# Patient Record
Sex: FEMALE | Race: WHITE | NOT HISPANIC OR LATINO | Employment: PART TIME | URBAN - METROPOLITAN AREA
[De-identification: names, ages, dates, MRNs, and addresses within clinical notes are randomized per-mention and may not be internally consistent; named-entity substitution may affect disease eponyms.]

---

## 2018-04-10 ENCOUNTER — HOSPITAL ENCOUNTER (INPATIENT)
Facility: MEDICAL CENTER | Age: 53
LOS: 38 days | DRG: 917 | End: 2018-05-18
Attending: EMERGENCY MEDICINE | Admitting: INTERNAL MEDICINE
Payer: COMMERCIAL

## 2018-04-10 ENCOUNTER — HOSPITAL ENCOUNTER (OUTPATIENT)
Dept: RADIOLOGY | Facility: MEDICAL CENTER | Age: 53
End: 2018-04-10

## 2018-04-10 ENCOUNTER — RESOLUTE PROFESSIONAL BILLING HOSPITAL PROF FEE (OUTPATIENT)
Dept: HOSPITALIST | Facility: MEDICAL CENTER | Age: 53
End: 2018-04-10
Payer: COMMERCIAL

## 2018-04-10 DIAGNOSIS — Q21.12 PFO (PATENT FORAMEN OVALE): ICD-10-CM

## 2018-04-10 DIAGNOSIS — I63.9 CEREBROVASCULAR ACCIDENT (CVA), UNSPECIFIED MECHANISM (HCC): ICD-10-CM

## 2018-04-10 DIAGNOSIS — R79.89 ELEVATED BRAIN NATRIURETIC PEPTIDE (BNP) LEVEL: ICD-10-CM

## 2018-04-10 DIAGNOSIS — F11.20 NARCOTIC DEPENDENCE (HCC): ICD-10-CM

## 2018-04-10 DIAGNOSIS — R79.89 ELEVATED TROPONIN: ICD-10-CM

## 2018-04-10 DIAGNOSIS — G40.909 SEIZURE DISORDER AS SEQUELA OF CEREBROVASCULAR ACCIDENT (HCC): ICD-10-CM

## 2018-04-10 DIAGNOSIS — D72.829 LEUKOCYTOSIS, UNSPECIFIED TYPE: ICD-10-CM

## 2018-04-10 DIAGNOSIS — I69.398 SEIZURE DISORDER AS SEQUELA OF CEREBROVASCULAR ACCIDENT (HCC): ICD-10-CM

## 2018-04-10 DIAGNOSIS — R41.82 ALTERED MENTAL STATUS, UNSPECIFIED ALTERED MENTAL STATUS TYPE: ICD-10-CM

## 2018-04-10 DIAGNOSIS — T40.604A NARCOTIC OVERDOSE, UNDETERMINED INTENT, INITIAL ENCOUNTER (HCC): ICD-10-CM

## 2018-04-10 PROBLEM — E87.20 LACTIC ACIDOSIS: Status: ACTIVE | Noted: 2018-04-10

## 2018-04-10 PROBLEM — N17.9 ACUTE KIDNEY INJURY (HCC): Status: ACTIVE | Noted: 2018-04-10

## 2018-04-10 PROBLEM — I95.9 HYPOTENSION: Status: ACTIVE | Noted: 2018-04-10

## 2018-04-10 PROBLEM — T50.901A ACCIDENTAL DRUG OVERDOSE: Status: ACTIVE | Noted: 2018-04-10

## 2018-04-10 PROBLEM — F99 PSYCHIATRIC DISORDER: Status: ACTIVE | Noted: 2018-04-10

## 2018-04-10 PROBLEM — R73.9 HYPERGLYCEMIA: Status: ACTIVE | Noted: 2018-04-10

## 2018-04-10 PROBLEM — E87.1 HYPONATREMIA: Status: ACTIVE | Noted: 2018-04-10

## 2018-04-10 LAB
CK SERPL-CCNC: 126 U/L (ref 0–154)
EST. AVERAGE GLUCOSE BLD GHB EST-MCNC: 108 MG/DL
HBA1C MFR BLD: 5.4 % (ref 0–5.6)
LACTATE BLD-SCNC: 1.6 MMOL/L (ref 0.5–2)
LV EJECT FRACT  99904: 70
LV EJECT FRACT MOD 2C 99903: 67.55
LV EJECT FRACT MOD 4C 99902: 78.09
LV EJECT FRACT MOD BP 99901: 73.64
TROPONIN I SERPL-MCNC: 0.25 NG/ML (ref 0–0.04)

## 2018-04-10 PROCEDURE — 700111 HCHG RX REV CODE 636 W/ 250 OVERRIDE (IP): Performed by: INTERNAL MEDICINE

## 2018-04-10 PROCEDURE — 99292 CRITICAL CARE ADDL 30 MIN: CPT | Performed by: HOSPITALIST

## 2018-04-10 PROCEDURE — 303105 HCHG CATHETER EXTRA

## 2018-04-10 PROCEDURE — 82550 ASSAY OF CK (CPK): CPT | Mod: 91

## 2018-04-10 PROCEDURE — 93306 TTE W/DOPPLER COMPLETE: CPT

## 2018-04-10 PROCEDURE — 99291 CRITICAL CARE FIRST HOUR: CPT

## 2018-04-10 PROCEDURE — 700102 HCHG RX REV CODE 250 W/ 637 OVERRIDE(OP): Performed by: HOSPITALIST

## 2018-04-10 PROCEDURE — 99291 CRITICAL CARE FIRST HOUR: CPT | Performed by: INTERNAL MEDICINE

## 2018-04-10 PROCEDURE — 93306 TTE W/DOPPLER COMPLETE: CPT | Mod: 26 | Performed by: INTERNAL MEDICINE

## 2018-04-10 PROCEDURE — 51702 INSERT TEMP BLADDER CATH: CPT

## 2018-04-10 PROCEDURE — 700105 HCHG RX REV CODE 258: Performed by: INTERNAL MEDICINE

## 2018-04-10 PROCEDURE — 94760 N-INVAS EAR/PLS OXIMETRY 1: CPT

## 2018-04-10 PROCEDURE — 95951 EEG: CPT | Mod: 52

## 2018-04-10 PROCEDURE — A9270 NON-COVERED ITEM OR SERVICE: HCPCS | Performed by: HOSPITALIST

## 2018-04-10 PROCEDURE — 700111 HCHG RX REV CODE 636 W/ 250 OVERRIDE (IP): Performed by: HOSPITALIST

## 2018-04-10 PROCEDURE — 83036 HEMOGLOBIN GLYCOSYLATED A1C: CPT

## 2018-04-10 PROCEDURE — 770022 HCHG ROOM/CARE - ICU (200)

## 2018-04-10 PROCEDURE — 700105 HCHG RX REV CODE 258: Performed by: HOSPITALIST

## 2018-04-10 PROCEDURE — 84484 ASSAY OF TROPONIN QUANT: CPT | Mod: 91

## 2018-04-10 PROCEDURE — 83605 ASSAY OF LACTIC ACID: CPT | Mod: 91

## 2018-04-10 PROCEDURE — 700111 HCHG RX REV CODE 636 W/ 250 OVERRIDE (IP)

## 2018-04-10 RX ORDER — HYDROCORTISONE 5 MG/1
5 TABLET ORAL DAILY
Status: DISCONTINUED | OUTPATIENT
Start: 2018-04-10 | End: 2018-04-22

## 2018-04-10 RX ORDER — ACETAMINOPHEN 325 MG/1
650 TABLET ORAL EVERY 6 HOURS PRN
Status: DISCONTINUED | OUTPATIENT
Start: 2018-04-10 | End: 2018-04-26

## 2018-04-10 RX ORDER — POLYETHYLENE GLYCOL 3350 17 G/17G
1 POWDER, FOR SOLUTION ORAL
Status: DISCONTINUED | OUTPATIENT
Start: 2018-04-10 | End: 2018-04-13

## 2018-04-10 RX ORDER — FLUDROCORTISONE ACETATE 0.1 MG/1
0.1 TABLET ORAL EVERY MORNING
Status: DISCONTINUED | OUTPATIENT
Start: 2018-04-10 | End: 2018-04-10

## 2018-04-10 RX ORDER — BISACODYL 10 MG
10 SUPPOSITORY, RECTAL RECTAL
Status: DISCONTINUED | OUTPATIENT
Start: 2018-04-10 | End: 2018-04-13

## 2018-04-10 RX ORDER — PANTOPRAZOLE SODIUM 20 MG/1
20 TABLET, DELAYED RELEASE ORAL
Status: DISCONTINUED | OUTPATIENT
Start: 2018-04-10 | End: 2018-04-10

## 2018-04-10 RX ORDER — NALOXONE HYDROCHLORIDE 1 MG/ML
INJECTION INTRAMUSCULAR; INTRAVENOUS; SUBCUTANEOUS
Status: DISPENSED
Start: 2018-04-10 | End: 2018-04-10

## 2018-04-10 RX ORDER — NALOXONE HYDROCHLORIDE 0.4 MG/ML
INJECTION, SOLUTION INTRAMUSCULAR; INTRAVENOUS; SUBCUTANEOUS
Status: COMPLETED
Start: 2018-04-10 | End: 2018-04-10

## 2018-04-10 RX ORDER — OMEPRAZOLE 20 MG/1
20 CAPSULE, DELAYED RELEASE ORAL DAILY
Status: DISCONTINUED | OUTPATIENT
Start: 2018-04-10 | End: 2018-04-11

## 2018-04-10 RX ORDER — AMOXICILLIN 250 MG
2 CAPSULE ORAL 2 TIMES DAILY
Status: DISCONTINUED | OUTPATIENT
Start: 2018-04-10 | End: 2018-04-13

## 2018-04-10 RX ORDER — HYDROCORTISONE 10 MG/1
10 TABLET ORAL DAILY
Status: DISCONTINUED | OUTPATIENT
Start: 2018-04-10 | End: 2018-04-10

## 2018-04-10 RX ORDER — LEVETIRACETAM 100 MG/ML
500 SOLUTION ORAL EVERY 12 HOURS
Status: DISCONTINUED | OUTPATIENT
Start: 2018-04-10 | End: 2018-04-14

## 2018-04-10 RX ORDER — HEPARIN SODIUM 5000 [USP'U]/ML
5000 INJECTION, SOLUTION INTRAVENOUS; SUBCUTANEOUS EVERY 8 HOURS
Status: DISCONTINUED | OUTPATIENT
Start: 2018-04-10 | End: 2018-04-26

## 2018-04-10 RX ORDER — LABETALOL HYDROCHLORIDE 5 MG/ML
5 INJECTION, SOLUTION INTRAVENOUS EVERY 4 HOURS PRN
Status: DISCONTINUED | OUTPATIENT
Start: 2018-04-10 | End: 2018-05-10

## 2018-04-10 RX ORDER — SODIUM CHLORIDE 9 MG/ML
INJECTION, SOLUTION INTRAVENOUS CONTINUOUS
Status: DISCONTINUED | OUTPATIENT
Start: 2018-04-10 | End: 2018-04-17

## 2018-04-10 RX ORDER — FLUDROCORTISONE ACETATE 0.1 MG/1
0.05 TABLET ORAL EVERY MORNING
Status: DISCONTINUED | OUTPATIENT
Start: 2018-04-10 | End: 2018-04-12

## 2018-04-10 RX ADMIN — AMPICILLIN SODIUM AND SULBACTAM SODIUM 3 G: 2; 1 INJECTION, POWDER, FOR SOLUTION INTRAMUSCULAR; INTRAVENOUS at 18:07

## 2018-04-10 RX ADMIN — LEVETIRACETAM 500 MG: 100 SOLUTION ORAL at 21:13

## 2018-04-10 RX ADMIN — HEPARIN SODIUM 5000 UNITS: 5000 INJECTION, SOLUTION INTRAVENOUS; SUBCUTANEOUS at 23:04

## 2018-04-10 RX ADMIN — HEPARIN SODIUM 5000 UNITS: 5000 INJECTION, SOLUTION INTRAVENOUS; SUBCUTANEOUS at 16:18

## 2018-04-10 RX ADMIN — SODIUM CHLORIDE: 900 INJECTION INTRAVENOUS at 23:04

## 2018-04-10 RX ADMIN — NALOXONE HYDROCHLORIDE: 0.4 INJECTION, SOLUTION INTRAMUSCULAR; INTRAVENOUS; SUBCUTANEOUS at 06:50

## 2018-04-10 RX ADMIN — SODIUM CHLORIDE: 900 INJECTION INTRAVENOUS at 09:18

## 2018-04-10 RX ADMIN — HEPARIN SODIUM 5000 UNITS: 5000 INJECTION, SOLUTION INTRAVENOUS; SUBCUTANEOUS at 09:18

## 2018-04-10 RX ADMIN — AMPICILLIN SODIUM AND SULBACTAM SODIUM 3 G: 2; 1 INJECTION, POWDER, FOR SOLUTION INTRAMUSCULAR; INTRAVENOUS at 11:46

## 2018-04-10 RX ADMIN — SODIUM CHLORIDE 1000 MG: 9 INJECTION, SOLUTION INTRAVENOUS at 16:18

## 2018-04-10 ASSESSMENT — ENCOUNTER SYMPTOMS
HEARTBURN: 0
CHILLS: 0
NECK PAIN: 0
HEADACHES: 0
DIZZINESS: 1
NERVOUS/ANXIOUS: 1
EYE PAIN: 0
PALPITATIONS: 0
CONSTIPATION: 0
TREMORS: 1
DIARRHEA: 0
WHEEZING: 0
FALLS: 0
FEVER: 0
SHORTNESS OF BREATH: 0
SPUTUM PRODUCTION: 0
SPEECH CHANGE: 0
ABDOMINAL PAIN: 0
TINGLING: 1
COUGH: 0
VOMITING: 0
BLURRED VISION: 0
WEIGHT LOSS: 0
WEAKNESS: 0
BACK PAIN: 0
PND: 0
NAUSEA: 0
LOSS OF CONSCIOUSNESS: 1
SEIZURES: 0
DEPRESSION: 0

## 2018-04-10 ASSESSMENT — PAIN SCALES - GENERAL
PAINLEVEL_OUTOF10: 0
PAINLEVEL_OUTOF10: 0

## 2018-04-10 ASSESSMENT — LIFESTYLE VARIABLES: SUBSTANCE_ABUSE: 0

## 2018-04-10 NOTE — PROGRESS NOTES
Pt admitted earlier this am by my partner for altered mental status, opiate overdose.  Transferred from outlying facility after being found down with multiple opiate  (fentanyl and buprenorphine).  Pt responded to narcan.  On my exam alert and agitated, is following some commands, purposeful.  Protecting her airway.

## 2018-04-10 NOTE — EEG PROGRESS NOTE
EEG 04/10/18 2:40 PM    ROUTINE ELECTROENCEPHALOGRAM REPORT      NAME: Shasta Dunn    REFERRING Dr:    INDICATION: altered mental status      TECHNIQUE: 30 channel routine electroencephalogram (EEG) was performed in accordance with the international 10-20 system. The study was reviewed in bipolar and referential montages. The recording examined the patient during wakeful and drowsy state(s).     DESCRIPTION OF THE RECORD:      Background rhythm during awake stage shows average voltage 8 to 9 hertz alpha activity in the posterior regions.  It blocks with eye opening and it is bilaterally synchronous and symmetrical.  No definite epileptiform spike-and-wave discharges but there are persistent 1-2 Hz delta lateralizing abnormalities over the left Stage II sleep was achieved.      ACTIVATION PROCEDURES:          ICTAL AND/OR INTERICTAL FINDINGS:        EKG: sampling of the EKG recording demonstrated sinus rhythm.        INTERPRETATION:        ________________________________________________________________________    This scalp EEG is consistent with      Focal cortical dysfunction and focal irritability over the left     Probable focal seizures from the left     Advise anti-seizure medication    There are no definite electrographic seizures in this record though      EEG 04/10/18 2:49 PM    ________________________________________________________________________

## 2018-04-10 NOTE — DISCHARGE PLANNING
Medical SW    Sw attended AM IDT Rounds.    Md reports, pt admitted for altered mental status, and found down at home in [facility]. Pt noted to have opiate patches on her body. May eventually become a legal hold.     Plan: Sw to assist w/ d/c planning as needed.

## 2018-04-10 NOTE — ASSESSMENT & PLAN NOTE
BNP 8000s on arrival.   TTE showed normal diastolic function & EF 70%.  Remains euvolemic on exam

## 2018-04-10 NOTE — ASSESSMENT & PLAN NOTE
No CP or hx of CAD.  Normal echocardiogram.  Repeat trended down.   No need for further studies at this time, consider stress test as outpatient when other issues stable

## 2018-04-10 NOTE — ASSESSMENT & PLAN NOTE
Buprenorphine patch removed PTA.   Patient was on high doses of narcotics at home for chronic pain and fibromyalgia  Continue conservative use of narcotics, tapering down as tolerated

## 2018-04-10 NOTE — ED PROVIDER NOTES
ED Provider Note    Scribed for Santo Oliveira M.D. by Karl Harvey. 4/10/2018  6:49 AM    Primary care provider: None noted  Means of arrival: EMS  History obtained from: Transfer facility note  History limited by: Altered mental status    CHIEF COMPLAINT  Chief Complaint   Patient presents with   • ALOC       HPI  Shsata Dunn is a 52 y.o. female with a history of opioid use who presents to the Emergency Department as a transfer from [facility] for evaluation of altered mental status and apparent opioid overdose. Per transfer facility note, patient was found in bed with altered mental status by her significant other. She was incontinent of stool and urine. Transfer facility note states patient was given 2 mg IV narcan at the door of that ED. She had improvement and began to wake up and breathe more spontaneously. Patient was sent here for further evaluation and care.    History is limited secondary altered mental status.      REVIEW OF SYSTEMS  Pertinent positives include altered mental status, urine and stool incontinence (per transfer facility note).     ROS is limited secondary to altered mental status.  C     PAST MEDICAL HISTORY   has a past medical history of Hypotension and Psychiatric disorder.    SURGICAL HISTORY  patient denies any surgical history    SOCIAL HISTORY  Social History   Substance Use Topics   • Smokeless tobacco: Never Used   • Alcohol use No      History   Drug Use No       FAMILY HISTORY  History reviewed. No pertinent family history.    CURRENT MEDICATIONS  No current facility-administered medications on file prior to encounter.      Current Outpatient Prescriptions on File Prior to Encounter   Medication Sig Dispense Refill   • MORPHINE SULFATE PO Take  by mouth.     • Buprenorphine 20 MCG/HR PATCH WEEKLY Apply  to skin as directed.     • hydrocortisone (CORTEF) 10 MG Tab Take 10 mg by mouth every day.     • Pregabalin (LYRICA PO) Take  by mouth.     • estradiol (VIVELLE DOT)  "0.1 MG/24HR PATCH BIWEEKLY      • estradiol (VIVELLE DOT) 0.05 MG/24HR PATCH BIWEEKLY Apply 1 Patch to skin as directed.     • fludrocortisone (FLORINEF) 0.1 MG Tab Take 0.1 mg by mouth.     • HYDROmorphone (DILAUDID) 1 MG/ML Solution 1 mg.     • HYDROmorphone (DILAUDID) 4 MG Tab Take 4 mg by mouth.     • LORazepam (ATIVAN) 1 MG Tab 1 mg.     • Melatonin 5 MG Cap Take 5 mg by mouth.     • midodrine (PROAMATINE) 10 MG tablet Take 10 mg by mouth.     • ondansetron (ZOFRAN) 8 MG Tab Take 8 mg by mouth.     • ondansetron (ZOFRAN ODT) 4 MG TABLET DISPERSIBLE Take 8 mg by mouth.     • pantoprazole (PROTONIX) 20 MG tablet TAKE 1 TABLET(20 MG) BY MOUTH DAILY 30 TO 60 MINUTES BEFORE EATING( ON AN EMPTY STOMACH)     • zolpidem (AMBIEN) 5 MG Tab 5 mg.     • Buprenorphine (BUTRANS) 10 MCG/HR PATCH WEEKLY      • hydrocortisone (CORTEF) 10 MG Tab      • morphine ER (MS CONTIN) 60 MG Tab CR tablet Take 60 mg by mouth.     • morphine (MS IR) 30 MG tablet Take 60 mg by mouth.     • pregabalin (LYRICA) 300 MG capsule Take 300 mg by mouth.     • pregabalin (LYRICA) 50 MG capsule 300 mg.        ALLERGIES  Allergies   Allergen Reactions   • Sulfamethoxazole-Trimethoprim      Other reaction(s): *N/A*  Muscle cramps       PHYSICAL EXAM  VITAL SIGNS: /79   Pulse (!) 110   Temp 36.7 °C (98 °F)   Resp 14   Ht 1.676 m (5' 6\")   Wt 58.1 kg (128 lb)   BMI 20.66 kg/m²   Nursing note and vitals reviewed.  Constitutional: Well-developed and well-nourished. Agitated, writhing in bed after narcan.  HENT: Head is normocephalic and atraumatic. Oropharynx is clear without exudate or erythema. Moist mucus membranes, dry lips.  Eyes: Continues to have pinpoint pupils. Conjunctiva are normal.   Cardiovascular: Tachycardic and regular rhythm. No murmur heard. Normal radial pulses.  Pulmonary/Chest: Breath sounds normal. No wheezes or rales.   Abdominal: Soft and non-tender. No distention    Musculoskeletal: Extremities exhibit normal range of " motion without edema or tenderness.   Neurological: Moving all extremities. Somnolent. Does not answer questions.  Skin: Skin is warm and dry. No rash.   Psychiatric: Unable to assess.      DIAGNOSTIC STUDIES / PROCEDURES    LABS  Results for orders placed or performed during the hospital encounter of 04/10/18   CREATINE KINASE   Result Value Ref Range    CPK Total 126 0 - 154 U/L   LACTIC ACID   Result Value Ref Range    Lactic Acid 1.6 0.5 - 2.0 mmol/L      All labs reviewed by me.    RADIOLOGY  OUTSIDE IMAGES-DX CHEST   Final Result      OUTSIDE IMAGES-CT HEAD   Final Result      ECHOCARDIOGRAM COMP W/O CONT    (Results Pending)     The radiologist's interpretation of all radiological studies have been reviewed by me.    COURSE & MEDICAL DECISION MAKING  Nursing notes, VS, PMSFHx reviewed in chart.     Review of outside medical records. Evaluation at outside hospital remarkable for elevated white blood cell count, elevated creatinine, elevated troponin. BNP of 8030.    6:49 AM - Patient seen and examined at bedside. Patient presents today for as an apparent opoid overdose. Patient will be treated with naloxone HCL 2 mg/2ml inj, naloxone HCL 0.4 mg/ml inj. Ordered creatine kinase to evaluate her symptoms.     7:08 AM - I discussed the patient's case and the above findings with Dr. Parker (hospitalist) who will admit the patient.      I reviewed the records from the transferring physician and hospital. The patient continues to have altered mental status, I am concerned she may have suffered a hypoxic event. She will be admitted to the hospital for further evaluation and treatment.    CRITICAL CARE  I provided critical care services, which included medication orders, frequent reevaluations of the patient's condition and response to treatment, ordering and reviewing test results, and discussing the case with various consultants.  The critical care time associated with the care of the patient was 35 minutes. Review  chart for interventions. This time is exclusive of any other billable procedures.       DISPOSITION:  Patient will be admitted to Dr. Parker in guarded condition.     FINAL IMPRESSION  1. Altered mental status, unspecified altered mental status type    2. Elevated troponin    3. Elevated brain natriuretic peptide (BNP) level    4. Leukocytosis, unspecified type    5. Narcotic dependence (CMS-[facility])    6. Narcotic overdose, undetermined intent, initial encounter       Critical care time of 35 minutes, as outlined above.      Karl MORTENSEN (Scribe), am scribing for, and in the presence of, Santo Oliveira M.D..    Electronically signed by: Karl Harvey (Scribe), 4/10/2018    ISanto M.D. personally performed the services described in this documentation, as scribed by Karl Harvey in my presence, and it is both accurate and complete.    The note accurately reflects work and decisions made by me.  Santo Oliveira  4/10/2018  11:45 AM

## 2018-04-10 NOTE — PROGRESS NOTES
[facility] Hospitalist Progress Note    Date of Service: 4/10/2018    Chief Complaint  52 y.o. female admitted 4/10/2018 after being found down [facility].  Seen initially at [facility] and transfered to us.  Initially thought to be opiate OD; pt on buprenorphine patch, MSO4 IR, lyrica, dilaudid as well as ambien.  Did respond to Narcan.    Interval Problem Update  ROS unobtainable    Consultants/Specialty  Neurology    Disposition    Critical care time 45min's: status epilepticus, coordinating care and work up starting AED    Dw pt's sister by phone    Attempting to get records from [employer]; have called and FAXd multiple times.        Review of Systems   Unable to perform ROS: Mental status change      Physical Exam  Laboratory/Imaging   Hemodynamics  Temp (24hrs), Av.8 °C (100.1 °F), Min:36.7 °C (98 °F), Max:38.6 °C (101.5 °F)   Temperature: (!) 38.2 °C (100.8 °F)  Pulse  Av.4  Min: 77  Max: 110 Heart Rate (Monitored): 86  Blood Pressure: 140/79, NIBP: 129/82      Respiratory      Respiration: 18, Pulse Oximetry: 95 %        RUL Breath Sounds: Clear, RML Breath Sounds: Clear, RLL Breath Sounds: Clear, JOLYNN Breath Sounds: Clear, LLL Breath Sounds: Clear    Fluids  No intake or output data in the 24 hours ending 04/10/18 1537    Nutrition  Orders Placed This Encounter   Procedures   • Diet NPO     Standing Status:   Standing     Number of Occurrences:   1     Order Specific Question:   Restrict to:     Answer:   Sips with Medications [3]     Physical Exam   Constitutional: She appears well-developed and well-nourished. No distress.   HENT:   Head: Normocephalic and atraumatic.   Neck: No JVD present.   Cardiovascular: Normal rate and regular rhythm.    Pulmonary/Chest: Effort normal. No stridor. No respiratory distress. She has no wheezes. She has no rales.   Abdominal: Soft. There is no tenderness. There is no rebound and no guarding.   Musculoskeletal: She exhibits no edema.   Neurological: She is alert.   Agitated.   Nods and follows appropriately but is non verbal.  Motor non focal   Skin: Skin is warm and dry. No rash noted. She is not diaphoretic.   Nursing note and vitals reviewed.      Recent Labs      04/10/18   0330   WBC  16.0*   RBC  4.31   HEMOGLOBIN  13.0   HEMATOCRIT  39.4   MCV  91.4   MCH  30.2   MCHC  33.0   RDW  14.2   PLATELETCT  174   MPV  11.0*     Recent Labs      04/10/18   0330   SODIUM  134*   POTASSIUM  4.7   CHLORIDE  103   CO2  23   GLUCOSE  130*   BUN  26*   CREATININE  1.3*   CALCIUM  7.9*         Recent Labs      04/10/18   0330   BNPBTYPENAT  8030              Assessment/Plan     * Mental status change- (present on admission)   Assessment & Plan    Apparently patient was on heavy doses of narcotics at home for chronic pain  Patient was noticed to have buprenorphine patch, currently removed  Patient's altered mental status responsed to Narcan  CT unknown trauma  EEG ordered and + focal Sz  Neurology consulted  Load Keppra 1g f/b 500mg BID  MRI with anesthesia for sedation   Mentation has improved but still not normal        Hyponatremia- (present on admission)   Assessment & Plan    ivf  Na 130  monitor        Hyperglycemia- (present on admission)   Assessment & Plan    No hx of DM  Monitor and check a1c        Leukocytosis- (present on admission)   Assessment & Plan    Wbc 16  Likely stress vs infection  Monitor  No fever        Elevated brain natriuretic peptide (BNP) level- (present on admission)   Assessment & Plan    BNP 8000s  Check echo  Patient currently have no signs of peripheral edema        Elevated troponin- (present on admission)   Assessment & Plan    Trop 0.21  No hx of CAD  likley from OD  Recheck trop  Echo ordered        Lactic acidosis- (present on admission)   Assessment & Plan    fro OD  LA 2.3->1.6  Cont ivf        Acute kidney injury (CMS-HCC)- (present on admission)   Assessment & Plan    Cr 1.3  ivf  monitor        Psychiatric disorder- (present on admission)   Assessment &  Plan    Need record  monitor        Hypotension- (present on admission)   Assessment & Plan    Hx of   Currently normal  Patient was on steroid at home we will start half dose of them and needs confirmed with patient outpatient medication  ivf          Quality-Core Measures   Reviewed items::  Labs reviewed, Medications reviewed and Radiology images reviewed  DVT prophylaxis pharmacological::  Heparin  DVT prophylaxis - mechanical:  SCDs  Ulcer Prophylaxis::  Not indicated  Antibiotics:  Treating active infection/contamination beyond 24 hours perioperative coverage

## 2018-04-10 NOTE — ED NOTES
Pt transfer in from [facility].  Found own in bed, unresponsive, gurgling salvia. With bf here on vacation. Verbal order obtained from erp to give narcan iv push, tolerated well with good effect.  Pt able to states name, following commands and tracking.

## 2018-04-10 NOTE — PROGRESS NOTES
Ms. Dunn was here with a confirmed overdose of T40.2 - Other opioids; she has no other known overdoses.

## 2018-04-10 NOTE — ED NOTES
Pt moved to red 8 for better nursing view.  Constantly moving around gurney, won't follow commands to stay still.  Will continue to monitor.

## 2018-04-10 NOTE — CARE PLAN
Problem: Communication  Goal: The ability to communicate needs accurately and effectively will improve  Outcome: PROGRESSING SLOWER THAN EXPECTED  Nonverbal on admission    Problem: Safety  Goal: Will remain free from injury  Outcome: PROGRESSING SLOWER THAN EXPECTED  Nonverbal on admission, restless, sitter at bedside

## 2018-04-10 NOTE — PROCEDURES
DATE OF SERVICE:  04/10/2018    This is an inpatient EEG that was done on 04/10/2018.    ROUTINE ELECTROENCEPHALOGRAM REPORT        NAME: Shasta Dunn     REFERRING Dr:     INDICATION: altered mental status        TECHNIQUE: 30 channel routine electroencephalogram (EEG) was performed in accordance with the international 10-20 system. The study was reviewed in bipolar and referential montages. The recording examined the patient during wakeful and drowsy state(s).      DESCRIPTION OF THE RECORD:        Background rhythm during awake stage shows average voltage 8 to 9 hertz alpha activity in the posterior regions.  It blocks with eye opening and it is bilaterally synchronous and symmetrical.  No definite epileptiform spike-and-wave discharges but there are persistent 1-2 Hz delta lateralizing abnormalities over the left Stage II sleep was achieved.        ACTIVATION PROCEDURES:             ICTAL AND/OR INTERICTAL FINDINGS:          EKG: sampling of the EKG recording demonstrated sinus rhythm.          INTERPRETATION:           ________________________________________________________________________     This scalp EEG is consistent with                  Focal cortical dysfunction and focal irritability over the left                 Probable focal seizures from the left      Advise anti-seizure medication     There are no definite electrographic seizures in this record though        EEG 04/10/18 2:49 PM     ________________________________________________________________________                          ____________________________________     MD JUDD CASTRO / SKYLER    DD:  04/10/2018 14:52:56  DT:  04/10/2018 15:04:26    D#:  [id number]  Job#:  [id number]

## 2018-04-10 NOTE — ASSESSMENT & PLAN NOTE
Fluctuating  Completed Unasyn and Azithro 4/16.  Procalcitonin was normal.  Clinically stable  Likely secondary to steroids.  Continue to follow

## 2018-04-10 NOTE — PROGRESS NOTES
Admitted to CICU 602 via cart from ED. Nonverbal, moans and thrashes about in bed. Able to redirect briefly but then returns to thrashing.

## 2018-04-10 NOTE — ASSESSMENT & PLAN NOTE
Attempting to obtain records.  Will try to find out details from significant other.  Psychiatry consult placed.

## 2018-04-10 NOTE — H&P
Hospital Medicine History and Physical    Date of Service  4/10/2018    Chief Complaint  Chief Complaint   Patient presents with   • ALOC       History of Presenting Illness  52 y.o. female with a past medical history of hypotension, psych disorder, presented 4/10/2018 with complaint of altered mental status. Apparently patient was transferred from other facility. Patient was noticed to the unresponsive.  The patient was given 2 mg IV narcan at the door of the emergency department, when she began to wake up and breathe more spontaneously. EMS removed an estradiol patch and a buprenorphine patch from the patient's skin en route.   patient also was noticed in bed (sig other called EMS) incontinent of stool and urine.   patient is visiting from [place], [place].   from the record patient has been taking a lot of narcotics. Patient also was treated with steroid for hypotension. Upon arrival patient is still very somnolent. Another dose of Narcan was given. Patient was more awake. However patient is very confused and disoriented. Patient also complains of pain. Patient's pain is local, 6-8/10, intermittent and does not radiate to other location, sharp and with some tingling. Can be controlled by pain meds. In the ER patient's lab test was noticed to be overdosed with narcotics. Patient's other urine toxicology result negative. Patient was also noticed to have elevated troponin and mild pulmonary edema. Patient's BNP is significantly elevated. However patient currently denies chest pain. Patient also has no significant respiratory distress at this moment. Patient otherwise denies fever, chills, nausea, vomiting, adb pain, SOB, CP, headache, constipation, diarrhea, cough, or sputum.      Primary Care Physician  Pcp Not In Computer    Consultants  none    Code Status  Code: Full code    Review of Systems  Review of Systems   Constitutional: Negative for chills, fever and weight loss.   HENT: Negative for congestion, ear  discharge, ear pain, hearing loss and nosebleeds.    Eyes: Negative for blurred vision and pain.   Respiratory: Negative for cough, sputum production, shortness of breath and wheezing.    Cardiovascular: Negative for chest pain, palpitations, leg swelling and PND.   Gastrointestinal: Negative for abdominal pain, constipation, diarrhea, heartburn, nausea and vomiting.   Genitourinary: Negative for dysuria, frequency and hematuria.   Musculoskeletal: Negative for back pain, falls, joint pain and neck pain.   Skin: Negative for rash.   Neurological: Positive for dizziness, tingling, tremors and loss of consciousness. Negative for speech change, seizures, weakness and headaches.   Psychiatric/Behavioral: Negative for depression, substance abuse and suicidal ideas. The patient is nervous/anxious.       Past Medical History  Past Medical History:   Diagnosis Date   • Hypotension    • Psychiatric disorder        Surgical History  Patient denies any significant past surgical history    Medications  No current facility-administered medications on file prior to encounter.      Current Outpatient Prescriptions on File Prior to Encounter   Medication Sig Dispense Refill   • MORPHINE SULFATE PO Take  by mouth.     • Buprenorphine 20 MCG/HR PATCH WEEKLY Apply  to skin as directed.     • hydrocortisone (CORTEF) 10 MG Tab Take 10 mg by mouth every day.     • Pregabalin (LYRICA PO) Take  by mouth.     • estradiol (VIVELLE DOT) 0.1 MG/24HR PATCH BIWEEKLY      • estradiol (VIVELLE DOT) 0.05 MG/24HR PATCH BIWEEKLY Apply 1 Patch to skin as directed.     • fludrocortisone (FLORINEF) 0.1 MG Tab Take 0.1 mg by mouth.     • HYDROmorphone (DILAUDID) 1 MG/ML Solution 1 mg.     • HYDROmorphone (DILAUDID) 4 MG Tab Take 4 mg by mouth.     • LORazepam (ATIVAN) 1 MG Tab 1 mg.     • Melatonin 5 MG Cap Take 5 mg by mouth.     • midodrine (PROAMATINE) 10 MG tablet Take 10 mg by mouth.     • ondansetron (ZOFRAN) 8 MG Tab Take 8 mg by mouth.     •  "ondansetron (ZOFRAN ODT) 4 MG TABLET DISPERSIBLE Take 8 mg by mouth.     • pantoprazole (PROTONIX) 20 MG tablet TAKE 1 TABLET(20 MG) BY MOUTH DAILY 30 TO 60 MINUTES BEFORE EATING( ON AN EMPTY STOMACH)     • zolpidem (AMBIEN) 5 MG Tab 5 mg.     • Buprenorphine (BUTRANS) 10 MCG/HR PATCH WEEKLY      • hydrocortisone (CORTEF) 10 MG Tab      • morphine ER (MS CONTIN) 60 MG Tab CR tablet Take 60 mg by mouth.     • morphine (MS IR) 30 MG tablet Take 60 mg by mouth.     • pregabalin (LYRICA) 300 MG capsule Take 300 mg by mouth.     • pregabalin (LYRICA) 50 MG capsule 300 mg.         Family History  History reviewed. No pertinent family history.  reviewed and felt non pertinent to this encounter     Social History  Social History   Substance Use Topics   • Smoking status: Denies    • Smokeless tobacco: Never Used   • Alcohol use No       Allergies  Allergies   Allergen Reactions   • Sulfamethoxazole-Trimethoprim      Other reaction(s): *N/A*  Muscle cramps        Physical Exam  Laboratory   Vitals/ General Appearance:   Weight/BMI: Body mass index is 20.66 kg/m².  Blood pressure 140/79, pulse (!) 104, temperature 36.7 °C (98 °F), resp. rate (!) 24, height 1.676 m (5' 6\"), weight 58.1 kg (128 lb), SpO2 98 %.   Vitals:    04/10/18 0646 04/10/18 0650 04/10/18 0725 04/10/18 0730   BP:  140/79     Pulse:  (!) 110 99 (!) 104   Resp:  14 (!) 22 (!) 24   Temp:  36.7 °C (98 °F)     SpO2:   91% 98%   Weight: 58.1 kg (128 lb)      Height: 1.676 m (5' 6\")       Oxygen Therapy:  Pulse Oximetry: 98 %    Constitutional:  well developed, well nourished  HENMT: Normocephalic, atraumatic, b/l ears normal, nose normal  Eyes:  EOMI, conjunctiva normal, no discharge  Neck: no tracheal deviation, supple  Cardiovascular: tachycardic, normal rhythm, no murmurs, no rubs or gallops; no cyanosis, clubbing or edema  Lungs: Respiratory effort is normal, normal breath sounds, breath sounds clear to auscultation b/l, no rales, rhonchi or " wheezing  Abdomen: soft, non-tender, no guarding or rebound, active BS, no mass  Skin: warm, dry, no erythema, no rash  Neurologic: Alert and oriented, strength 5/5, no focal deficits, CN II-XII normal  Psychiatric: Some anxiety or depression  Lymph node: No lymphadenopathy appreciated in the neck groin and axillary area.   Extremities: Bilateral lower extremities no pitting edema, bilateral pulses symmetric          Assessment/Plan  * Accidental drug overdose- (present on admission)   Assessment & Plan    Apparently patient was on heavy doses of narcotics at home for chronic pain  Patient was noticed to have buprenorphine patch, currently removed  Patient's altered mental status responsed to Narcan  CT unknown trauma  Urine toxicology confirmed opiate overdose  Patient will be admitted to ICU for close monitoring  Neuro check every 4 hours  Hold off on any narcotics  IV fluid rehydration        Hyponatremia- (present on admission)   Assessment & Plan    ivf  Na 130  monitor        Hyperglycemia- (present on admission)   Assessment & Plan    No hx of DM  Monitor and check a1c        Leukocytosis- (present on admission)   Assessment & Plan    Wbc 16  Likely stress vs infection  Monitor  No fever        Elevated brain natriuretic peptide (BNP) level- (present on admission)   Assessment & Plan    BNP 8000s  Check echo  Patient currently have no signs of peripheral edema        Elevated troponin- (present on admission)   Assessment & Plan    Trop 0.21  No hx of CAD  likley from OD  Recheck trop  Echo ordered        Lactic acidosis- (present on admission)   Assessment & Plan    fro OD  LA 2.3->1.6  Cont ivf        Acute kidney injury (CMS-HCC)- (present on admission)   Assessment & Plan    Cr 1.3  ivf  monitor        Psychiatric disorder- (present on admission)   Assessment & Plan    Need record  monitor        Hypotension- (present on admission)   Assessment & Plan    Hx of   Currently normal  Patient was on steroid at  home we will start half dose of them and needs confirmed with patient outpatient medication  ivf            I anticipate this patient will require at least two midnights for appropriate medical management, necessitating inpatient admission.    Prophylaxis: sc heparin    Recent Labs      04/10/18   0330   WBC  16.0*   RBC  4.31   HEMOGLOBIN  13.0   HEMATOCRIT  39.4   MCV  91.4   MCH  30.2   MCHC  33.0   RDW  14.2   PLATELETCT  174   MPV  11.0*     Recent Labs      04/10/18   0330   SODIUM  134*   POTASSIUM  4.7   CHLORIDE  103   CO2  23   GLUCOSE  130*   BUN  26*   CREATININE  1.3*   CALCIUM  7.9*     Recent Labs      04/10/18   0330   ALTSGPT  71   ASTSGOT  45*   ALKPHOSPHAT  62   TBILIRUBIN  0.6   LIPASE  85   GLUCOSE  130*         Recent Labs      04/10/18   0330   BNPBTYPENAT  8030         Lab Results   Component Value Date    TROPONINI 0.21 () 04/10/2018       Imaging  DX-CHEST-PORTABLE (1 VIEW): pulmonary edema  CT-HEAD W/O: No acute intracranial findings. There is no evidence for acute intracranial hemorrhage, mass lesion, mass effect or midline shift, abnormal extra-axial fluid collection, acute large vessel territory ischemia, hydrocephalus or other acute intracranial findings. A small portion of the left temporal bone is not entirely included in the field of view. [facility].  EKG at 3:34 AM: Rate 96, normal sinus rhythm, normal axis and intervals, T-wave inversion in leads 3, V3, V4, V5. No acute ST segments.        I have discussed patient admission status with  in the ER.    I evaluated the patient, reviewing the chart, vitals, labs and imaging, discussing the case with ED physician, medication reconciliation, placing orders and enacting the plan above.    CRITICAL CARE    Patient currently is critically ill.    The very real possibilty of a deterioration of this patient's condition required the highest level of my preparedness for sudden, emergent intervention.  I provided  critical care services, which included medication orders, frequent reevaluations of the patient's condition and response to treatment, ordering and reviewing test results, and discussing the case with various consultants.  The critical care time associated with the care of the patient was 57 minutes. Review chart for interventions. This time is exclusive of any other billable procedures.     I have discussed with RN and other consultants about patient's plan.

## 2018-04-11 PROBLEM — J69.0 ASPIRATION PNEUMONIA (HCC): Status: ACTIVE | Noted: 2018-04-11

## 2018-04-11 PROBLEM — A09 DIARRHEA OF INFECTIOUS ORIGIN: Status: ACTIVE | Noted: 2018-04-11

## 2018-04-11 LAB
ALBUMIN SERPL BCP-MCNC: 3.5 G/DL (ref 3.2–4.9)
ALBUMIN/GLOB SERPL: 1.5 G/DL
ALP SERPL-CCNC: 39 U/L (ref 30–99)
ALT SERPL-CCNC: 37 U/L (ref 2–50)
ANION GAP SERPL CALC-SCNC: 10 MMOL/L (ref 0–11.9)
AST SERPL-CCNC: 37 U/L (ref 12–45)
BASOPHILS # BLD AUTO: 0.4 % (ref 0–1.8)
BASOPHILS # BLD: 0.05 K/UL (ref 0–0.12)
BILIRUB SERPL-MCNC: 0.8 MG/DL (ref 0.1–1.5)
BUN SERPL-MCNC: 19 MG/DL (ref 8–22)
CALCIUM SERPL-MCNC: 8 MG/DL (ref 8.5–10.5)
CHLORIDE SERPL-SCNC: 111 MMOL/L (ref 96–112)
CO2 SERPL-SCNC: 22 MMOL/L (ref 20–33)
CREAT SERPL-MCNC: 0.79 MG/DL (ref 0.5–1.4)
EOSINOPHIL # BLD AUTO: 0 K/UL (ref 0–0.51)
EOSINOPHIL NFR BLD: 0 % (ref 0–6.9)
ERYTHROCYTE [DISTWIDTH] IN BLOOD BY AUTOMATED COUNT: 45.1 FL (ref 35.9–50)
GLOBULIN SER CALC-MCNC: 2.4 G/DL (ref 1.9–3.5)
GLUCOSE SERPL-MCNC: 99 MG/DL (ref 65–99)
HCT VFR BLD AUTO: 33.6 % (ref 37–47)
HGB BLD-MCNC: 11.2 G/DL (ref 12–16)
IMM GRANULOCYTES # BLD AUTO: 0.05 K/UL (ref 0–0.11)
IMM GRANULOCYTES NFR BLD AUTO: 0.4 % (ref 0–0.9)
LYMPHOCYTES # BLD AUTO: 1.41 K/UL (ref 1–4.8)
LYMPHOCYTES NFR BLD: 10.4 % (ref 22–41)
MCH RBC QN AUTO: 30.1 PG (ref 27–33)
MCHC RBC AUTO-ENTMCNC: 33.3 G/DL (ref 33.6–35)
MCV RBC AUTO: 90.3 FL (ref 81.4–97.8)
MONOCYTES # BLD AUTO: 1.13 K/UL (ref 0–0.85)
MONOCYTES NFR BLD AUTO: 8.4 % (ref 0–13.4)
NEUTROPHILS # BLD AUTO: 10.89 K/UL (ref 2–7.15)
NEUTROPHILS NFR BLD: 80.4 % (ref 44–72)
NRBC # BLD AUTO: 0 K/UL
NRBC BLD-RTO: 0 /100 WBC
PLATELET # BLD AUTO: 132 K/UL (ref 164–446)
PMV BLD AUTO: 11 FL (ref 9–12.9)
POTASSIUM SERPL-SCNC: 3.8 MMOL/L (ref 3.6–5.5)
PROCALCITONIN SERPL-MCNC: <0.05 NG/ML
PROT SERPL-MCNC: 5.9 G/DL (ref 6–8.2)
RBC # BLD AUTO: 3.72 M/UL (ref 4.2–5.4)
SODIUM SERPL-SCNC: 143 MMOL/L (ref 135–145)
WBC # BLD AUTO: 13.5 K/UL (ref 4.8–10.8)

## 2018-04-11 PROCEDURE — 700111 HCHG RX REV CODE 636 W/ 250 OVERRIDE (IP): Performed by: HOSPITALIST

## 2018-04-11 PROCEDURE — A9270 NON-COVERED ITEM OR SERVICE: HCPCS | Performed by: HOSPITALIST

## 2018-04-11 PROCEDURE — 700111 HCHG RX REV CODE 636 W/ 250 OVERRIDE (IP): Performed by: INTERNAL MEDICINE

## 2018-04-11 PROCEDURE — 700105 HCHG RX REV CODE 258: Performed by: INTERNAL MEDICINE

## 2018-04-11 PROCEDURE — 99233 SBSQ HOSP IP/OBS HIGH 50: CPT | Performed by: HOSPITALIST

## 2018-04-11 PROCEDURE — 85025 COMPLETE CBC W/AUTO DIFF WBC: CPT

## 2018-04-11 PROCEDURE — 700105 HCHG RX REV CODE 258: Performed by: HOSPITALIST

## 2018-04-11 PROCEDURE — 700102 HCHG RX REV CODE 250 W/ 637 OVERRIDE(OP): Performed by: HOSPITALIST

## 2018-04-11 PROCEDURE — G8996 SWALLOW CURRENT STATUS: HCPCS | Mod: CK

## 2018-04-11 PROCEDURE — 770020 HCHG ROOM/CARE - TELE (206)

## 2018-04-11 PROCEDURE — G8997 SWALLOW GOAL STATUS: HCPCS | Mod: CJ

## 2018-04-11 PROCEDURE — 92610 EVALUATE SWALLOWING FUNCTION: CPT

## 2018-04-11 PROCEDURE — 700102 HCHG RX REV CODE 250 W/ 637 OVERRIDE(OP): Performed by: INTERNAL MEDICINE

## 2018-04-11 PROCEDURE — 770006 HCHG ROOM/CARE - MED/SURG/GYN SEMI*

## 2018-04-11 PROCEDURE — 80053 COMPREHEN METABOLIC PANEL: CPT

## 2018-04-11 PROCEDURE — 84145 PROCALCITONIN (PCT): CPT

## 2018-04-11 PROCEDURE — A9270 NON-COVERED ITEM OR SERVICE: HCPCS | Performed by: INTERNAL MEDICINE

## 2018-04-11 RX ORDER — MICONAZOLE NITRATE 20 MG/G
CREAM TOPICAL EVERY 6 HOURS
Status: DISCONTINUED | OUTPATIENT
Start: 2018-04-11 | End: 2018-05-10

## 2018-04-11 RX ORDER — HYDROMORPHONE HYDROCHLORIDE 2 MG/1
2 TABLET ORAL EVERY 4 HOURS PRN
Status: DISCONTINUED | OUTPATIENT
Start: 2018-04-11 | End: 2018-04-12

## 2018-04-11 RX ORDER — LORAZEPAM 2 MG/ML
0.5 INJECTION INTRAMUSCULAR EVERY 6 HOURS PRN
Status: DISCONTINUED | OUTPATIENT
Start: 2018-04-11 | End: 2018-04-26

## 2018-04-11 RX ORDER — LORAZEPAM 2 MG/ML
1 INJECTION INTRAMUSCULAR ONCE
Status: COMPLETED | OUTPATIENT
Start: 2018-04-11 | End: 2018-04-11

## 2018-04-11 RX ORDER — AZITHROMYCIN 250 MG/1
500 TABLET, FILM COATED ORAL DAILY
Status: DISPENSED | OUTPATIENT
Start: 2018-04-11 | End: 2018-04-16

## 2018-04-11 RX ADMIN — AMPICILLIN SODIUM AND SULBACTAM SODIUM 3 G: 2; 1 INJECTION, POWDER, FOR SOLUTION INTRAMUSCULAR; INTRAVENOUS at 06:17

## 2018-04-11 RX ADMIN — LORAZEPAM 0.5 MG: 2 INJECTION INTRAMUSCULAR; INTRAVENOUS at 06:17

## 2018-04-11 RX ADMIN — HEPARIN SODIUM 5000 UNITS: 5000 INJECTION, SOLUTION INTRAVENOUS; SUBCUTANEOUS at 21:01

## 2018-04-11 RX ADMIN — HYDROMORPHONE HYDROCHLORIDE 2 MG: 2 TABLET ORAL at 08:43

## 2018-04-11 RX ADMIN — SODIUM CHLORIDE: 900 INJECTION INTRAVENOUS at 11:49

## 2018-04-11 RX ADMIN — AMPICILLIN SODIUM AND SULBACTAM SODIUM 3 G: 2; 1 INJECTION, POWDER, FOR SOLUTION INTRAMUSCULAR; INTRAVENOUS at 00:48

## 2018-04-11 RX ADMIN — LEVETIRACETAM 500 MG: 100 SOLUTION ORAL at 21:02

## 2018-04-11 RX ADMIN — HEPARIN SODIUM 5000 UNITS: 5000 INJECTION, SOLUTION INTRAVENOUS; SUBCUTANEOUS at 14:00

## 2018-04-11 RX ADMIN — HEPARIN SODIUM 5000 UNITS: 5000 INJECTION, SOLUTION INTRAVENOUS; SUBCUTANEOUS at 06:17

## 2018-04-11 RX ADMIN — AMPICILLIN SODIUM AND SULBACTAM SODIUM 3 G: 2; 1 INJECTION, POWDER, FOR SOLUTION INTRAMUSCULAR; INTRAVENOUS at 11:50

## 2018-04-11 RX ADMIN — AMPICILLIN SODIUM AND SULBACTAM SODIUM 3 G: 2; 1 INJECTION, POWDER, FOR SOLUTION INTRAMUSCULAR; INTRAVENOUS at 17:25

## 2018-04-11 RX ADMIN — LORAZEPAM 1 MG: 2 INJECTION INTRAMUSCULAR; INTRAVENOUS at 01:25

## 2018-04-11 RX ADMIN — MICONAZOLE NITRATE: 20 CREAM TOPICAL at 16:52

## 2018-04-11 RX ADMIN — LORAZEPAM 0.5 MG: 2 INJECTION INTRAMUSCULAR; INTRAVENOUS at 21:08

## 2018-04-11 RX ADMIN — ACETAMINOPHEN 650 MG: 325 TABLET, FILM COATED ORAL at 21:02

## 2018-04-11 RX ADMIN — LEVETIRACETAM 500 MG: 100 SOLUTION ORAL at 08:47

## 2018-04-11 RX ADMIN — MICONAZOLE NITRATE: 20 CREAM TOPICAL at 20:00

## 2018-04-11 ASSESSMENT — LIFESTYLE VARIABLES: EVER_SMOKED: UNABLE TO EVALUATE AT THIS TIME - NEEDS ASSESSMENT PRIOR TO DISCHARGE

## 2018-04-11 ASSESSMENT — COPD QUESTIONNAIRES
COPD SCREENING SCORE: 1
HAVE YOU SMOKED AT LEAST 100 CIGARETTES IN YOUR ENTIRE LIFE: NO/DON'T KNOW
DO YOU EVER COUGH UP ANY MUCUS OR PHLEGM?: NO/ONLY WITH OCCASIONAL COLDS OR INFECTIONS
DURING THE PAST 4 WEEKS HOW MUCH DID YOU FEEL SHORT OF BREATH: NONE/LITTLE OF THE TIME

## 2018-04-11 ASSESSMENT — PAIN SCALES - GENERAL: PAINLEVEL_OUTOF10: 0

## 2018-04-11 NOTE — PROGRESS NOTES
[facility] Hospitalist Progress Note    Date of Service: 2018    Chief Complaint  52 y.o. female admitted 4/10/2018 after being found down chao SO.  Seen initially at [place] and transfered to us.  Initially thought to be opiate OD; pt on buprenorphine patch, MSO4 IR, lyrica, dilaudid as well as ambien.  Did respond to Narcan.    Interval Problem Update  ROS limited as pt is minimally verbal  Nods head yes to pain  Agitated overnight and given ativan 0.5mg x 1  Sinus linda to 40 overnight  Consultants/Specialty  Neurology    Disposition    Critical care time 45min's: status epilepticus, coordinating care and work up starting AED    Dw pt's sister by phone    Attempting to get records from [facility]; have called and FAXd multiple times.        Review of Systems   Unable to perform ROS: Other      Physical Exam  Laboratory/Imaging   Hemodynamics  Temp (24hrs), Av.7 °C (99.9 °F), Min:36.7 °C (98 °F), Max:38.6 °C (101.5 °F)   Temperature: 37.4 °C (99.3 °F)  Pulse  Av.1  Min: 52  Max: 110 Heart Rate (Monitored): (!) 52  Blood Pressure: 140/79, NIBP: 157/95      Respiratory      Respiration: 18, Pulse Oximetry: 99 %        RUL Breath Sounds: Clear, RML Breath Sounds: Clear, RLL Breath Sounds: Clear, JOLYNN Breath Sounds: Clear, LLL Breath Sounds: Clear    Fluids    Intake/Output Summary (Last 24 hours) at 18 0545  Last data filed at 04/10/18 2000   Gross per 24 hour   Intake             1047 ml   Output              550 ml   Net              497 ml       Nutrition  Orders Placed This Encounter   Procedures   • Diet NPO     Standing Status:   Standing     Number of Occurrences:   1     Order Specific Question:   Restrict to:     Answer:   Sips with Medications [3]     Physical Exam   Constitutional: She appears well-developed and well-nourished. No distress.   HENT:   Head: Normocephalic and atraumatic.   Neck: No JVD present.   Cardiovascular: Normal rate and regular rhythm.    Pulmonary/Chest: Effort normal. No  stridor. No respiratory distress. She has no wheezes. She has no rales.   Abdominal: Soft. There is no tenderness. There is no rebound and no guarding.   Musculoskeletal: She exhibits no edema.   Neurological: She is alert.   Agitated.    Answers yes and no appropriately, no other verbalizations on my exam   Skin: Skin is warm and dry. No rash noted. She is not diaphoretic.   Nursing note and vitals reviewed.      Recent Labs      04/10/18   0330   WBC  16.0*   RBC  4.31   HEMOGLOBIN  13.0   HEMATOCRIT  39.4   MCV  91.4   MCH  30.2   MCHC  33.0   RDW  14.2   PLATELETCT  174   MPV  11.0*     Recent Labs      04/10/18   0330   SODIUM  134*   POTASSIUM  4.7   CHLORIDE  103   CO2  23   GLUCOSE  130*   BUN  26*   CREATININE  1.3*   CALCIUM  7.9*         Recent Labs      04/10/18   0330   BNPBTYPENAT  8030              Assessment/Plan     * Mental status change- (present on admission)   Assessment & Plan    Apparently patient was on heavy doses of narcotics at home for chronic pain  Patient was noticed to have buprenorphine patch, currently removed  Patient's altered mental status responsed to Narcan  CT unknown trauma  EEG ordered and + focal Sz  Neurology consulted  Load Keppra 1g f/b 500mg BID  Holding on MRI pending Neuro recomendations  Pt is inconsistent with her speech        Aspiration pneumonia (CMS-HCC)   Assessment & Plan    Follow cultures  Unasyn x 5 days        Diarrhea of infectious origin   Assessment & Plan    Campylobacter on cultures from transferring facility  Start azithro x 5 days        Hyponatremia- (present on admission)   Assessment & Plan    ivf  Na 130  monitor        Hyperglycemia- (present on admission)   Assessment & Plan    No hx of DM  Monitor and check a1c        Leukocytosis- (present on admission)   Assessment & Plan    Wbc 16  Likely stress vs infection  Monitor  No fever        Elevated brain natriuretic peptide (BNP) level- (present on admission)   Assessment & Plan    BNP  8000s  Check echo  Patient currently have no signs of peripheral edema        Elevated troponin- (present on admission)   Assessment & Plan    Trop 0.21  No hx of CAD  cinthialey from OD  Recheck trop  Echo ordered        Lactic acidosis- (present on admission)   Assessment & Plan    fro OD  LA 2.3->1.6  Cont ivf        Acute kidney injury (CMS-HCC)- (present on admission)   Assessment & Plan    Cr 1.3 now normalized after IVF's  Cont to follow        Psychiatric disorder- (present on admission)   Assessment & Plan    Need record  monitor        Hypotension- (present on admission)   Assessment & Plan    Hx of   Currently normal  Patient was on steroid at home we will start half dose of them and needs confirmed with patient outpatient medication  ivf          Quality-Core Measures   Reviewed items::  Labs reviewed, Medications reviewed and Radiology images reviewed  DVT prophylaxis pharmacological::  Heparin  DVT prophylaxis - mechanical:  SCDs  Ulcer Prophylaxis::  Not indicated  Antibiotics:  Treating active infection/contamination beyond 24 hours perioperative coverage

## 2018-04-11 NOTE — CARE PLAN
Problem: Communication  Goal: The ability to communicate needs accurately and effectively will improve  Outcome: PROGRESSING SLOWER THAN EXPECTED  Can only say yes or no. Unable to speak in sentences.    Problem: Safety  Goal: Will remain free from injury  Outcome: PROGRESSING SLOWER THAN EXPECTED  Sitter at bedside due to agitation and restlessness.

## 2018-04-11 NOTE — PROGRESS NOTES
2 RN assessment completed with SHAHNAZ Lujan.  Patient has non-blanchable redness located to perineum/laverne-anal area.  Barrier paste applied.  Picture taken and in chart.  Wound consult ordered.

## 2018-04-11 NOTE — PROGRESS NOTES
Partial med rec completed per phone call with [facility] with recent fill history  Pt is unable to be interviewed at this time  No abx in past 30 days filled at [facility]  Pt has been filling hydrocortisone 10 mg tablets taken as 5 tablets every 8 hours  Midodrine is written for 10 mg QID

## 2018-04-11 NOTE — THERAPY
"Speech Language Therapy Clinical Swallow Evaluation completed.  Functional Status: The patient was seen for clinical swallow evaluation this date. The patient was awake, alert but minimally responsive to questions. Patient able to follow commands when attention was achieved to task. The patient was noted on right labial asymmetry. Unknown history but report of previous PEG tube and dysphagia. The patient was noted to have wet vocal quality with thin liquids. Nectars and purees were consumed without difficulty. At this time, recommend patient begin NTFL meal items with swallow strategies and 1;1 feeding assistance. Crush meds in puree.       Recommendations - Diet:  Nectar thick full liquid.                           Strategies: Strict 1:1 feeding  and No Straws                          Medication Administration:  Crush in puree.     Plan of Care: Will benefit from Speech Therapy 3 times per week  Post-Acute Therapy: Discharge to a transitional care facility for continued skilled therapy services.    See \"Rehab Therapy-Acute\" Patient Summary Report for complete documentation.   "

## 2018-04-11 NOTE — PROGRESS NOTES
Lawanda from [facility] called to report + Campylobacter in stool. Reported to Dr. Hernandes.

## 2018-04-11 NOTE — DISCHARGE PLANNING
Medical SW    Sw attended AM IDT Rounds.    RN reports, pt restless and more agitated today, humphreys in place,      Plan: Sw to assist w/ d/c planning as needed.

## 2018-04-11 NOTE — CARE PLAN
Problem: Bowel/Gastric:  Goal: Normal bowel function is maintained or improved  Multiple episodes of diarrhea throughout this shift.  Stool cultured, IV fluids administered for hydration, barrier wipes used and barrier paste applied.      Problem: Knowledge Deficit  Goal: Knowledge of disease process/condition, treatment plan, diagnostic tests, and medications will improve  Unable to educate appropriately at this time, patient unable to verbalize understanding.

## 2018-04-11 NOTE — WOUND TEAM
Patient seen for buttocks.  Patient turned self to right side with CNA, area assessed, red with satelite lesions, patient incontinent of urine.  Will order TOM with miconazole for apparent yeast infection.  No other needs at this time.  Please re-consult if any other needs come up.

## 2018-04-11 NOTE — CONSULTS
"DATE OF SERVICE:  04/10/2018    REQUESTING PHYSICIAN:  Jesús Palma DO    REASON FOR CONSULTATION:  Seizure.    HISTORY OF PRESENT ILLNESS:  The patient is a 52-year-old right-handed female   with past medical history significant for hypotension, history of chronic   pain, on chronic large doses of narcotic medication, and history of   unspecified psychiatric disorder, was transferred from an outside facility for   evaluation of alteration of mental status.  Apparently, paramedics were   called as the patient was found in her bed unresponsive and incontinent of   stool and urine.  The patient is visiting from [place], [place] and   apparently, received most of her care in [facility].  On the way to emergency   room, paramedics removed estradiol patch and buprenorphine patch from the   patient's skin.  The patient received a dose of Narcan at the emergency room   and apparently, she began to wake up and breathe more spontaneously.  She was   subsequently transferred to [facility] and received a second dose of Narcan and   became more awake.  She is now awake, but nonverbal, although she is able to   follow command and she was also able to tell me, \"I cannot or I do not.\"  I   asked her if she can tell me her name and she says she cannot.  The patient   underwent a brain CT, which did not reveal acute abnormalities.  Subsequently,   she underwent an EEG, which is reported as a focal cortical dysfunction and   focal irritability over the left with probable focal seizure from the left,   although as mentioned, there are no definite electrographic seizures in this   recording.    PAST MEDICAL HISTORY:  Her past medical history is significant for   hypotension, history of chronic pain, on large doses of narcotic medication,   history of unspecified psychiatric disorder.    MEDICATIONS:  Reviewed as per MAR.    ALLERGIES:  SULFAMETHOXAZOLE-TRIMETHOPRIM.    SOCIAL HISTORY:  She has no history of smoking, " drinking, or drug abuse   according to her medical record.    FAMILY HISTORY:  Unable to obtain.    REVIEW OF SYSTEMS:  Unable to obtain.  Please see Dr. Parker's review of   systems in his H and P dated 04/10/2018.    PHYSICAL EXAMINATION:  VITAL SIGNS:  On examination today, heart rate 79, blood pressure 146/82,   respiration 16, O2 sat 98% on 6 liter OxyMask.  Her temperature is 38.1.  NECK:  Supple.  No carotid bruit.  CARDIOVASCULAR:  Regular rate and rhythm.  LUNGS:  Clear.  ABDOMEN:  Soft.  EXTREMITIES:  No cyanosis or clubbing.  NEUROLOGIC:  She is awake, alert, able to track and say few words, but mostly   she is nonverbal.  She is able to follow command.  Her pupils are equal and   reactive.  Extraocular movements are full.  Visual fields are full to   confrontation.  Hearing is intact to finger rub bilaterally.  Tongue is   midline and protrudes symmetrically.  Palate elevates symmetrically.  Shoulder   shrugs are normal.  Motor examination revealed normal strength to direct   testing of both upper and lower extremity, proximal and distal.  Sensation   intact to light touch, temperature, and pinprick.  Coordination is intact to   finger-to-nose testing.  She was not able to perform heel-to-shin testing.    Deep tendon reflexes are 1+ and equal.  Plantar reflexes are downgoing.    ASSESSMENT AND PLAN:  A 52-year-old female who was found unresponsive,   incontinent of urine and bowel with evidence of narcotic overdose, which has   improved after receiving Narcan.  She underwent EEG reported by Dr. Churchill as   focal seizure from left and advised for anti-seizure medication.  I will start   her on Keppra 500 mg twice a day and continue to monitor clinically.  There   is questionable history of seizure in the past, although I am not sure if this   event was purely narcotic overdose complicated with seizure.  We will   continue with seizure medication for now and she needs to have follow up with   neurology to  repeat her EEG and decide about continuation of seizure   medication.  The patient is currently nonverbal, although it does not appear   she has expressive aphasia and she is able to say few sentences.  I expect   this will improve with time and observation.  If she continues to be   nonverbal, we will obtain brain MRI tomorrow.       ____________________________________     MD DONTA Estrada / SKYLER    DD:  04/10/2018 17:13:50  DT:  04/10/2018 17:38:07    D#:  [id number]  Job#:  [id number]

## 2018-04-11 NOTE — PROGRESS NOTES
"NEUROLOGY PROGRESS NOTE      Background:  52 y.o. female was admitted on 4/10/2018  6:43 AM for Overdose  Overdose  Overdose.  She is being followed by Neurology for possible seizure.    SUBJECTIVE: She remains nonverbal for most part, although, she is still say a few words and simple sentences mostly \"I don't know \", \" I can't\". I asked her to tell me her name and she didn't, however, after I told her her name, she repeated back to me. No seizure activity reported.    NEUROLOGICAL EXAM:  She is awake, alert, able to track and say few words, but mostly   she is nonverbal.  She is able to follow command.  Her pupils are equal and   reactive.  Extraocular movements are full.  Visual fields are full to   confrontation.  Hearing is intact to finger rub bilaterally.  Tongue is   midline and protrudes symmetrically.  Palate elevates symmetrically.  Shoulder   shrugs are normal.  Motor examination revealed normal strength to direct   testing of both upper and lower extremity, proximal and distal.  Sensation   intact to light touch, temperature, and pinprick.  Coordination is intact to   finger-to-nose testing.  She was not able to perform heel-to-shin testing.    Deep tendon reflexes are 1+ and equal.  Plantar reflexes are downgoing    OBJECTIVE:     NEUROIMAGING:      EEG:   EEG 04/10/18 2:49 PM  This scalp EEG is consistent with                  Focal cortical dysfunction and focal irritability over the left                 Probable focal seizures from the left      Advise anti-seizure medication     There are no definite electrographic seizures in this record though       MEDICATIONS:  Current Facility-Administered Medications   Medication Dose   • LORazepam (ATIVAN) injection 0.5 mg  0.5 mg   • HYDROmorphone (DILAUDID) tablet 2 mg  2 mg   • azithromycin (ZITHROMAX) tablet 500 mg  500 mg   • ampicillin/sulbactam (UNASYN) 3 g in  mL IVPB  3 g   • acetaminophen (TYLENOL) tablet 650 mg  650 mg   • labetalol " "(NORMODYNE,TRANDATE) injection 5 mg  5 mg   • aspirin EC (ECOTRIN) tablet 81 mg  81 mg   • senna-docusate (PERICOLACE or SENOKOT S) 8.6-50 MG per tablet 2 Tab  2 Tab    And   • polyethylene glycol/lytes (MIRALAX) PACKET 1 Packet  1 Packet    And   • magnesium hydroxide (MILK OF MAGNESIA) suspension 30 mL  30 mL    And   • bisacodyl (DULCOLAX) suppository 10 mg  10 mg   • NS infusion     • heparin injection 5,000 Units  5,000 Units   • hydrocortisone (CORTEF) tablet 5 mg  5 mg   • fludrocortisone (FLORINEF) tablet 0.05 mg  0.05 mg   • Respiratory Care per Protocol     • levETIRAcetam (KEPPRA) 100 MG/ML solution 500 mg  500 mg       Blood pressure 140/79, pulse 92, temperature 37.6 °C (99.6 °F), resp. rate 16, height 1.676 m (5' 6\"), weight 58 kg (127 lb 13.9 oz), SpO2 97 %.    Recent Labs      04/10/18   0330  04/10/18   0736  04/10/18   1550   CPKTOTAL  83  126   --    TROPONINI  0.21*   --   0.25*   BNPBTYPENAT  8030   --    --      Recent Labs      04/10/18   0330 04/11/18   0720   WBC  16.0*  13.5*   RBC  4.31  3.72*   HEMOGLOBIN  13.0  11.2*   HEMATOCRIT  39.4  33.6*   MCV  91.4  90.3   MCH  30.2  30.1   MCHC  33.0  33.3*   RDW  14.2  45.1   PLATELETCT  174  132*   MPV  11.0*  11.0     Recent Labs      04/10/18   0330  04/10/18   0736  04/11/18   0720   SODIUM  134*   --   143   POTASSIUM  4.7   --   3.8   CHLORIDE  103   --   111   CO2  23   --   22   GLUCOSE  130*   --   99   BUN  26*   --   19   CPKTOTAL  83  126   --        Results for orders placed or performed during the hospital encounter of 04/10/18   ECHOCARDIOGRAM COMP W/O CONT   Result Value Ref Range    Eject.Frac. MOD BP 73.64     Eject.Frac. MOD 4C 78.09     Eject.Frac. MOD 2C 67.55     Left Ventrical Ejection Fraction 70         ASSESSMENT AND PLAN:   A 52-year-old female who was found unresponsive,   incontinent of urine and bowel with evidence of narcotic overdose, which has   improved after receiving Narcan.  She underwent EEG reported by " Zhao as   focal seizure from left and advised for anti-seizure medication.   She was started on Keppra 500 mg twice a day.  We will   continue with seizure medication for now and she needs to have follow up with   neurology to repeat her EEG and decide about continuation of seizure   medication.  The patient remains nonverbal, we'll order a brain MRI.

## 2018-04-12 ENCOUNTER — APPOINTMENT (OUTPATIENT)
Dept: RADIOLOGY | Facility: MEDICAL CENTER | Age: 53
DRG: 917 | End: 2018-04-12
Attending: PSYCHIATRY & NEUROLOGY
Payer: COMMERCIAL

## 2018-04-12 PROBLEM — E87.1 HYPONATREMIA: Status: RESOLVED | Noted: 2018-04-10 | Resolved: 2018-04-12

## 2018-04-12 PROBLEM — G89.4 CHRONIC PAIN SYNDROME: Status: ACTIVE | Noted: 2018-04-12

## 2018-04-12 PROBLEM — E87.6 HYPOKALEMIA: Status: ACTIVE | Noted: 2018-04-12

## 2018-04-12 PROBLEM — A04.5 CAMPYLOBACTER DIARRHEA: Status: ACTIVE | Noted: 2018-04-11

## 2018-04-12 PROBLEM — E87.20 LACTIC ACIDOSIS: Status: RESOLVED | Noted: 2018-04-10 | Resolved: 2018-04-12

## 2018-04-12 PROBLEM — I63.9 CEREBROVASCULAR ACCIDENT (CVA) DUE TO THROMBOSIS (HCC): Status: ACTIVE | Noted: 2018-04-12

## 2018-04-12 PROBLEM — J69.0 ASPIRATION PNEUMONIA (HCC): Chronic | Status: ACTIVE | Noted: 2018-04-11

## 2018-04-12 LAB
ALBUMIN SERPL BCP-MCNC: 3.7 G/DL (ref 3.2–4.9)
ALBUMIN/GLOB SERPL: 1.4 G/DL
ALP SERPL-CCNC: 46 U/L (ref 30–99)
ALT SERPL-CCNC: 47 U/L (ref 2–50)
ANION GAP SERPL CALC-SCNC: 12 MMOL/L (ref 0–11.9)
AST SERPL-CCNC: 40 U/L (ref 12–45)
BASOPHILS # BLD AUTO: 0.2 % (ref 0–1.8)
BASOPHILS # BLD: 0.03 K/UL (ref 0–0.12)
BILIRUB SERPL-MCNC: 1.2 MG/DL (ref 0.1–1.5)
BUN SERPL-MCNC: 16 MG/DL (ref 8–22)
CALCIUM SERPL-MCNC: 8.5 MG/DL (ref 8.5–10.5)
CHLORIDE SERPL-SCNC: 109 MMOL/L (ref 96–112)
CHOLEST SERPL-MCNC: 132 MG/DL (ref 100–199)
CO2 SERPL-SCNC: 22 MMOL/L (ref 20–33)
CREAT SERPL-MCNC: 0.77 MG/DL (ref 0.5–1.4)
EOSINOPHIL # BLD AUTO: 0 K/UL (ref 0–0.51)
EOSINOPHIL NFR BLD: 0 % (ref 0–6.9)
ERYTHROCYTE [DISTWIDTH] IN BLOOD BY AUTOMATED COUNT: 43.5 FL (ref 35.9–50)
GLOBULIN SER CALC-MCNC: 2.6 G/DL (ref 1.9–3.5)
GLUCOSE SERPL-MCNC: 99 MG/DL (ref 65–99)
HCT VFR BLD AUTO: 37.8 % (ref 37–47)
HDLC SERPL-MCNC: 57 MG/DL
HGB BLD-MCNC: 12.8 G/DL (ref 12–16)
IMM GRANULOCYTES # BLD AUTO: 0.08 K/UL (ref 0–0.11)
IMM GRANULOCYTES NFR BLD AUTO: 0.4 % (ref 0–0.9)
LDLC SERPL CALC-MCNC: 54 MG/DL
LYMPHOCYTES # BLD AUTO: 1.71 K/UL (ref 1–4.8)
LYMPHOCYTES NFR BLD: 9 % (ref 22–41)
MAGNESIUM SERPL-MCNC: 1.8 MG/DL (ref 1.5–2.5)
MCH RBC QN AUTO: 30 PG (ref 27–33)
MCHC RBC AUTO-ENTMCNC: 33.9 G/DL (ref 33.6–35)
MCV RBC AUTO: 88.5 FL (ref 81.4–97.8)
MONOCYTES # BLD AUTO: 1.38 K/UL (ref 0–0.85)
MONOCYTES NFR BLD AUTO: 7.3 % (ref 0–13.4)
NEUTROPHILS # BLD AUTO: 15.77 K/UL (ref 2–7.15)
NEUTROPHILS NFR BLD: 83.1 % (ref 44–72)
NRBC # BLD AUTO: 0 K/UL
NRBC BLD-RTO: 0 /100 WBC
PLATELET # BLD AUTO: 147 K/UL (ref 164–446)
PMV BLD AUTO: 10.6 FL (ref 9–12.9)
POTASSIUM SERPL-SCNC: 3 MMOL/L (ref 3.6–5.5)
PROT SERPL-MCNC: 6.3 G/DL (ref 6–8.2)
RBC # BLD AUTO: 4.27 M/UL (ref 4.2–5.4)
SODIUM SERPL-SCNC: 143 MMOL/L (ref 135–145)
TRIGL SERPL-MCNC: 103 MG/DL (ref 0–149)
WBC # BLD AUTO: 19 K/UL (ref 4.8–10.8)

## 2018-04-12 PROCEDURE — 700111 HCHG RX REV CODE 636 W/ 250 OVERRIDE (IP): Performed by: HOSPITALIST

## 2018-04-12 PROCEDURE — 700111 HCHG RX REV CODE 636 W/ 250 OVERRIDE (IP): Performed by: INTERNAL MEDICINE

## 2018-04-12 PROCEDURE — 85025 COMPLETE CBC W/AUTO DIFF WBC: CPT

## 2018-04-12 PROCEDURE — 83735 ASSAY OF MAGNESIUM: CPT

## 2018-04-12 PROCEDURE — A9270 NON-COVERED ITEM OR SERVICE: HCPCS | Performed by: HOSPITALIST

## 2018-04-12 PROCEDURE — 700105 HCHG RX REV CODE 258: Performed by: HOSPITALIST

## 2018-04-12 PROCEDURE — 700102 HCHG RX REV CODE 250 W/ 637 OVERRIDE(OP): Performed by: INTERNAL MEDICINE

## 2018-04-12 PROCEDURE — 160002 HCHG RECOVERY MINUTES (STAT)

## 2018-04-12 PROCEDURE — 700117 HCHG RX CONTRAST REV CODE 255: Performed by: PSYCHIATRY & NEUROLOGY

## 2018-04-12 PROCEDURE — 700111 HCHG RX REV CODE 636 W/ 250 OVERRIDE (IP)

## 2018-04-12 PROCEDURE — A9270 NON-COVERED ITEM OR SERVICE: HCPCS | Performed by: INTERNAL MEDICINE

## 2018-04-12 PROCEDURE — 80061 LIPID PANEL: CPT

## 2018-04-12 PROCEDURE — 700102 HCHG RX REV CODE 250 W/ 637 OVERRIDE(OP): Performed by: HOSPITALIST

## 2018-04-12 PROCEDURE — 302146: Performed by: HOSPITALIST

## 2018-04-12 PROCEDURE — 70553 MRI BRAIN STEM W/O & W/DYE: CPT

## 2018-04-12 PROCEDURE — 770020 HCHG ROOM/CARE - TELE (206)

## 2018-04-12 PROCEDURE — A9579 GAD-BASE MR CONTRAST NOS,1ML: HCPCS | Performed by: PSYCHIATRY & NEUROLOGY

## 2018-04-12 PROCEDURE — 36415 COLL VENOUS BLD VENIPUNCTURE: CPT

## 2018-04-12 PROCEDURE — 700111 HCHG RX REV CODE 636 W/ 250 OVERRIDE (IP): Performed by: NURSE PRACTITIONER

## 2018-04-12 PROCEDURE — 80053 COMPREHEN METABOLIC PANEL: CPT

## 2018-04-12 PROCEDURE — 99233 SBSQ HOSP IP/OBS HIGH 50: CPT | Performed by: HOSPITALIST

## 2018-04-12 RX ORDER — POTASSIUM CHLORIDE 20 MEQ/1
60 TABLET, EXTENDED RELEASE ORAL DAILY
Status: DISCONTINUED | OUTPATIENT
Start: 2018-04-12 | End: 2018-04-13

## 2018-04-12 RX ORDER — HYDROCODONE BITARTRATE AND ACETAMINOPHEN 5; 325 MG/1; MG/1
1 TABLET ORAL EVERY 6 HOURS PRN
Status: DISCONTINUED | OUTPATIENT
Start: 2018-04-12 | End: 2018-04-14

## 2018-04-12 RX ORDER — MAGNESIUM SULFATE 1 G/100ML
1 INJECTION INTRAVENOUS ONCE
Status: COMPLETED | OUTPATIENT
Start: 2018-04-12 | End: 2018-04-12

## 2018-04-12 RX ADMIN — AMPICILLIN SODIUM AND SULBACTAM SODIUM 3 G: 2; 1 INJECTION, POWDER, FOR SOLUTION INTRAMUSCULAR; INTRAVENOUS at 00:14

## 2018-04-12 RX ADMIN — MICONAZOLE NITRATE: 20 CREAM TOPICAL at 16:00

## 2018-04-12 RX ADMIN — MICONAZOLE NITRATE: 20 CREAM TOPICAL at 11:18

## 2018-04-12 RX ADMIN — AMPICILLIN SODIUM AND SULBACTAM SODIUM 3 G: 2; 1 INJECTION, POWDER, FOR SOLUTION INTRAMUSCULAR; INTRAVENOUS at 18:13

## 2018-04-12 RX ADMIN — AMPICILLIN SODIUM AND SULBACTAM SODIUM 3 G: 2; 1 INJECTION, POWDER, FOR SOLUTION INTRAMUSCULAR; INTRAVENOUS at 12:00

## 2018-04-12 RX ADMIN — MAGNESIUM SULFATE IN DEXTROSE 1 G: 10 INJECTION, SOLUTION INTRAVENOUS at 19:19

## 2018-04-12 RX ADMIN — MICONAZOLE NITRATE: 20 CREAM TOPICAL at 21:44

## 2018-04-12 RX ADMIN — GADODIAMIDE 15 ML: 287 INJECTION INTRAVENOUS at 14:29

## 2018-04-12 RX ADMIN — LEVETIRACETAM 500 MG: 100 SOLUTION ORAL at 11:19

## 2018-04-12 RX ADMIN — AMPICILLIN SODIUM AND SULBACTAM SODIUM 3 G: 2; 1 INJECTION, POWDER, FOR SOLUTION INTRAMUSCULAR; INTRAVENOUS at 06:44

## 2018-04-12 RX ADMIN — AZITHROMYCIN 500 MG: 250 TABLET, FILM COATED ORAL at 11:19

## 2018-04-12 RX ADMIN — LEVETIRACETAM 500 MG: 100 SOLUTION ORAL at 21:42

## 2018-04-12 RX ADMIN — HEPARIN SODIUM 5000 UNITS: 5000 INJECTION, SOLUTION INTRAVENOUS; SUBCUTANEOUS at 21:43

## 2018-04-12 RX ADMIN — LORAZEPAM 0.5 MG: 2 INJECTION INTRAMUSCULAR; INTRAVENOUS at 21:43

## 2018-04-12 RX ADMIN — ATROPINE SULFATE 0.4 MG: 0.1 INJECTION, SOLUTION INTRAVENOUS at 14:39

## 2018-04-12 RX ADMIN — HEPARIN SODIUM 5000 UNITS: 5000 INJECTION, SOLUTION INTRAVENOUS; SUBCUTANEOUS at 06:44

## 2018-04-12 RX ADMIN — HYDROCORTISONE 5 MG: 5 TABLET ORAL at 11:19

## 2018-04-12 RX ADMIN — MICONAZOLE NITRATE: 20 CREAM TOPICAL at 02:00

## 2018-04-12 ASSESSMENT — ENCOUNTER SYMPTOMS
DIARRHEA: 1
SHORTNESS OF BREATH: 0
BLOOD IN STOOL: 0
ABDOMINAL PAIN: 0
NAUSEA: 0
VOMITING: 0

## 2018-04-12 ASSESSMENT — PAIN SCALES - GENERAL: PAINLEVEL_OUTOF10: 0

## 2018-04-12 NOTE — PROGRESS NOTES
Monitor Summary: SR , GA .18, QRS .08, QT .34 with r pvc linda to 40 per strip from monitor room

## 2018-04-12 NOTE — PROGRESS NOTES
Pt is responsive to her name, unable to tell me her name or birth date. Sitter at bedside for safety. IV fluids infusing as ordered. Medications given as ordered, tylenol given for temp 100.7. Pt frequently incontinent, pericare provided. Pts bottom is very red, blanchable, cream applied. Will continue to monitor.

## 2018-04-12 NOTE — PROGRESS NOTES
2 RN skin check completed with SHAHNAZ Galindo  Blanching redness noted to laverne-area. No other wound, rash or breakdown noted.

## 2018-04-12 NOTE — PROGRESS NOTES
IR Procedure Note:    MRI of brain with and without contrast with general anesthesia by Dr. Cardona. All vital sign monitoring and medication administration by anesthesia services. - See anesthesia record.  The pt tolerated the procedure well. Report given to SHAHNAZ Arias.  Dr. Cardona, SHAHNAZ  and sitter transported pt to [address] with [place] monitor = 97 % on 4 lpm oxygen. .

## 2018-04-12 NOTE — OR NURSING
Dr Webb at bedside to reassess patient.  VSS. Confused but as pre-op. Sitter at bedside.  Report called to floor rn. Ok to transfer.

## 2018-04-12 NOTE — PSYCHIATRY
Full note to follow. Consulted for AMS, strange behaviors, not talking. Admitted for opiate OD, diarrhea, seizures, and lactic acidosis. Mental status was very poor before starting keppra, so unlikely to be caused from this drug.     Currently barely communicative. She is not freezing or holding postures. Her leg shaking appears to be in response to frustration; it is not a functional (or organic) neuro symptom. She isn't oriented. She does not appear to have any true catatonic symtpoms aside from the lack of speech, which leads me to think this is still delirium. If it doesn't resolve as her physical status improves recommend starting ativan at a higher dose and more regularly. Of note, no psych history noted in brief review of notes from  and CHELSEA Kelly, aside from opiate dependence. Certain medication wtihdrawals can cause this kind of syndrome- withdrawal from baclofen, strongly anticholinergic medication, and MAOIs as examples; would try to ensure she isn't withdrawing from one of these.     MRI ordered, await results

## 2018-04-12 NOTE — OR NURSING
"Patient to pacu after mri with Dr Cardona at bedside.  Patient hr 40\"s. To given atropine iv as ordered.  Patient hx of cva and not cooperative.  Sitter at bedside for patient safety  "

## 2018-04-12 NOTE — ASSESSMENT & PLAN NOTE
Pain controlled.  Tapering down as tolerated  Possible unintentional narcotics overdose suspected on admit

## 2018-04-12 NOTE — PROGRESS NOTES
[facility] Hospitalist Progress Note    Date of Service: 2018    Chief Complaint  53 y.o. female admitted 4/10/2018 for AMS secondary to opiate OD. Found to have campylobacter in her stool.    Interval Problem Update  Still having frequent non-bloody watery diarrhea.  Tmax 100.7 overnight.  Very somnolent. Difficult to communicate with.  WBC up.  Potassium down today.  No new complaints.    Consultants/Specialty  Neuro  Psychiatry    Disposition  SLP recommending post-acute placement. Order placed for PT/OT.  Will determine dispo after their evaluation.      Review of Systems   Reason unable to perform ROS: Limited due to mental status.   Constitutional: Positive for malaise/fatigue.   Respiratory: Negative for shortness of breath.    Cardiovascular: Negative for chest pain.   Gastrointestinal: Positive for diarrhea. Negative for abdominal pain, blood in stool, nausea and vomiting.        Physical Exam  Laboratory/Imaging   Hemodynamics  Temp (24hrs), Av.1 °C (98.7 °F), Min:35.9 °C (96.6 °F), Max:38.2 °C (100.7 °F)   Temperature: 37.2 °C (98.9 °F)  Pulse  Av.4  Min: 52  Max: 110 Heart Rate (Monitored): 68  Blood Pressure: 159/92, NIBP: 141/75      Respiratory  Respiration: 16, Pulse Oximetry: 96 %  RUL Breath Sounds: Clear, RML Breath Sounds: Clear, RLL Breath Sounds: Clear, JOLYNN Breath Sounds: Clear, LLL Breath Sounds: Clear    Fluids    Intake/Output Summary (Last 24 hours) at 18 0934  Last data filed at 18 2300   Gross per 24 hour   Intake             2116 ml   Output              300 ml   Net             1816 ml     Nutrition  Orders Placed This Encounter   Procedures   • DIET ORDER     Standing Status:   Standing     Number of Occurrences:   1     Order Specific Question:   Diet:     Answer:   Regular [1]     Order Specific Question:   Consistency/Fluid modifications:     Answer:   Nectar Thick [2]     Physical Exam   Constitutional: She appears lethargic. No distress.   Thin     HENT:    Head: Normocephalic and atraumatic.   Eyes: Pupils are equal, round, and reactive to light.   Neck: Normal range of motion. Neck supple. No JVD present.   Cardiovascular: Normal rate, regular rhythm, normal heart sounds and intact distal pulses.  Exam reveals no gallop and no friction rub.    No murmur heard.  Pulmonary/Chest: Effort normal and breath sounds normal. No respiratory distress. She has no wheezes. She has no rales.   Not on O2   Abdominal: Soft. Normal appearance. She exhibits no distension. Bowel sounds are decreased. There is no tenderness. There is no rebound and no guarding.   Genitourinary:   Genitourinary Comments: Incontinent of urine & stool.   Musculoskeletal: Normal range of motion. She exhibits no edema.   Neurological: She appears lethargic. She is disoriented. No sensory deficit. GCS eye subscore is 3. GCS verbal subscore is 4. GCS motor subscore is 6.   RUE weakness  BLE jerking movements with physical stimulation   Skin: Skin is warm and dry. She is not diaphoretic.   Psychiatric: Her affect is angry (Irritable). Her speech is delayed. Cognition and memory are impaired. She expresses impulsivity. She exhibits abnormal recent memory and abnormal remote memory. She is inattentive.   Nursing note and vitals reviewed.    Recent Labs      04/10/18   0330  04/11/18   0720  04/12/18   0235   WBC  16.0*  13.5*  19.0*   RBC  4.31  3.72*  4.27   HEMOGLOBIN  13.0  11.2*  12.8   HEMATOCRIT  39.4  33.6*  37.8   MCV  91.4  90.3  88.5   MCH  30.2  30.1  30.0   MCHC  33.0  33.3*  33.9   RDW  14.2  45.1  43.5   PLATELETCT  174  132*  147*   MPV  11.0*  11.0  10.6     Recent Labs      04/10/18   0330  04/11/18   0720  04/12/18   0235   SODIUM  134*  143  143   POTASSIUM  4.7  3.8  3.0*   CHLORIDE  103  111  109   CO2  23  22  22   GLUCOSE  130*  99  99   BUN  26*  19  16   CREATININE  1.3*  0.79  0.77   CALCIUM  7.9*  8.0*  8.5         Recent Labs      04/10/18   0330   BNPBTYPENAT  8030               Assessment/Plan     * Mental status change- (present on admission)   Assessment & Plan    Buprenorphine patch removed PTA. Patient was on heavy doses of narcotics at home for chronic pain.  Initial CT scan was WNL.  Still very somnulent. Jerking noted in BLE, along with RUE weakness.  Will discuss with neuro.  MRI with anesthesia is pending.  EEG showed left sided abnormality.  On 500 mg Keppra IV BID.  Neuro to decide when to change to PO.  Neurology on.            Chronic pain syndrome   Assessment & Plan    Prn IV dilaudid discontinued. Will use norco instead, sparingly.  Not currently complaining of any pain.          Hypokalemia   Assessment & Plan    Secondary to frequent diarrhea.  Repleted with 60 mEq PO KCl.  Will recheck level in a.m.  Check mag level today.        Aspiration pneumonia (CMS-HCC)- (present on admission)   Assessment & Plan    On day 2 of unasyn (total 5 days needed).  Repeat cxr in appox 4-6 weeks to assess for resolution. Will continue to monitor respiratory status closely while inpatient.  No respiratory distress noted, lungs are CTABL.  Not currently requiring supplemental O2.          Campylobacter diarrhea- (present on admission)   Assessment & Plan    Campylobacter on cultures from transferring facility. C-diff negative here on 4/10/18.  Still having frequent bouts of watery, yellow diarrhea.  No physical exam findings of enteritis.  Tmax overnight 100.7.  WBC up to 19.  Day 2 of azithro (total 5 days needed)        Hyperglycemia- (present on admission)   Assessment & Plan    Likely due to acute illness.  Back to normal on a.m. labs.  A1C normal at 5.4. No hx of DM.          Leukocytosis- (present on admission)   Assessment & Plan    WBC up to 19 (was 13.5 yesterday). Temp 100.7 yesterday evening, has otherwise been afebrile overnight.  Likely related to infection at this point, asp pna vs campylobacter.  More likely campylobacter, as her asp pna is clinically stable.  On unasyn &  evelyn.  Procalcitonin normal.  Will recheck tomorrow.          Elevated brain natriuretic peptide (BNP) level- (present on admission)   Assessment & Plan    BNP 8000s on arrival.  TTE showed normal diastolic function & EF 70%.  No evidence of FVO on exam.        Elevated troponin- (present on admission)   Assessment & Plan    Trop mildly increased to 0.25, likely secondary to OD. Pt denies CP, SOB.  No known hx of CAD.  Normal echocardiogram.        Acute kidney injury (CMS-HCC)- (present on admission)   Assessment & Plan    Creatinine normalized after IV fluids        Psychiatric disorder- (present on admission)   Assessment & Plan    Attempting to obtain records.  Will try to find out details from significant other.  Psychiatry consult placed.        Hypotension- (present on admission)   Assessment & Plan    History of. On hydrocortisone & midodrine at home.  Was placed on half her normal dose hydrocortisone in addition to florinef on admit. BPs overnight were 150s systolic. Will try off of florinef & continue to monitor her BP closely.            Quality-Core Measures   Reviewed items::  Labs reviewed, Medications reviewed and Radiology images reviewed  Bishop catheter::  No Bishop  DVT prophylaxis pharmacological::  Heparin  Ulcer Prophylaxis::  Not indicated  Assessed for rehabilitation services:  Patient was assess for and/or received rehabilitation services during this hospitalization

## 2018-04-12 NOTE — NON-PROVIDER
"    PSYCHIATRIC CONSULTATION NOTE    Reason for Admission: Opiate overdose, diarrhea, seizures, and lactic acidosis  Reason for Consult: Altered mental status, strange behavior, lack of speech  Requesting Provider: BLANCA Esquivel   Supervising Psychiatric Attending: Tina Gutierrez M.D.  Source of Information: Patient and chart    Legal Status: Voluntary    Chief Complaint: Patient unable to verbalize.  Consulted for AMS, strange behavior, and lack of speech    HPI :   Ms. Shasta Dunn is a 53-year-old  female admitted for opioid overdose as well as unspecified seizure disorder.  History is unclear on patient's past seizure activity, however, EEG since admission demonstrated focal seizure activity in temporal region.  Upon introduction and interviewing patient, it is quickly apparent that she is agitated and cannot, or will not, verbalize to communicate.  Patient was asked a series of yes/no questions which she responded that she did not know where she was, did not know the city she was in, and did not know the state she was in.     She physically responds that she did not purposely overdose on pills prior to coming to the hospital as well as denies that she did not accidentally overdose on pills, however the interpretation is difficult as patient will not speak and does little more than shake her head up and down or back and forth.  She shakes her head \"no\" when asked if she has ever been treated for a psychiatric illness in the past or ever seen a psychiatrist.  She also shakes her head \"no\" when asked if she has ever seen things or heard things other people could not.  Frequently throughout the interview when asked a question she shakes her legs in a restless fashion as if to show agitation.  She lifted her gown to signify that she had soiled herself, however, continued to refuse verbalization.        Objective:  Blood pressure 159/92, pulse 82, temperature 37.2 °C (98.9 °F), resp. " "rate 16, height 1.676 m (5' 6\"), weight 58 kg (127 lb 13.9 oz), SpO2 96 %.      MSE :  Appearance: Patient is a very thin  woman who lays contorted in bed in obvious discomfort.  Her shoulder length blonde hair remains unkempt.  Her hygiene is fair.  She is not malodorous.   Behavior: Patient writhes in the bed frequently showing signs of frustration and discomfort.  She makes eye contact when specifically directed to do so, however, looks off in the other direction soon thereafter.   Speech : Complete lack of speech.  Patient communicates with head gestures, however, answers all questions with a \"no\" gesture.  Language: unable to assess.   Mood: Patient does not give any indication to what her mood is given her lack of speech and physical clues of agitation/frustation.   Affect: Blunted  Thought Process: Unable to assess, however, patient is nonverbal and not oriented therefore likely abnormalities here.  Thought Content: Unable to assess.  Attention/Concentration: Poor.  Patient focuses on interviewers for moments and is distracted easily.  Memory: Unable to assess.  Orientation: Not oriented.   Neurological: Deferred  Fund of Knowledge: Unable to assess but seemingly abnormal.  Insight/Judgment: Poor.         Past Psych Hx:    Unknown.  Patient has mention in her transferring documents of a past psychiatric illness, however, there is no mention of the diagnosis and patient denies ever having been treated for psychiatric illness or seeing a psychiatrist before.                                  Family Psych Hx:   Unable to assess in patient's current condition.  Will defer until patient able to communicate more effectively.      Social Hx:       Social History     Social History   • Marital status:      Spouse name: N/A   • Number of children: N/A   • Years of education: N/A     Occupational History   • Not on file.     Social History Main Topics   • Smoking status: Unknown If Ever Smoked   • " Smokeless tobacco: Never Used   • Alcohol use No   • Drug use: No   • Sexual activity: Not on file     Other Topics Concern   • Not on file     Social History Narrative   • No narrative on file         Drugs/Alcohol Hx:   Unknown, however, given patient's recent opioid overdose it is reasonably to be concerned for opioid use disorder.  Unknown if patient uses alcohol.                         MEDICAL Hx: labs, MARS, medications, etc were reviewed. Findings of potential interest to psychiatry are noted below:    Past Medical History:   Diagnosis Date   • Hypotension    • Psychiatric disorder          Recent Results (from the past 48 hour(s))   ECHOCARDIOGRAM COMP W/O CONT    Collection Time: 04/10/18  3:01 PM   Result Value Ref Range    Eject.Frac. MOD BP 73.64     Eject.Frac. MOD 4C 78.09     Eject.Frac. MOD 2C 67.55     Left Ventrical Ejection Fraction 70    TROPONIN    Collection Time: 04/10/18  3:50 PM   Result Value Ref Range    Troponin I 0.25 (H) 0.00 - 0.04 ng/mL   CBC WITH DIFFERENTIAL    Collection Time: 04/11/18  7:20 AM   Result Value Ref Range    WBC 13.5 (H) 4.8 - 10.8 K/uL    RBC 3.72 (L) 4.20 - 5.40 M/uL    Hemoglobin 11.2 (L) 12.0 - 16.0 g/dL    Hematocrit 33.6 (L) 37.0 - 47.0 %    MCV 90.3 81.4 - 97.8 fL    MCH 30.1 27.0 - 33.0 pg    MCHC 33.3 (L) 33.6 - 35.0 g/dL    RDW 45.1 35.9 - 50.0 fL    Platelet Count 132 (L) 164 - 446 K/uL    MPV 11.0 9.0 - 12.9 fL    Neutrophils-Polys 80.40 (H) 44.00 - 72.00 %    Lymphocytes 10.40 (L) 22.00 - 41.00 %    Monocytes 8.40 0.00 - 13.40 %    Eosinophils 0.00 0.00 - 6.90 %    Basophils 0.40 0.00 - 1.80 %    Immature Granulocytes 0.40 0.00 - 0.90 %    Nucleated RBC 0.00 /100 WBC    Neutrophils (Absolute) 10.89 (H) 2.00 - 7.15 K/uL    Lymphs (Absolute) 1.41 1.00 - 4.80 K/uL    Monos (Absolute) 1.13 (H) 0.00 - 0.85 K/uL    Eos (Absolute) 0.00 0.00 - 0.51 K/uL    Baso (Absolute) 0.05 0.00 - 0.12 K/uL    Immature Granulocytes (abs) 0.05 0.00 - 0.11 K/uL    NRBC  (Absolute) 0.00 K/uL   COMP METABOLIC PANEL    Collection Time: 04/11/18  7:20 AM   Result Value Ref Range    Sodium 143 135 - 145 mmol/L    Potassium 3.8 3.6 - 5.5 mmol/L    Chloride 111 96 - 112 mmol/L    Co2 22 20 - 33 mmol/L    Anion Gap 10.0 0.0 - 11.9    Glucose 99 65 - 99 mg/dL    Bun 19 8 - 22 mg/dL    Creatinine 0.79 0.50 - 1.40 mg/dL    Calcium 8.0 (L) 8.5 - 10.5 mg/dL    AST(SGOT) 37 12 - 45 U/L    ALT(SGPT) 37 2 - 50 U/L    Alkaline Phosphatase 39 30 - 99 U/L    Total Bilirubin 0.8 0.1 - 1.5 mg/dL    Albumin 3.5 3.2 - 4.9 g/dL    Total Protein 5.9 (L) 6.0 - 8.2 g/dL    Globulin 2.4 1.9 - 3.5 g/dL    A-G Ratio 1.5 g/dL   PROCALCITONIN    Collection Time: 04/11/18  7:20 AM   Result Value Ref Range    Procalcitonin <0.05 <0.25 ng/mL   ESTIMATED GFR    Collection Time: 04/11/18  7:20 AM   Result Value Ref Range    GFR If African American >60 >60 mL/min/1.73 m 2    GFR If Non African American >60 >60 mL/min/1.73 m 2   CBC WITH DIFFERENTIAL    Collection Time: 04/12/18  2:35 AM   Result Value Ref Range    WBC 19.0 (H) 4.8 - 10.8 K/uL    RBC 4.27 4.20 - 5.40 M/uL    Hemoglobin 12.8 12.0 - 16.0 g/dL    Hematocrit 37.8 37.0 - 47.0 %    MCV 88.5 81.4 - 97.8 fL    MCH 30.0 27.0 - 33.0 pg    MCHC 33.9 33.6 - 35.0 g/dL    RDW 43.5 35.9 - 50.0 fL    Platelet Count 147 (L) 164 - 446 K/uL    MPV 10.6 9.0 - 12.9 fL    Neutrophils-Polys 83.10 (H) 44.00 - 72.00 %    Lymphocytes 9.00 (L) 22.00 - 41.00 %    Monocytes 7.30 0.00 - 13.40 %    Eosinophils 0.00 0.00 - 6.90 %    Basophils 0.20 0.00 - 1.80 %    Immature Granulocytes 0.40 0.00 - 0.90 %    Nucleated RBC 0.00 /100 WBC    Neutrophils (Absolute) 15.77 (H) 2.00 - 7.15 K/uL    Lymphs (Absolute) 1.71 1.00 - 4.80 K/uL    Monos (Absolute) 1.38 (H) 0.00 - 0.85 K/uL    Eos (Absolute) 0.00 0.00 - 0.51 K/uL    Baso (Absolute) 0.03 0.00 - 0.12 K/uL    Immature Granulocytes (abs) 0.08 0.00 - 0.11 K/uL    NRBC (Absolute) 0.00 K/uL   COMP METABOLIC PANEL    Collection Time: 04/12/18   2:35 AM   Result Value Ref Range    Sodium 143 135 - 145 mmol/L    Potassium 3.0 (L) 3.6 - 5.5 mmol/L    Chloride 109 96 - 112 mmol/L    Co2 22 20 - 33 mmol/L    Anion Gap 12.0 (H) 0.0 - 11.9    Glucose 99 65 - 99 mg/dL    Bun 16 8 - 22 mg/dL    Creatinine 0.77 0.50 - 1.40 mg/dL    Calcium 8.5 8.5 - 10.5 mg/dL    AST(SGOT) 40 12 - 45 U/L    ALT(SGPT) 47 2 - 50 U/L    Alkaline Phosphatase 46 30 - 99 U/L    Total Bilirubin 1.2 0.1 - 1.5 mg/dL    Albumin 3.7 3.2 - 4.9 g/dL    Total Protein 6.3 6.0 - 8.2 g/dL    Globulin 2.6 1.9 - 3.5 g/dL    A-G Ratio 1.4 g/dL   ESTIMATED GFR    Collection Time: 04/12/18  2:35 AM   Result Value Ref Range    GFR If African American >60 >60 mL/min/1.73 m 2    GFR If Non African American >60 >60 mL/min/1.73 m 2   MAGNESIUM    Collection Time: 04/12/18  2:35 AM   Result Value Ref Range    Magnesium 1.8 1.5 - 2.5 mg/dL         Medications:   Current Facility-Administered Medications   Medication Dose Route Frequency Provider Last Rate Last Dose   • potassium chloride SA (Kdur) tablet 60 mEq  60 mEq Oral DAILY Shirin Davis, A.P.R.N.       • HYDROcodone-acetaminophen (NORCO) 5-325 MG per tablet 1 Tab  1 Tab Oral Q6HRS PRN Shirin Davis, A.P.R.N.       • LORazepam (ATIVAN) injection 0.5 mg  0.5 mg Intravenous Q6HRS PRN Justice Lucio M.D.   0.5 mg at 04/11/18 2108   • azithromycin (ZITHROMAX) tablet 500 mg  500 mg Oral DAILY Jesús Palma D.O.   500 mg at 04/12/18 1119   • ampicillin/sulbactam (UNASYN) 3 g in  mL IVPB  3 g Intravenous Q6HRS Jesús Palma D.O.   Stopped at 04/12/18 0714   • miconazole 2%-zinc oxide (TOM) topical cream   Topical Q6HR Jesús Palma D.O.       • acetaminophen (TYLENOL) tablet 650 mg  650 mg Oral Q6HRS PRN Cierra Parker M.D.   650 mg at 04/11/18 2102   • labetalol (NORMODYNE,TRANDATE) injection 5 mg  5 mg Intravenous Q4HRS PRN Cierra Parker M.D.       • aspirin EC (ECOTRIN) tablet 81 mg  81 mg Oral DAILY Cierra Parker M.D.       •  senna-docusate (PERICOLACE or SENOKOT S) 8.6-50 MG per tablet 2 Tab  2 Tab Oral BID Cierra Parker M.D.   Stopped at 04/10/18 0900    And   • polyethylene glycol/lytes (MIRALAX) PACKET 1 Packet  1 Packet Oral QDAY PRARIN Parker M.D.        And   • magnesium hydroxide (MILK OF MAGNESIA) suspension 30 mL  30 mL Oral QDAY PRN Cierra Parker M.D.        And   • bisacodyl (DULCOLAX) suppository 10 mg  10 mg Rectal QDAY PRN Cierra Parker M.D.       • NS infusion   Intravenous Continuous Cierra Parker M.D. 83 mL/hr at 04/11/18 1149     • heparin injection 5,000 Units  5,000 Units Subcutaneous Q8HRS Cierra Parker M.D.   5,000 Units at 04/12/18 0644   • hydrocortisone (CORTEF) tablet 5 mg  5 mg Oral DAILY Cierra Parker M.D.   5 mg at 04/12/18 1119   • Respiratory Care per Protocol   Nebulization Continuous RT Cierra Parker M.D.       • levETIRAcetam (KEPPRA) 100 MG/ML solution 500 mg  500 mg Oral Q12HRS Jesús Palma D.OCharlie   500 mg at 04/12/18 1119       Allergies: Sulfamethoxazole-trimethoprim    Imaging:   CT Scan Performed 4/10/2018:  There is no evidence of mass, mass effect, hemorrhage, or extraaxial fluid collection. There is no midline shift.    The ventricular and sulcal patterns are age appropriate.    There is no acute cortical infarction.    There is no fracture or other acute bony abnormality seen.    Visualized paranasal sinuses: Clear.    MRI of brain with and without contrast performed, awaiting results.     EKG: None on file.     Medical Review of Systems: as reported by pt. All systems reviewed. Only those found to be + are noted below. All others are negative.   Neurological:    TBIs: Unknown.   Szs: Patient currently being treated for seizures with Keppra.    Strokes: Unknown.  Other medical symptoms:   Thyroid: Unknown, however, unlikely    Diabetes: Unknown, however, patient's glucose range from  since  admission   Cardiovascular disease: Unknown, however, echocardiogram performed  4/10/2018:     CONCLUSIONS   Mildly  dilated right ventricle.   Left ventricular ejection fraction is visually estimated to be 70%.   Right ventricular systolic pressure is estimated to be 33 mmHg.         Assessment:  Ms. Shasta Dunn is a 53-year-old  female admitted for opioid overdose as well as unspecified seizure disorder.  She is currently nonverbal and demonstrating many signs of agitation/frustration including restless legs and lack of concentration/attention during interview.      As mentioned in a previous note, Ms. Dunn is currently barely communicative. She is not freezing or holding postures. Her leg shaking appears to be in response to frustration; it is not a functional (or organic) neuro symptom. She isn't oriented. She does not appear to have any true catatonic symtpoms aside from the lack of speech, which leads me to think this is still delirium. If it doesn't resolve as her physical status improves recommend starting ativan at a higher dose and more regularly. Of note, no psych history noted in brief review of notes from  and [facility], aside from opiate dependence. Certain medication wtihdrawals can cause this kind of syndrome- withdrawal from baclofen, strongly anticholinergic medication, and MAOIs as examples; would try to ensure she isn't withdrawing from one of these.     DDX:  -Delirium due to recent drug overdose, seizure activity of unknown onset, and recent multiorgan dysfunction  -Catatonia secondary to medication withdrawal  -Opioid use disorder    Plan:  - As of now, patient will continue to be monitored for signs of improvement/decline.  If patient becomes increasingly communicative psychiatry team will interview the patient again in an attempt to obtain more thorough history.   -Once results of MRI are obtained a further assessment of etiology of patient's current mental status can be made.  If there is no organic cause identified on MRI then consideration for consistent higher dose of ativan should be made  with intention of decreasing catatonic-like symptoms.   -Certain medication wtihdrawals can cause this kind of syndrome- withdrawal from baclofen, strongly anticholinergic medication, and MAOIs as examples; would try to ensure she isn't withdrawing from one of these.       Medical: as noted under Medical Hx and by Medical Team     Disposition:      Patient will remain hospitalized for treatment of her medical illnesses.      Thank you for the consult.

## 2018-04-12 NOTE — THERAPY
Attempted SLP tx however pt currently NPO for procedure at this time. Will reattempt when pt able to participate.

## 2018-04-13 PROBLEM — I69.398 SEIZURE DISORDER AS SEQUELA OF CEREBROVASCULAR ACCIDENT (HCC): Status: ACTIVE | Noted: 2018-04-12

## 2018-04-13 PROBLEM — N17.9 ACUTE KIDNEY INJURY (HCC): Status: RESOLVED | Noted: 2018-04-10 | Resolved: 2018-04-13

## 2018-04-13 PROBLEM — G40.909 SEIZURE DISORDER AS SEQUELA OF CEREBROVASCULAR ACCIDENT (HCC): Status: ACTIVE | Noted: 2018-04-12

## 2018-04-13 LAB
ALBUMIN SERPL BCP-MCNC: 3.3 G/DL (ref 3.2–4.9)
ALBUMIN SERPL BCP-MCNC: 3.6 G/DL (ref 3.2–4.9)
ALBUMIN/GLOB SERPL: 1.4 G/DL
ALBUMIN/GLOB SERPL: 1.6 G/DL
ALP SERPL-CCNC: 36 U/L (ref 30–99)
ALP SERPL-CCNC: 39 U/L (ref 30–99)
ALT SERPL-CCNC: 41 U/L (ref 2–50)
ALT SERPL-CCNC: 43 U/L (ref 2–50)
ANION GAP SERPL CALC-SCNC: 11 MMOL/L (ref 0–11.9)
ANION GAP SERPL CALC-SCNC: 9 MMOL/L (ref 0–11.9)
AST SERPL-CCNC: 20 U/L (ref 12–45)
AST SERPL-CCNC: 26 U/L (ref 12–45)
BASOPHILS # BLD AUTO: 0.2 % (ref 0–1.8)
BASOPHILS # BLD: 0.04 K/UL (ref 0–0.12)
BILIRUB SERPL-MCNC: 1 MG/DL (ref 0.1–1.5)
BILIRUB SERPL-MCNC: 1.1 MG/DL (ref 0.1–1.5)
BNP SERPL-MCNC: 252 PG/ML (ref 0–100)
BUN SERPL-MCNC: 15 MG/DL (ref 8–22)
BUN SERPL-MCNC: 15 MG/DL (ref 8–22)
CALCIUM SERPL-MCNC: 8.1 MG/DL (ref 8.5–10.5)
CALCIUM SERPL-MCNC: 9 MG/DL (ref 8.5–10.5)
CHLORIDE SERPL-SCNC: 112 MMOL/L (ref 96–112)
CHLORIDE SERPL-SCNC: 114 MMOL/L (ref 96–112)
CO2 SERPL-SCNC: 22 MMOL/L (ref 20–33)
CO2 SERPL-SCNC: 22 MMOL/L (ref 20–33)
CREAT SERPL-MCNC: 0.69 MG/DL (ref 0.5–1.4)
CREAT SERPL-MCNC: 0.72 MG/DL (ref 0.5–1.4)
EOSINOPHIL # BLD AUTO: 0 K/UL (ref 0–0.51)
EOSINOPHIL NFR BLD: 0 % (ref 0–6.9)
ERYTHROCYTE [DISTWIDTH] IN BLOOD BY AUTOMATED COUNT: 44.3 FL (ref 35.9–50)
GLOBULIN SER CALC-MCNC: 2.3 G/DL (ref 1.9–3.5)
GLOBULIN SER CALC-MCNC: 2.3 G/DL (ref 1.9–3.5)
GLUCOSE SERPL-MCNC: 135 MG/DL (ref 65–99)
GLUCOSE SERPL-MCNC: 144 MG/DL (ref 65–99)
HCT VFR BLD AUTO: 37.1 % (ref 37–47)
HGB BLD-MCNC: 12.5 G/DL (ref 12–16)
IMM GRANULOCYTES # BLD AUTO: 0.09 K/UL (ref 0–0.11)
IMM GRANULOCYTES NFR BLD AUTO: 0.6 % (ref 0–0.9)
LYMPHOCYTES # BLD AUTO: 1.74 K/UL (ref 1–4.8)
LYMPHOCYTES NFR BLD: 10.7 % (ref 22–41)
MAGNESIUM SERPL-MCNC: 1.9 MG/DL (ref 1.5–2.5)
MCH RBC QN AUTO: 30.2 PG (ref 27–33)
MCHC RBC AUTO-ENTMCNC: 33.7 G/DL (ref 33.6–35)
MCV RBC AUTO: 89.6 FL (ref 81.4–97.8)
MONOCYTES # BLD AUTO: 1.16 K/UL (ref 0–0.85)
MONOCYTES NFR BLD AUTO: 7.1 % (ref 0–13.4)
NEUTROPHILS # BLD AUTO: 13.2 K/UL (ref 2–7.15)
NEUTROPHILS NFR BLD: 81.4 % (ref 44–72)
NRBC # BLD AUTO: 0.02 K/UL
NRBC BLD-RTO: 0.1 /100 WBC
PHOSPHATE SERPL-MCNC: 2.7 MG/DL (ref 2.5–4.5)
PLATELET # BLD AUTO: 123 K/UL (ref 164–446)
PMV BLD AUTO: 10.5 FL (ref 9–12.9)
POTASSIUM SERPL-SCNC: 2.8 MMOL/L (ref 3.6–5.5)
POTASSIUM SERPL-SCNC: 3.4 MMOL/L (ref 3.6–5.5)
PROT SERPL-MCNC: 5.6 G/DL (ref 6–8.2)
PROT SERPL-MCNC: 5.9 G/DL (ref 6–8.2)
RBC # BLD AUTO: 4.14 M/UL (ref 4.2–5.4)
SODIUM SERPL-SCNC: 145 MMOL/L (ref 135–145)
SODIUM SERPL-SCNC: 145 MMOL/L (ref 135–145)
TROPONIN I SERPL-MCNC: 0.04 NG/ML (ref 0–0.04)
WBC # BLD AUTO: 16.2 K/UL (ref 4.8–10.8)

## 2018-04-13 PROCEDURE — A9270 NON-COVERED ITEM OR SERVICE: HCPCS | Performed by: INTERNAL MEDICINE

## 2018-04-13 PROCEDURE — A9270 NON-COVERED ITEM OR SERVICE: HCPCS | Performed by: NURSE PRACTITIONER

## 2018-04-13 PROCEDURE — 83735 ASSAY OF MAGNESIUM: CPT

## 2018-04-13 PROCEDURE — 83880 ASSAY OF NATRIURETIC PEPTIDE: CPT

## 2018-04-13 PROCEDURE — 770020 HCHG ROOM/CARE - TELE (206)

## 2018-04-13 PROCEDURE — 700111 HCHG RX REV CODE 636 W/ 250 OVERRIDE (IP): Performed by: HOSPITALIST

## 2018-04-13 PROCEDURE — 36415 COLL VENOUS BLD VENIPUNCTURE: CPT

## 2018-04-13 PROCEDURE — 302112 WASHCLOTH,PERINEAL CARE: Performed by: INTERNAL MEDICINE

## 2018-04-13 PROCEDURE — 302146: Performed by: INTERNAL MEDICINE

## 2018-04-13 PROCEDURE — 700105 HCHG RX REV CODE 258: Performed by: INTERNAL MEDICINE

## 2018-04-13 PROCEDURE — A9270 NON-COVERED ITEM OR SERVICE: HCPCS | Performed by: HOSPITALIST

## 2018-04-13 PROCEDURE — 93880 EXTRACRANIAL BILAT STUDY: CPT

## 2018-04-13 PROCEDURE — 84484 ASSAY OF TROPONIN QUANT: CPT

## 2018-04-13 PROCEDURE — 302118 SHAMPOO,NO RINSE: Performed by: INTERNAL MEDICINE

## 2018-04-13 PROCEDURE — 84100 ASSAY OF PHOSPHORUS: CPT

## 2018-04-13 PROCEDURE — 92526 ORAL FUNCTION THERAPY: CPT

## 2018-04-13 PROCEDURE — 80053 COMPREHEN METABOLIC PANEL: CPT | Mod: 91

## 2018-04-13 PROCEDURE — 700102 HCHG RX REV CODE 250 W/ 637 OVERRIDE(OP): Performed by: HOSPITALIST

## 2018-04-13 PROCEDURE — 85025 COMPLETE CBC W/AUTO DIFF WBC: CPT

## 2018-04-13 PROCEDURE — 700111 HCHG RX REV CODE 636 W/ 250 OVERRIDE (IP): Performed by: INTERNAL MEDICINE

## 2018-04-13 PROCEDURE — 302255 BARRIER CREAM MOISTURE BAZA PROTECT (ZINC) 5OZ: Performed by: INTERNAL MEDICINE

## 2018-04-13 PROCEDURE — 700102 HCHG RX REV CODE 250 W/ 637 OVERRIDE(OP): Performed by: INTERNAL MEDICINE

## 2018-04-13 PROCEDURE — 700102 HCHG RX REV CODE 250 W/ 637 OVERRIDE(OP): Performed by: NURSE PRACTITIONER

## 2018-04-13 PROCEDURE — 302146: Performed by: PSYCHIATRY & NEUROLOGY

## 2018-04-13 PROCEDURE — 99233 SBSQ HOSP IP/OBS HIGH 50: CPT | Performed by: HOSPITALIST

## 2018-04-13 PROCEDURE — 700105 HCHG RX REV CODE 258: Performed by: HOSPITALIST

## 2018-04-13 RX ORDER — MAGNESIUM SULFATE HEPTAHYDRATE 40 MG/ML
2 INJECTION, SOLUTION INTRAVENOUS ONCE
Status: COMPLETED | OUTPATIENT
Start: 2018-04-13 | End: 2018-04-13

## 2018-04-13 RX ORDER — POTASSIUM CHLORIDE 20 MEQ/1
60 TABLET, EXTENDED RELEASE ORAL EVERY 4 HOURS
Status: COMPLETED | OUTPATIENT
Start: 2018-04-13 | End: 2018-04-13

## 2018-04-13 RX ADMIN — MAGNESIUM SULFATE IN WATER 2 G: 40 INJECTION, SOLUTION INTRAVENOUS at 17:34

## 2018-04-13 RX ADMIN — ASPIRIN 81 MG: 81 TABLET, COATED ORAL at 09:37

## 2018-04-13 RX ADMIN — AMPICILLIN SODIUM AND SULBACTAM SODIUM 3 G: 2; 1 INJECTION, POWDER, FOR SOLUTION INTRAMUSCULAR; INTRAVENOUS at 13:32

## 2018-04-13 RX ADMIN — MICONAZOLE NITRATE: 20 CREAM TOPICAL at 13:30

## 2018-04-13 RX ADMIN — POTASSIUM CHLORIDE 60 MEQ: 1500 TABLET, EXTENDED RELEASE ORAL at 09:38

## 2018-04-13 RX ADMIN — HYDROCORTISONE 5 MG: 5 TABLET ORAL at 09:37

## 2018-04-13 RX ADMIN — AMPICILLIN SODIUM AND SULBACTAM SODIUM 3 G: 2; 1 INJECTION, POWDER, FOR SOLUTION INTRAMUSCULAR; INTRAVENOUS at 18:00

## 2018-04-13 RX ADMIN — HEPARIN SODIUM 5000 UNITS: 5000 INJECTION, SOLUTION INTRAVENOUS; SUBCUTANEOUS at 05:55

## 2018-04-13 RX ADMIN — MICONAZOLE NITRATE: 20 CREAM TOPICAL at 09:40

## 2018-04-13 RX ADMIN — AMPICILLIN SODIUM AND SULBACTAM SODIUM 3 G: 2; 1 INJECTION, POWDER, FOR SOLUTION INTRAMUSCULAR; INTRAVENOUS at 05:55

## 2018-04-13 RX ADMIN — AZITHROMYCIN 500 MG: 250 TABLET, FILM COATED ORAL at 09:36

## 2018-04-13 RX ADMIN — POTASSIUM CHLORIDE 60 MEQ: 1500 TABLET, EXTENDED RELEASE ORAL at 13:19

## 2018-04-13 RX ADMIN — LEVETIRACETAM 500 MG: 100 SOLUTION ORAL at 09:38

## 2018-04-13 RX ADMIN — SODIUM CHLORIDE: 900 INJECTION INTRAVENOUS at 13:19

## 2018-04-13 RX ADMIN — HEPARIN SODIUM 5000 UNITS: 5000 INJECTION, SOLUTION INTRAVENOUS; SUBCUTANEOUS at 22:16

## 2018-04-13 RX ADMIN — SODIUM CHLORIDE: 900 INJECTION INTRAVENOUS at 09:57

## 2018-04-13 RX ADMIN — AMPICILLIN SODIUM AND SULBACTAM SODIUM 3 G: 2; 1 INJECTION, POWDER, FOR SOLUTION INTRAMUSCULAR; INTRAVENOUS at 00:32

## 2018-04-13 RX ADMIN — MICONAZOLE NITRATE: 20 CREAM TOPICAL at 22:18

## 2018-04-13 RX ADMIN — MICONAZOLE NITRATE: 20 CREAM TOPICAL at 02:00

## 2018-04-13 RX ADMIN — LEVETIRACETAM 500 MG: 100 SOLUTION ORAL at 22:16

## 2018-04-13 RX ADMIN — HYDROCODONE BITARTRATE AND ACETAMINOPHEN 1 TABLET: 5; 325 TABLET ORAL at 00:51

## 2018-04-13 RX ADMIN — HEPARIN SODIUM 5000 UNITS: 5000 INJECTION, SOLUTION INTRAVENOUS; SUBCUTANEOUS at 13:20

## 2018-04-13 ASSESSMENT — ENCOUNTER SYMPTOMS
DIZZINESS: 0
NECK PAIN: 0
ABDOMINAL PAIN: 0
BLOOD IN STOOL: 0
FOCAL WEAKNESS: 0
SPEECH CHANGE: 1
DIARRHEA: 1
SINUS PAIN: 0
HEADACHES: 0
SEIZURES: 0
NAUSEA: 0
SHORTNESS OF BREATH: 0
SENSORY CHANGE: 0
PALPITATIONS: 0
COUGH: 0

## 2018-04-13 ASSESSMENT — PAIN SCALES - GENERAL: PAINLEVEL_OUTOF10: 0

## 2018-04-13 NOTE — THERAPY
Occupational Therapy Contact Note:    OT stone attempted, pt un appropriate to work with at this time, will attempt again as able.    Maritza Reynolds, OTR/L  [phone]

## 2018-04-13 NOTE — DISCHARGE PLANNING
PMR order received from BRANDI Davis.  Medi-Nasir is shown for her medical provider.  Unfortunately, RIRF is not a benefit of said provider.  Would welcome PT/OT evals once appropriate.  This referral will not be forwarded to Physiatry @ this time.  TCC remains monitoring. I do appreciate the referral.

## 2018-04-13 NOTE — ASSESSMENT & PLAN NOTE
Multifocal ischemic infarcts  Neurology has signed off  Carotid US normal.  Continue aspirin  LACI w bubble study showed PFO.  Patient wishing to treat medically at this time.  Seen by cardiology, they do not feel she is a candidate for loop at this time as she is returning to [place] at discharge, they recommend this be done in [place] so it can be followed up there

## 2018-04-13 NOTE — PROGRESS NOTES
[facility] Hospitalist Progress Note    Date of Service: 2018    Chief Complaint  53 y.o. female admitted 4/10/2018 for AMS secondary to opiate OD. Found to have CVA in addition to campylobacter infection in her stool.    Interval Problem Update  More awake today.  Expressive aphasia noted during our conversation.  Confused.  Still having non-bloody watery diarrhea but episodes are starting to taper off.  Afebrile overnight.  BP 130s-150s.  Not requiring O2.  WBC trending down.  KCl not given yesterday as ordered. K down to 2.8 this morning.  No EKG changes noted on tele.  Denies pain.    Consultants/Specialty  Neurology    Disposition  Rehab order placed. Family wanting to find a place closer to [place], [place] where the patient & her family live.  Still waiting for PT/OT eval but will need post-acute linguistic & swallowing therapy.        Review of Systems   Constitutional: Positive for malaise/fatigue.   HENT: Negative for congestion, nosebleeds and sinus pain.    Respiratory: Negative for cough and shortness of breath.    Cardiovascular: Negative for chest pain, palpitations and leg swelling.   Gastrointestinal: Positive for diarrhea. Negative for abdominal pain, blood in stool and nausea.   Musculoskeletal: Negative for joint pain and neck pain.        Denies pain   Neurological: Positive for speech change. Negative for dizziness, sensory change, focal weakness, seizures and headaches.        Physical Exam  Laboratory/Imaging   Hemodynamics  Temp (24hrs), Av.9 °C (98.5 °F), Min:36.1 °C (97 °F), Max:37.3 °C (99.1 °F)   Temperature: 37.1 °C (98.8 °F)  Pulse  Av.9  Min: 41  Max: 111 Heart Rate (Monitored): 84  Blood Pressure: 145/93, NIBP: 155/90      Respiratory  Respiration: 17, Pulse Oximetry: 95 %  RUL Breath Sounds: Clear, RML Breath Sounds: Clear, RLL Breath Sounds: Clear, JOLYNN Breath Sounds: Clear, LLL Breath Sounds: Clear    Fluids    Intake/Output Summary (Last 24 hours) at 18 1454  Last data  filed at 04/13/18 1315   Gross per 24 hour   Intake              540 ml   Output                0 ml   Net              540 ml     Nutrition  Orders Placed This Encounter   Procedures   • DIET ORDER     Standing Status:   Standing     Number of Occurrences:   1     Order Specific Question:   Diet:     Answer:   Full Liquid [11]     Order Specific Question:   Consistency/Fluid modifications:     Answer:   Nectar Thick [2]     Order Specific Question:   Miscellaneous modifications:     Answer:   SLP - 1:1 Supervision by Nursing [21]     Physical Exam   Constitutional: She is cooperative. No distress.   Thin     HENT:   Head: Normocephalic and atraumatic.   Eyes: Conjunctivae and EOM are normal. Pupils are equal, round, and reactive to light. Right eye exhibits no nystagmus. Left eye exhibits no nystagmus. Right pupil is round and reactive. Left pupil is round and reactive. Pupils are equal.   Neck: Normal range of motion. Neck supple. No JVD present. No edema present.   Cardiovascular: Normal rate, regular rhythm, normal heart sounds and intact distal pulses.  Exam reveals no gallop and no friction rub.    No murmur heard.  SR on tele monitoring     Pulmonary/Chest: Effort normal and breath sounds normal. No stridor. No tachypnea. No respiratory distress. She has no decreased breath sounds. She has no wheezes. She has no rhonchi. She has no rales.   Not on O2   Abdominal: Soft. Normal appearance and bowel sounds are normal. She exhibits no distension and no abdominal bruit. There is no tenderness. There is no rebound and no guarding.   Genitourinary:   Genitourinary Comments: Incontinent of urine & stool.   Musculoskeletal: Normal range of motion. She exhibits no edema.   Neurological: She is alert. She has normal strength. She is disoriented. No sensory deficit. She displays no seizure activity. GCS eye subscore is 4. GCS verbal subscore is 4. GCS motor subscore is 6.   4-5/5 strength in all extremities, no drift  No  facial droop  No slurred speech  Oriented to self only  Tongue midline  Shoulder shrugs equal  Raised eyebrows are symmetric     Skin: Skin is warm and dry. She is not diaphoretic. No pallor.   Reddened & painful buttocks from frequent diarrhea.     Psychiatric: She has a normal mood and affect. Judgment normal. Her speech is delayed. She is slowed. Cognition and memory are impaired. She does not express impulsivity. She exhibits abnormal recent memory and abnormal remote memory. She is attentive.   Nursing note and vitals reviewed.    Recent Labs      04/11/18 0720 04/12/18 0235 04/13/18 0357   WBC  13.5*  19.0*  16.2*   RBC  3.72*  4.27  4.14*   HEMOGLOBIN  11.2*  12.8  12.5   HEMATOCRIT  33.6*  37.8  37.1   MCV  90.3  88.5  89.6   MCH  30.1  30.0  30.2   MCHC  33.3*  33.9  33.7   RDW  45.1  43.5  44.3   PLATELETCT  132*  147*  123*   MPV  11.0  10.6  10.5     Recent Labs      04/11/18 0720 04/12/18 0235  04/13/18 0357   SODIUM  143  143  145   POTASSIUM  3.8  3.0*  2.8*   CHLORIDE  111  109  112   CO2  22  22  22   GLUCOSE  99  99  144*   BUN  19  16  15   CREATININE  0.79  0.77  0.69   CALCIUM  8.0*  8.5  8.1*         Recent Labs      04/13/18 0357   BNPBTYPENAT  252*     Recent Labs      04/12/18 0235   TRIGLYCERIDE  103   HDL  57   LDL  54          Assessment/Plan     * Mental status change- (present on admission)   Assessment & Plan    Buprenorphine patch removed PTA. Patient was on heavy doses of narcotics at home for chronic pain.  MRI positive for multiple strokes, bilateral in origin.   Neurology on.            Seizure disorder as sequela of cerebrovascular accident (CMS-HCC)   Assessment & Plan    On BID Keppra.  Neurology on.        Stroke syndrome (CMS-HCC)   Assessment & Plan    Several acute infarcts noted bilaterally on MRI.  Neuro already on.  Echo unremarkable. EF 70%.  Carotid US being done now.  Lipid panel showed LDL of 54.  All other values were also WNL.  On ASA.  No plavix  per Dr. Nolasco.  SR on tele.          Chronic pain syndrome   Assessment & Plan    Currently denies pain. Only required norco once overnight.          Hypokalemia   Assessment & Plan    Secondary to wasting from diarrhea.  Down from 3 to 2.8 overnight.  PO repletion ordered yesterday but was not given due to N/V.  Will give 60 mEq PO q4h x 2 doses today. Nursing to notify if unable to take PO so it can be ordered IV.   Recheck in a.m.        Aspiration pneumonia (CMS-HCC)- (present on admission)   Assessment & Plan    Treating with Unasyn, on day 3 of 5.  [facility]. Will continue to monitor respiratory status closely while inpatient.  No respiratory distress noted, lungs are CTABL.  Not currently requiring supplemental O2.          Campylobacter diarrhea- (present on admission)   Assessment & Plan    Campylobacter on cultures from transferring facility. C-diff negative here on 4/10/18.  Still having frequent bouts of watery diarrhea.  No physical exam findings of enteritis.  Tmax overnight 98.7.  WBC down to 16.2.  Treating with azithro, on day 3 of 5.        Hyperglycemia- (present on admission)   Assessment & Plan    A1C normal at 5.4. No hx of DM.  Will continue to monitor on a.m. labs.            Leukocytosis- (present on admission)   Assessment & Plan    WBC trending down.  Still afebrile.   On unasyn & azithro.  Procalcitonin was normal.  Will recheck tomorrow.          Elevated brain natriuretic peptide (BNP) level- (present on admission)   Assessment & Plan    BNP 8000s on arrival.   TTE showed normal diastolic function & EF 70%.  No evidence of FVO on exam.  Will recheck level today.        Elevated troponin- (present on admission)   Assessment & Plan    No CP or hx of CAD.  Normal echocardiogram.  Will check level today to ensure it is trending downward.        Hypotension- (present on admission)   Assessment & Plan    No hypotension since transfer to neuro.   Florinef discontinued yesterday. BPs overnight were 130s-150s systolic. Will allow for permissive hypertension in light of multiple strokes.          Quality-Core Measures   Reviewed items::  Labs reviewed, Medications reviewed, Radiology images reviewed and EKG reviewed  Bishop catheter::  No Bishop  DVT prophylaxis pharmacological::  Heparin  : JESSICA hose bilaterally.  Ulcer Prophylaxis::  Not indicated  Assessed for rehabilitation services:  Patient was assess for and/or received rehabilitation services during this hospitalization

## 2018-04-13 NOTE — CARE PLAN
Problem: Safety  Goal: Will remain free from falls    Intervention: Assess risk factors for falls  Bed alarm, non skid socks, mobility sign, in place and bed rails x2 up. Sitter at bedside.      Problem: Skin Integrity  Goal: Risk for impaired skin integrity will decrease    Intervention: Assess risk factors for impaired skin integrity and/or pressure ulcers  Frequent laverne care and barrier paste. Turns self often.

## 2018-04-13 NOTE — THERAPY
"Speech Language Therapy dysphagia treatment completed.   Functional Status:  Patient seen this date for f/u dysphagia tx. She was awake and alert. Intermittently oriented to person, place, year (aka, she answered incorrectly once, but correctly when asked a second time. She was was restless throughout the evaluation. PO trials of nectar thick liquids and pureed consistencies. Patient able to self-feed sips of nectars. No overt s/sx of aspiration or difficulty. With trials of purees, she required feeding by SLP. With many of the trials she had an exaggerated response to flavors, grimacing. She was unable to clarify why the taste bothered her, but when asked, \"does it taste funny?\", she nodded yes. Coughing with trial of thick puree x1. No coughing with thin puree (applesauce).    Recommendations: Continue with Nectar Thick Full Liquid diet. Strict 1:1 feeding. Wait to feed patient if she is lethargic. Hold PO with any difficulty.    Plan of Care: Will benefit from Speech Therapy 5 times per week    Post-Acute Therapy: Discharge to a transitional care facility for continued skilled therapy services.    See \"Rehab Therapy-Acute\" Patient Summary Report for complete documentation.     "

## 2018-04-13 NOTE — PROGRESS NOTES
Pt. Alert but does not answer orientation questions. Pt. follows simple commands intermittently. Pt. had two episode of systemic shaking over night but pt. able to hold conversation and follow commends during these episodes. Episodes last around 30 seconds each time. Pt's. Sacral region raw and excoriated. Nystatin cream and barrier wipes in use. Pt. also had 3 episodes of diarrhea overnight. Sitter in place. Call light in reach.

## 2018-04-13 NOTE — PROGRESS NOTES
NEUROLOGY PROGRESS NOTE      Background:  52 y.o. female was admitted on 4/10/2018  6:43 AM for Overdose  Overdose.  She is being followed by Neurology for possible seizure.    SUBJECTIVE: She appears more alert and more communicative, although still has significant expressive aphasia.    NEUROLOGICAL EXAM:  She is awake, alert, able to track and say few words, but mostly   she is nonverbal.   naming and repetition is intact.   She is able to follow command.  Her pupils are equal and   reactive.  Extraocular movements are full.  Visual fields are full to   confrontation.  Hearing is intact to finger rub bilaterally.  Tongue is   midline and protrudes symmetrically.  Palate elevates symmetrically.  Shoulder   shrugs are normal.  Motor examination revealed normal strength to direct   testing of both upper and lower extremity, proximal and distal.  Sensation   intact to light touch, temperature, and pinprick.  Coordination is intact to   finger-to-nose testing.  She was not able to perform heel-to-shin testing.    Deep tendon reflexes are 1+ and equal.  Plantar reflexes are downgoing    OBJECTIVE:     NEUROIMAGING:    Brain MRI:  1.  Multifocal areas of acute infarction with the largest area of infarction located superiorly in the left frontal lobe extending to the cortex. Much smaller areas of acute infarction are noted in the left posterior occipital lobe, right   parietal-occipital cortex, and right hemicerebellum.    2.  Mild periventricular and juxtacortical white matter changes consistent with chronic microvascular ischemic gliosis.    EEG:   EEG 04/10/18 2:49 PM  This scalp EEG is consistent with                  Focal cortical dysfunction and focal irritability over the left                 Probable focal seizures from the left      Advise anti-seizure medication     There are no definite electrographic seizures in this record though       MEDICATIONS:  Current Facility-Administered Medications   Medication  "Dose   • potassium chloride SA (Kdur) tablet 60 mEq  60 mEq   • HYDROcodone-acetaminophen (NORCO) 5-325 MG per tablet 1 Tab  1 Tab   • Pharmacy Consult Request     • LORazepam (ATIVAN) injection 0.5 mg  0.5 mg   • azithromycin (ZITHROMAX) tablet 500 mg  500 mg   • ampicillin/sulbactam (UNASYN) 3 g in  mL IVPB  3 g   • miconazole 2%-zinc oxide (TOM) topical cream     • acetaminophen (TYLENOL) tablet 650 mg  650 mg   • labetalol (NORMODYNE,TRANDATE) injection 5 mg  5 mg   • aspirin EC (ECOTRIN) tablet 81 mg  81 mg   • NS infusion     • heparin injection 5,000 Units  5,000 Units   • hydrocortisone (CORTEF) tablet 5 mg  5 mg   • Respiratory Care per Protocol     • levETIRAcetam (KEPPRA) 100 MG/ML solution 500 mg  500 mg       Blood pressure 145/93, pulse 70, temperature 37.1 °C (98.8 °F), resp. rate 17, height 1.676 m (5' 6\"), weight 58 kg (127 lb 13.9 oz), SpO2 95 %.    Recent Labs      04/10/18   1550  04/13/18   0357   TROPONINI  0.25*  0.04   BNPBTYPENAT   --   252*     Recent Labs      04/11/18   0720  04/12/18   0235  04/13/18   0357   WBC  13.5*  19.0*  16.2*   RBC  3.72*  4.27  4.14*   HEMOGLOBIN  11.2*  12.8  12.5   HEMATOCRIT  33.6*  37.8  37.1   MCV  90.3  88.5  89.6   MCH  30.1  30.0  30.2   MCHC  33.3*  33.9  33.7   RDW  45.1  43.5  44.3   PLATELETCT  132*  147*  123*   MPV  11.0  10.6  10.5     Recent Labs      04/11/18   0720  04/12/18   0235  04/13/18   0357   SODIUM  143  143  145   POTASSIUM  3.8  3.0*  2.8*   CHLORIDE  111  109  112   CO2  22  22  22   GLUCOSE  99  99  144*   BUN  19  16  15       Results for orders placed or performed during the hospital encounter of 04/10/18   ECHOCARDIOGRAM COMP W/O CONT   Result Value Ref Range    Eject.Frac. MOD BP 73.64     Eject.Frac. MOD 4C 78.09     Eject.Frac. MOD 2C 67.55     Left Ventrical Ejection Fraction 70         ASSESSMENT AND PLAN:   A 52-year-old female who was found unresponsive,   incontinent of urine and bowel with evidence of narcotic " overdose, which has   improved after receiving Narcan.   Her EEG revealed focal seizure from left.   She was started on Keppra 500 mg twice a day.   She remains nonverbal and her brain MRI revealed multifocal areas of acute infarction with the largest area of infarction located superiorly in the left frontal lobe extending to the cortex. Much smaller areas of acute infarction are noted in the left posterior occipital lobe, right parietal-occipital cortex, and right hemicerebellum.  Her echo noted as above, with ejection fraction of 70%. We'll obtain carotid ultrasound. Will check profile. She will continue with aspirin. She will continue with physical therapy and speech therapy.  She is gradually improving. Needs extensive rehabilitation for her speech.  Neurology will follow as needed, please call us if there is any question.

## 2018-04-13 NOTE — THERAPY
Physical Therapy Contact Note    PT consult received; pt is currently unable to actively participate in evaluation; having ongoing diarrhea as well; will continue to monitor;     Lima Choi, PT, DPT Pager: [phone]

## 2018-04-13 NOTE — CARE PLAN
Problem: Communication  Goal: The ability to communicate needs accurately and effectively will improve  Outcome: PROGRESSING AS EXPECTED      Problem: Safety  Goal: Will remain free from falls  Outcome: PROGRESSING AS EXPECTED  1:1 sitter at bedside    Problem: Pain Management  Goal: Pain level will decrease to patient's comfort goal  Outcome: PROGRESSING AS EXPECTED

## 2018-04-13 NOTE — THERAPY
"SLP Contact Note:  SLP attempted to complete cog-linguistic evaluation. Patient lethargic with limited participation. Oriented to person. SLP asked yes/no questions, with patient answering 8/12 questions correctly by nodding yes or no. When asking questions, she responded with the following words, \"no\", \"yeah\", \"[place], and \"[place]\". She was unable to identify 3/3 pictures from an array. With modeling, she was able to point to 2/2 pictures correctly in imitation. She then rolled over and closed her eyes. Will attempt again when patient is more alert and able to participate.    Patient noted to be on a regular diet with nectar thick liquids. SLP recommendation from bedside swallow on 04/11/18 study was [place] Thick Full Liquid diet. RN changed diet to NTFL. SLP to follow up at lunch to ensure diet tolerance.    "

## 2018-04-13 NOTE — CARE PLAN
Problem: Safety  Goal: Will remain free from falls  Outcome: PROGRESSING AS EXPECTED  1:1 safety sitter at the patients bedside and assisting pt. with needs.     Problem: Bowel/Gastric:  Goal: Normal bowel function is maintained or improved  Outcome: PROGRESSING SLOWER THAN EXPECTED  3 episodes of loose stools overnight. Stool softners held.

## 2018-04-14 ENCOUNTER — APPOINTMENT (OUTPATIENT)
Dept: RADIOLOGY | Facility: MEDICAL CENTER | Age: 53
DRG: 917 | End: 2018-04-14
Attending: NURSE PRACTITIONER
Payer: COMMERCIAL

## 2018-04-14 PROCEDURE — 700102 HCHG RX REV CODE 250 W/ 637 OVERRIDE(OP): Performed by: NURSE PRACTITIONER

## 2018-04-14 PROCEDURE — 700117 HCHG RX CONTRAST REV CODE 255: Performed by: HOSPITALIST

## 2018-04-14 PROCEDURE — 770020 HCHG ROOM/CARE - TELE (206)

## 2018-04-14 PROCEDURE — 700105 HCHG RX REV CODE 258: Performed by: INTERNAL MEDICINE

## 2018-04-14 PROCEDURE — 99232 SBSQ HOSP IP/OBS MODERATE 35: CPT | Performed by: HOSPITALIST

## 2018-04-14 PROCEDURE — 700102 HCHG RX REV CODE 250 W/ 637 OVERRIDE(OP): Performed by: INTERNAL MEDICINE

## 2018-04-14 PROCEDURE — 71260 CT THORAX DX C+: CPT

## 2018-04-14 PROCEDURE — A9270 NON-COVERED ITEM OR SERVICE: HCPCS | Performed by: NURSE PRACTITIONER

## 2018-04-14 PROCEDURE — 700102 HCHG RX REV CODE 250 W/ 637 OVERRIDE(OP): Performed by: HOSPITALIST

## 2018-04-14 PROCEDURE — 700111 HCHG RX REV CODE 636 W/ 250 OVERRIDE (IP): Performed by: SPECIALIST

## 2018-04-14 PROCEDURE — 700105 HCHG RX REV CODE 258: Performed by: HOSPITALIST

## 2018-04-14 PROCEDURE — 700111 HCHG RX REV CODE 636 W/ 250 OVERRIDE (IP): Performed by: INTERNAL MEDICINE

## 2018-04-14 PROCEDURE — A9270 NON-COVERED ITEM OR SERVICE: HCPCS | Performed by: HOSPITALIST

## 2018-04-14 PROCEDURE — 700111 HCHG RX REV CODE 636 W/ 250 OVERRIDE (IP): Performed by: HOSPITALIST

## 2018-04-14 PROCEDURE — 700105 HCHG RX REV CODE 258: Performed by: SPECIALIST

## 2018-04-14 PROCEDURE — A9270 NON-COVERED ITEM OR SERVICE: HCPCS | Performed by: INTERNAL MEDICINE

## 2018-04-14 RX ORDER — LOPERAMIDE HYDROCHLORIDE 2 MG/1
2 CAPSULE ORAL 4 TIMES DAILY PRN
Status: DISCONTINUED | OUTPATIENT
Start: 2018-04-14 | End: 2018-04-26

## 2018-04-14 RX ORDER — POTASSIUM CHLORIDE 20 MEQ/1
20 TABLET, EXTENDED RELEASE ORAL DAILY
Status: COMPLETED | OUTPATIENT
Start: 2018-04-14 | End: 2018-04-15

## 2018-04-14 RX ORDER — HYDROCODONE BITARTRATE AND ACETAMINOPHEN 5; 325 MG/1; MG/1
1 TABLET ORAL EVERY 6 HOURS
Status: DISCONTINUED | OUTPATIENT
Start: 2018-04-14 | End: 2018-04-15

## 2018-04-14 RX ADMIN — IOHEXOL 75 ML: 350 INJECTION, SOLUTION INTRAVENOUS at 19:38

## 2018-04-14 RX ADMIN — HYDROCODONE BITARTRATE AND ACETAMINOPHEN 1 TABLET: 5; 325 TABLET ORAL at 18:25

## 2018-04-14 RX ADMIN — MICONAZOLE NITRATE: 20 CREAM TOPICAL at 21:54

## 2018-04-14 RX ADMIN — MICONAZOLE NITRATE: 20 CREAM TOPICAL at 08:39

## 2018-04-14 RX ADMIN — HYDROCORTISONE 5 MG: 5 TABLET ORAL at 08:32

## 2018-04-14 RX ADMIN — LORAZEPAM 0.5 MG: 2 INJECTION INTRAMUSCULAR; INTRAVENOUS at 14:19

## 2018-04-14 RX ADMIN — LEVETIRACETAM 500 MG: 100 SOLUTION ORAL at 08:32

## 2018-04-14 RX ADMIN — AZITHROMYCIN 500 MG: 250 TABLET, FILM COATED ORAL at 08:32

## 2018-04-14 RX ADMIN — SODIUM CHLORIDE: 900 INJECTION INTRAVENOUS at 12:02

## 2018-04-14 RX ADMIN — AMPICILLIN SODIUM AND SULBACTAM SODIUM 3 G: 2; 1 INJECTION, POWDER, FOR SOLUTION INTRAMUSCULAR; INTRAVENOUS at 01:20

## 2018-04-14 RX ADMIN — ASPIRIN 81 MG: 81 TABLET, COATED ORAL at 08:32

## 2018-04-14 RX ADMIN — HEPARIN SODIUM 5000 UNITS: 5000 INJECTION, SOLUTION INTRAVENOUS; SUBCUTANEOUS at 06:09

## 2018-04-14 RX ADMIN — HYDROCODONE BITARTRATE AND ACETAMINOPHEN 1 TABLET: 5; 325 TABLET ORAL at 23:28

## 2018-04-14 RX ADMIN — SODIUM CHLORIDE: 900 INJECTION INTRAVENOUS at 01:19

## 2018-04-14 RX ADMIN — MICONAZOLE NITRATE: 20 CREAM TOPICAL at 13:57

## 2018-04-14 RX ADMIN — AMPICILLIN SODIUM AND SULBACTAM SODIUM 3 G: 2; 1 INJECTION, POWDER, FOR SOLUTION INTRAMUSCULAR; INTRAVENOUS at 06:31

## 2018-04-14 RX ADMIN — POTASSIUM CHLORIDE 20 MEQ: 1500 TABLET, EXTENDED RELEASE ORAL at 11:23

## 2018-04-14 RX ADMIN — HEPARIN SODIUM 5000 UNITS: 5000 INJECTION, SOLUTION INTRAVENOUS; SUBCUTANEOUS at 23:28

## 2018-04-14 RX ADMIN — MICONAZOLE NITRATE: 20 CREAM TOPICAL at 01:20

## 2018-04-14 RX ADMIN — AMPICILLIN SODIUM AND SULBACTAM SODIUM 3 G: 2; 1 INJECTION, POWDER, FOR SOLUTION INTRAMUSCULAR; INTRAVENOUS at 12:02

## 2018-04-14 RX ADMIN — SODIUM CHLORIDE 1000 MG: 9 INJECTION, SOLUTION INTRAVENOUS at 18:25

## 2018-04-14 RX ADMIN — AMPICILLIN SODIUM AND SULBACTAM SODIUM 3 G: 2; 1 INJECTION, POWDER, FOR SOLUTION INTRAMUSCULAR; INTRAVENOUS at 23:28

## 2018-04-14 RX ADMIN — AMPICILLIN SODIUM AND SULBACTAM SODIUM 3 G: 2; 1 INJECTION, POWDER, FOR SOLUTION INTRAMUSCULAR; INTRAVENOUS at 18:28

## 2018-04-14 RX ADMIN — HEPARIN SODIUM 5000 UNITS: 5000 INJECTION, SOLUTION INTRAVENOUS; SUBCUTANEOUS at 13:56

## 2018-04-14 RX ADMIN — HYDROCODONE BITARTRATE AND ACETAMINOPHEN 1 TABLET: 5; 325 TABLET ORAL at 11:22

## 2018-04-14 ASSESSMENT — ENCOUNTER SYMPTOMS
SPEECH CHANGE: 1
HEADACHES: 0
SINUS PAIN: 0
PALPITATIONS: 0
NAUSEA: 0
DIZZINESS: 0
SENSORY CHANGE: 0
DIARRHEA: 1
NECK PAIN: 0
FOCAL WEAKNESS: 0
BLOOD IN STOOL: 0
ABDOMINAL PAIN: 0
SHORTNESS OF BREATH: 0
COUGH: 0
SEIZURES: 0

## 2018-04-14 ASSESSMENT — COPD QUESTIONNAIRES
COPD SCREENING SCORE: 1
HAVE YOU SMOKED AT LEAST 100 CIGARETTES IN YOUR ENTIRE LIFE: NO/DON'T KNOW
DURING THE PAST 4 WEEKS HOW MUCH DID YOU FEEL SHORT OF BREATH: NONE/LITTLE OF THE TIME
DO YOU EVER COUGH UP ANY MUCUS OR PHLEGM?: NO/ONLY WITH OCCASIONAL COLDS OR INFECTIONS

## 2018-04-14 ASSESSMENT — PAIN SCALES - GENERAL
PAINLEVEL_OUTOF10: 0

## 2018-04-14 ASSESSMENT — PATIENT HEALTH QUESTIONNAIRE - PHQ9
SUM OF ALL RESPONSES TO PHQ9 QUESTIONS 1 AND 2: 0
2. FEELING DOWN, DEPRESSED, IRRITABLE, OR HOPELESS: NOT AT ALL
1. LITTLE INTEREST OR PLEASURE IN DOING THINGS: NOT AT ALL

## 2018-04-14 NOTE — PROGRESS NOTES
Pt alert, unable to answer orientation questions appropriately, at times able to respond to yes/no questions. No c/o pain, N/V, N/T. Pt had two episodes of full body shaking over night, pt able to follow simple commands and answer yes/no to questions during episodes, pt screaming/grunting but when asked about pain, pt denies pain. Episodes last around 30 seconds to 1 minute. Pt incontinent of bowel and bladder with frequent diarrhea, sacral region raw and excoriated. Nystatin cream and barrier wipes in use. Sitting in place, bed alarm on, call light and personal belongings within reach, hourly rounding in place.

## 2018-04-14 NOTE — PROGRESS NOTES
Monitor summary: SR 58-89, down to 45, TX 0.12, QRS 0.08, QT 0.32, with PVCs per strip from monitor room.

## 2018-04-14 NOTE — CARE PLAN
Problem: Bowel/Gastric:  Goal: Normal bowel function is maintained or improved  Outcome: PROGRESSING SLOWER THAN EXPECTED      Problem: Knowledge Deficit  Goal: Knowledge of disease process/condition, treatment plan, diagnostic tests, and medications will improve  Outcome: PROGRESSING SLOWER THAN EXPECTED

## 2018-04-14 NOTE — PROGRESS NOTES
[facility] Hospitalist Progress Note    Date of Service: 2018    Chief Complaint  53 y.o. female admitted 4/10/2018 for AMS presumably due to opiate OD, as she responded to narcan. Found to have CVA in addition to campylobacter infection in her stool. She has an abnormal EEG.    Interval Problem Update  Continued expressive dysphasia. Less diarrhea. No HA. Labs improving. Tremulous at times despite AED. No postictal component and following during attacks. Possibly opioid withdrawal. CVA etiology still cryptogenic. Reports transdermal estradiol use. Rare intermittent arrhythmias as OP, never evaluated. She has had some incontinence of urine and stool.    Consultants/Specialty  Neurology    Disposition  Rehab order placed. Family wanting to find a place closer to [place], [place] where the patient & her family live.  Still waiting for PT/OT eval but will need post-acute linguistic & swallowing therapy.        Review of Systems   Constitutional: Positive for malaise/fatigue.   HENT: Negative for congestion, nosebleeds and sinus pain.    Respiratory: Negative for cough and shortness of breath.    Cardiovascular: Negative for chest pain, palpitations and leg swelling.   Gastrointestinal: Positive for diarrhea. Negative for abdominal pain, blood in stool and nausea.   Musculoskeletal: Negative for joint pain and neck pain.        Denies pain   Neurological: Positive for speech change. Negative for dizziness, sensory change, focal weakness, seizures and headaches.        Physical Exam  Laboratory/Imaging   Hemodynamics  Temp (24hrs), Av.1 °C (98.8 °F), Min:37 °C (98.6 °F), Max:37.3 °C (99.2 °F)   Temperature: 37.1 °C (98.7 °F)  Pulse  Av.7  Min: 41  Max: 111    Blood Pressure: 122/92      Respiratory  Respiration: 16, Pulse Oximetry: 93 %  RUL Breath Sounds: Clear, RML Breath Sounds: Clear, RLL Breath Sounds: Clear, JOLYNN Breath Sounds: Clear, LLL Breath Sounds: Clear    Fluids    Intake/Output Summary (Last 24 hours)  at 04/14/18 1323  Last data filed at 04/13/18 1710   Gross per 24 hour   Intake              240 ml   Output                0 ml   Net              240 ml     Nutrition  Orders Placed This Encounter   Procedures   • DIET ORDER     Standing Status:   Standing     Number of Occurrences:   1     Order Specific Question:   Diet:     Answer:   Full Liquid [11]     Order Specific Question:   Consistency/Fluid modifications:     Answer:   Nectar Thick [2]     Order Specific Question:   Miscellaneous modifications:     Answer:   SLP - 1:1 Supervision by Nursing [21]     Physical Exam   Constitutional: She appears well-developed and well-nourished. She is cooperative. No distress.   Thin     HENT:   Head: Normocephalic and atraumatic.   Eyes: Conjunctivae and EOM are normal. Pupils are equal, round, and reactive to light. Right eye exhibits no nystagmus. Left eye exhibits no nystagmus. Right pupil is round and reactive. Left pupil is round and reactive. Pupils are equal.   Neck: Neck supple. No JVD present. No edema present.   Cardiovascular: Normal rate, regular rhythm, normal heart sounds and intact distal pulses.  Exam reveals no gallop and no friction rub.    No murmur heard.  SR on tele monitoring     Pulmonary/Chest: Effort normal and breath sounds normal. No stridor. No tachypnea. No respiratory distress. She has no decreased breath sounds. She has no wheezes. She has no rhonchi. She has no rales.   Not on O2   Abdominal: Soft. Normal appearance and bowel sounds are normal. She exhibits no abdominal bruit.   Musculoskeletal: Normal range of motion. She exhibits no edema.   Neurological: She is alert. She has normal strength. She is disoriented. No cranial nerve deficit or sensory deficit. She displays no seizure activity. GCS eye subscore is 4. GCS verbal subscore is 4. GCS motor subscore is 6.   Oriented to self, expressive dysphasia     Skin: Skin is warm and dry. She is not diaphoretic. No pallor.   Psychiatric:  She has a normal mood and affect. Judgment normal. Her speech is delayed. She is slowed. Cognition and memory are impaired. She does not express impulsivity. She exhibits abnormal recent memory and abnormal remote memory. She is attentive.   Nursing note and vitals reviewed.    Recent Labs      04/12/18 0235  04/13/18   0357   WBC  19.0*  16.2*   RBC  4.27  4.14*   HEMOGLOBIN  12.8  12.5   HEMATOCRIT  37.8  37.1   MCV  88.5  89.6   MCH  30.0  30.2   MCHC  33.9  33.7   RDW  43.5  44.3   PLATELETCT  147*  123*   MPV  10.6  10.5     Recent Labs      04/12/18   0235  04/13/18 0357  04/13/18   2233   SODIUM  143  145  145   POTASSIUM  3.0*  2.8*  3.4*   CHLORIDE  109  112  114*   CO2  22  22  22   GLUCOSE  99  144*  135*   BUN  16  15  15   CREATININE  0.77  0.69  0.72   CALCIUM  8.5  8.1*  9.0         Recent Labs      04/13/18 0357   BNPBTYPENAT  252*     Recent Labs      04/12/18 0235   TRIGLYCERIDE  103   HDL  57   LDL  54          Assessment/Plan     * Stroke syndrome (CMS-HCC)   Assessment & Plan    Multifocal infarctions, largest in the left frontal lobe, left posterior occipital lobe, right parietal-occipital cortex, and right hemicerebellum, respectively  Neuro following.  Echo unremarkable. EF 70%. No bubble study. Discussed with Neurology. Given unclear etiology, LACI w bubble study to eval MAUREEN/possible PFO.  Carotid US normal.  Lipid panel showed LDL of 54 so will hold off on statin.  All other values were also WNL.  On ASA.  No plavix per neuro.  SR on tele.  Will plan for eventual ILR placement.          Drug overdose - unintentional- (present on admission)   Assessment & Plan    Buprenorphine patch removed PTA.   Patient was on heavy doses of narcotics at home for chronic pain vs fibromyalgia  Denies suicidal ideation or history            Seizure disorder as sequela of cerebrovascular accident (CMS-HCC)- (present on admission)   Assessment & Plan    On BID Keppra.  Neurology on.        Chronic pain  syndrome- (present on admission)   Assessment & Plan    Currently denies pain. Only required norco once overnight.          Hypokalemia   Assessment & Plan    Secondary to wasting from diarrhea.   Tolerating oral replacement        Aspiration pneumonia (CMS-HCC)- (present on admission)   Assessment & Plan    Treating with Unasyn, on day 3 of 5.  Repeat cxr in appox 4-6 weeks to assess for resolution. Will continue to monitor respiratory status closely while inpatient.  No respiratory distress noted, lungs are CTABL.  Procalcitonin neg. Not currently requiring supplemental O2.  Will r/o possible mass w CT given cryptogenic CVA          Campylobacter diarrhea- (present on admission)   Assessment & Plan    Campylobacter on cultures from transferring facility. C-diff negative here on 4/10/18.  Still having frequent bouts of watery diarrhea.  No physical exam findings of enteritis.  Tmax overnight 98.7.  WBC down to 16.2.  Treating with azithro, on day 3 of 5.  PRN loperamide        Hyperglycemia- (present on admission)   Assessment & Plan    A1C normal at 5.4. No hx of DM.  Will continue to monitor on a.m. labs.            Leukocytosis- (present on admission)   Assessment & Plan    WBC trending down.  Still afebrile.   On unasyn & azithro.  Procalcitonin was normal.  Will recheck tomorrow.          Elevated brain natriuretic peptide (BNP) level- (present on admission)   Assessment & Plan    BNP 8000s on arrival.   TTE showed normal diastolic function & EF 70%.  No evidence of FVO on exam.  Will recheck level today.        Elevated troponin- (present on admission)   Assessment & Plan    No CP or hx of CAD.  Normal echocardiogram.  Repeat trending down.        Hypotension- (present on admission)   Assessment & Plan    No hypotension since transfer to neuro.  Florinef discontinued yesterday. BPs overnight were 130s-150s systolic. Will allow for permissive hypertension in light of multiple strokes.          Quality-Core  Measures   Reviewed items::  Labs reviewed, Medications reviewed, Radiology images reviewed and EKG reviewed  Bishop catheter::  No Bishop  DVT prophylaxis pharmacological::  Heparin  : JESSICA hose bilaterally.  Ulcer Prophylaxis::  Not indicated  Assessed for rehabilitation services:  Patient was assess for and/or received rehabilitation services during this hospitalization

## 2018-04-14 NOTE — PROGRESS NOTES
Patient had an episode of shaking and grunting that lasted from 1039 until 1040:30 on 4/14/28. During the episode she was able to follow commands after repeatedly asking. There was no post ictal state. Towards the end of episode she was able to follow my finger with her gaze and answer simple questions. Called Ananth Perez and informed him of this instance. Asked about the possibility of it being withdrawal related instead of seizure. Order received to give Norco and he will order scheduled doses Q6.

## 2018-04-15 PROBLEM — G90.9 AUTONOMIC NEUROPATHY: Status: ACTIVE | Noted: 2018-04-10

## 2018-04-15 LAB
ANION GAP SERPL CALC-SCNC: 8 MMOL/L (ref 0–11.9)
BUN SERPL-MCNC: 21 MG/DL (ref 8–22)
CALCIUM SERPL-MCNC: 8.6 MG/DL (ref 8.5–10.5)
CHLORIDE SERPL-SCNC: 114 MMOL/L (ref 96–112)
CO2 SERPL-SCNC: 25 MMOL/L (ref 20–33)
CREAT SERPL-MCNC: 0.67 MG/DL (ref 0.5–1.4)
ERYTHROCYTE [DISTWIDTH] IN BLOOD BY AUTOMATED COUNT: 46.5 FL (ref 35.9–50)
ERYTHROCYTE [SEDIMENTATION RATE] IN BLOOD BY WESTERGREN METHOD: 8 MM/HOUR (ref 0–30)
GLUCOSE SERPL-MCNC: 117 MG/DL (ref 65–99)
HCT VFR BLD AUTO: 36.1 % (ref 37–47)
HGB BLD-MCNC: 12.2 G/DL (ref 12–16)
MCH RBC QN AUTO: 30.1 PG (ref 27–33)
MCHC RBC AUTO-ENTMCNC: 33.8 G/DL (ref 33.6–35)
MCV RBC AUTO: 89.1 FL (ref 81.4–97.8)
PLATELET # BLD AUTO: 126 K/UL (ref 164–446)
PMV BLD AUTO: 10.9 FL (ref 9–12.9)
POTASSIUM SERPL-SCNC: 3.1 MMOL/L (ref 3.6–5.5)
RBC # BLD AUTO: 4.05 M/UL (ref 4.2–5.4)
SODIUM SERPL-SCNC: 147 MMOL/L (ref 135–145)
WBC # BLD AUTO: 20.5 K/UL (ref 4.8–10.8)

## 2018-04-15 PROCEDURE — 700111 HCHG RX REV CODE 636 W/ 250 OVERRIDE (IP): Performed by: SPECIALIST

## 2018-04-15 PROCEDURE — A9270 NON-COVERED ITEM OR SERVICE: HCPCS | Performed by: HOSPITALIST

## 2018-04-15 PROCEDURE — 85027 COMPLETE CBC AUTOMATED: CPT

## 2018-04-15 PROCEDURE — 700102 HCHG RX REV CODE 250 W/ 637 OVERRIDE(OP): Performed by: NURSE PRACTITIONER

## 2018-04-15 PROCEDURE — 81241 F5 GENE: CPT

## 2018-04-15 PROCEDURE — 700111 HCHG RX REV CODE 636 W/ 250 OVERRIDE (IP): Performed by: INTERNAL MEDICINE

## 2018-04-15 PROCEDURE — 99233 SBSQ HOSP IP/OBS HIGH 50: CPT | Performed by: HOSPITALIST

## 2018-04-15 PROCEDURE — 85303 CLOT INHIBIT PROT C ACTIVITY: CPT

## 2018-04-15 PROCEDURE — 700102 HCHG RX REV CODE 250 W/ 637 OVERRIDE(OP): Performed by: HOSPITALIST

## 2018-04-15 PROCEDURE — 700105 HCHG RX REV CODE 258: Performed by: SPECIALIST

## 2018-04-15 PROCEDURE — 36415 COLL VENOUS BLD VENIPUNCTURE: CPT

## 2018-04-15 PROCEDURE — 700105 HCHG RX REV CODE 258: Performed by: HOSPITALIST

## 2018-04-15 PROCEDURE — 86235 NUCLEAR ANTIGEN ANTIBODY: CPT | Mod: 91

## 2018-04-15 PROCEDURE — 770006 HCHG ROOM/CARE - MED/SURG/GYN SEMI*

## 2018-04-15 PROCEDURE — 700105 HCHG RX REV CODE 258: Performed by: NURSE PRACTITIONER

## 2018-04-15 PROCEDURE — A9270 NON-COVERED ITEM OR SERVICE: HCPCS | Performed by: NURSE PRACTITIONER

## 2018-04-15 PROCEDURE — 80048 BASIC METABOLIC PNL TOTAL CA: CPT

## 2018-04-15 PROCEDURE — G8988 SELF CARE GOAL STATUS: HCPCS | Mod: CI

## 2018-04-15 PROCEDURE — A9270 NON-COVERED ITEM OR SERVICE: HCPCS | Performed by: INTERNAL MEDICINE

## 2018-04-15 PROCEDURE — 86147 CARDIOLIPIN ANTIBODY EA IG: CPT | Mod: 91

## 2018-04-15 PROCEDURE — 700102 HCHG RX REV CODE 250 W/ 637 OVERRIDE(OP): Performed by: INTERNAL MEDICINE

## 2018-04-15 PROCEDURE — 85652 RBC SED RATE AUTOMATED: CPT

## 2018-04-15 PROCEDURE — G8987 SELF CARE CURRENT STATUS: HCPCS | Mod: CL

## 2018-04-15 PROCEDURE — 86038 ANTINUCLEAR ANTIBODIES: CPT

## 2018-04-15 PROCEDURE — 85306 CLOT INHIBIT PROT S FREE: CPT

## 2018-04-15 PROCEDURE — 97165 OT EVAL LOW COMPLEX 30 MIN: CPT

## 2018-04-15 PROCEDURE — 86225 DNA ANTIBODY NATIVE: CPT

## 2018-04-15 PROCEDURE — 700111 HCHG RX REV CODE 636 W/ 250 OVERRIDE (IP): Performed by: HOSPITALIST

## 2018-04-15 RX ORDER — POTASSIUM CHLORIDE 20 MEQ/1
40 TABLET, EXTENDED RELEASE ORAL 2 TIMES DAILY
Status: DISCONTINUED | OUTPATIENT
Start: 2018-04-15 | End: 2018-04-15

## 2018-04-15 RX ORDER — POTASSIUM CHLORIDE 20 MEQ/1
60 TABLET, EXTENDED RELEASE ORAL EVERY 6 HOURS
Status: DISCONTINUED | OUTPATIENT
Start: 2018-04-15 | End: 2018-04-15

## 2018-04-15 RX ORDER — POTASSIUM CHLORIDE 20 MEQ/1
20 TABLET, EXTENDED RELEASE ORAL 2 TIMES DAILY
Status: DISCONTINUED | OUTPATIENT
Start: 2018-04-15 | End: 2018-04-26

## 2018-04-15 RX ORDER — HYDROCODONE BITARTRATE AND ACETAMINOPHEN 5; 325 MG/1; MG/1
1 TABLET ORAL 3 TIMES DAILY
Status: DISCONTINUED | OUTPATIENT
Start: 2018-04-15 | End: 2018-04-26

## 2018-04-15 RX ORDER — PREGABALIN 75 MG/1
150 CAPSULE ORAL 2 TIMES DAILY
Status: DISCONTINUED | OUTPATIENT
Start: 2018-04-15 | End: 2018-04-26

## 2018-04-15 RX ORDER — KETOROLAC TROMETHAMINE 10 MG/1
10 TABLET, FILM COATED ORAL EVERY 6 HOURS PRN
Status: DISPENSED | OUTPATIENT
Start: 2018-04-15 | End: 2018-04-20

## 2018-04-15 RX ADMIN — HEPARIN SODIUM 5000 UNITS: 5000 INJECTION, SOLUTION INTRAVENOUS; SUBCUTANEOUS at 13:10

## 2018-04-15 RX ADMIN — MICONAZOLE NITRATE: 20 CREAM TOPICAL at 21:25

## 2018-04-15 RX ADMIN — HYDROCODONE BITARTRATE AND ACETAMINOPHEN 1 TABLET: 5; 325 TABLET ORAL at 05:51

## 2018-04-15 RX ADMIN — HYDROCORTISONE 5 MG: 5 TABLET ORAL at 10:49

## 2018-04-15 RX ADMIN — POTASSIUM CHLORIDE 20 MEQ: 1500 TABLET, EXTENDED RELEASE ORAL at 21:11

## 2018-04-15 RX ADMIN — PREGABALIN 150 MG: 75 CAPSULE ORAL at 21:11

## 2018-04-15 RX ADMIN — HYDROCODONE BITARTRATE AND ACETAMINOPHEN 1 TABLET: 5; 325 TABLET ORAL at 14:07

## 2018-04-15 RX ADMIN — HYDROCODONE BITARTRATE AND ACETAMINOPHEN 1 TABLET: 5; 325 TABLET ORAL at 21:11

## 2018-04-15 RX ADMIN — AMPICILLIN SODIUM AND SULBACTAM SODIUM 3 G: 2; 1 INJECTION, POWDER, FOR SOLUTION INTRAMUSCULAR; INTRAVENOUS at 05:51

## 2018-04-15 RX ADMIN — HEPARIN SODIUM 5000 UNITS: 5000 INJECTION, SOLUTION INTRAVENOUS; SUBCUTANEOUS at 21:10

## 2018-04-15 RX ADMIN — ACETAMINOPHEN 650 MG: 325 TABLET, FILM COATED ORAL at 07:44

## 2018-04-15 RX ADMIN — MICONAZOLE NITRATE: 20 CREAM TOPICAL at 02:00

## 2018-04-15 RX ADMIN — ACETAMINOPHEN 650 MG: 325 TABLET, FILM COATED ORAL at 16:34

## 2018-04-15 RX ADMIN — POTASSIUM CHLORIDE 60 MEQ: 1500 TABLET, EXTENDED RELEASE ORAL at 10:40

## 2018-04-15 RX ADMIN — ASPIRIN 81 MG: 81 TABLET, COATED ORAL at 07:44

## 2018-04-15 RX ADMIN — MICONAZOLE NITRATE: 20 CREAM TOPICAL at 14:10

## 2018-04-15 RX ADMIN — KETOROLAC TROMETHAMINE 10 MG: 10 TABLET, FILM COATED ORAL at 14:12

## 2018-04-15 RX ADMIN — PREGABALIN 150 MG: 75 CAPSULE ORAL at 16:34

## 2018-04-15 RX ADMIN — AZITHROMYCIN 500 MG: 250 TABLET, FILM COATED ORAL at 07:44

## 2018-04-15 RX ADMIN — HYDROCODONE BITARTRATE AND ACETAMINOPHEN 1 TABLET: 5; 325 TABLET ORAL at 10:41

## 2018-04-15 RX ADMIN — HEPARIN SODIUM 5000 UNITS: 5000 INJECTION, SOLUTION INTRAVENOUS; SUBCUTANEOUS at 05:51

## 2018-04-15 RX ADMIN — SODIUM CHLORIDE 1000 MG: 9 INJECTION, SOLUTION INTRAVENOUS at 21:11

## 2018-04-15 RX ADMIN — POTASSIUM CHLORIDE 20 MEQ: 1500 TABLET, EXTENDED RELEASE ORAL at 07:44

## 2018-04-15 RX ADMIN — MICONAZOLE NITRATE: 20 CREAM TOPICAL at 08:08

## 2018-04-15 RX ADMIN — SODIUM CHLORIDE 1000 MG: 9 INJECTION, SOLUTION INTRAVENOUS at 11:30

## 2018-04-15 RX ADMIN — SODIUM CHLORIDE: 900 INJECTION INTRAVENOUS at 16:39

## 2018-04-15 ASSESSMENT — ENCOUNTER SYMPTOMS
COUGH: 0
SEIZURES: 0
FOCAL WEAKNESS: 0
BLOOD IN STOOL: 0
HEADACHES: 1
ABDOMINAL PAIN: 0
SENSORY CHANGE: 0
NAUSEA: 0
SHORTNESS OF BREATH: 0
DIZZINESS: 0
PALPITATIONS: 0
DIARRHEA: 1
SINUS PAIN: 0
SPEECH CHANGE: 1
FEVER: 0
VOMITING: 0

## 2018-04-15 ASSESSMENT — COGNITIVE AND FUNCTIONAL STATUS - GENERAL
DRESSING REGULAR LOWER BODY CLOTHING: A LOT
SUGGESTED CMS G CODE MODIFIER DAILY ACTIVITY: CK
EATING MEALS: A LITTLE
DAILY ACTIVITIY SCORE: 15
TOILETING: A LOT
DRESSING REGULAR UPPER BODY CLOTHING: A LITTLE
PERSONAL GROOMING: A LITTLE
HELP NEEDED FOR BATHING: A LOT

## 2018-04-15 ASSESSMENT — PAIN SCALES - GENERAL
PAINLEVEL_OUTOF10: 8
PAINLEVEL_OUTOF10: 7
PAINLEVEL_OUTOF10: 0
PAINLEVEL_OUTOF10: 8
PAINLEVEL_OUTOF10: 0
PAINLEVEL_OUTOF10: 0

## 2018-04-15 ASSESSMENT — ACTIVITIES OF DAILY LIVING (ADL): TOILETING: INDEPENDENT

## 2018-04-15 NOTE — DISCHARGE PLANNING
Medical Social Work    Referral:  Rounds    Intervention:  Pt discussed during rounds. Pt is not medically clear. Family would prefer that pt dc to a SNF closer to [place], [place] where they currently reside.     Plan:  Unit SW will obtain SNF choice and remain available for dc planning

## 2018-04-15 NOTE — DIETARY
"Nutrition services: Shasta Dunn is a 53 y.o. female with admitting DX of overdose.   Consult received for BMI <19.    Bed scale wts quite variable.  Current bed scale wt makes BMI <19 (18.57).  Met with pt and daughter.  Pt states usual wt is ~52 kg.  Per daughter, in the past pt was in the hospital for a year and lost half her body weight.  However, lately has been fairly stable.    Pt has limited choices on current diet per SLP (nectar thick full liquids). Pt dislikes most foods on the diet so she has not been eating very much.  Discussed food preferences and made changes to menu.  Will send two fruit smoothies per tray (RN to thicken) + thick apple juice + thick chocolate milk (no soup or cream of wheat per pt).         Assessment:  Height: 167.6 cm (5' 6\")  Weight: 52.2 kg (115 lb 1.3 oz)  Body mass index is 18.57 kg/m².  Pertinent medical history: psychiatric disorder    Evaluation:   1. Pt on day #6 of inadequate nutrition; po intake PTA unknown.    2. Labs: WBC 20.5, Na+ 147, glu 117  3. BM: loose stools d/t campylobactor diarrhea - on imodium.  Skin on sacrum/buttocks excoriated.   4. Feel pt would eat better if diet could be advanced by SLP - d/w RN.     Malnutrition Risk: high d/t low BMI and poor po x 6 days.      Recommendations/Plan:  1. SLP to re-eval for diet upgrade (tomorrow?)   2. Document intake of all meals/snacks as % taken in ADL's to provide interdisciplinary communication across all shifts.   3. Monitor weight.  4. Nutrition rep will continue to see patient for ongoing meal and snack preferences.   5. RD following.              "

## 2018-04-15 NOTE — PROGRESS NOTES
Neurology Progress Note               Author: AARON Duke Date & Time created: 4/15/2018  7:19 AM     Interval History:  No events overnight, no further suspected seizures.    Review of Systems:  ROS    Physical Exam:  Physical Exam   Neurological:   Alert, oriented to person and place.  Speech is improving, follows simple commands.  VF are full, PERRL.  Has mild R facial weakness,  No extremity weakness.       Labs:        Invalid input(s): [id number]  Recent Labs      18   TROPONINI  0.04   BNPBTYPENAT  252*     Recent Labs      04/13/18   0357  04/13/18   2233  04/15/18   0633   SODIUM  145  145  147*   POTASSIUM  2.8*  3.4*  3.1*   CHLORIDE  112  114*  114*   CO2  22  22  25   BUN  15  15  21   CREATININE  0.69  0.72  0.67   MAGNESIUM  1.9   --    --    PHOSPHORUS  2.7   --    --    CALCIUM  8.1*  9.0  8.6     Recent Labs      04/13/18   0357  04/13/18   2233  04/15/18   0633   ALTSGPT  43  41   --    ASTSGOT  26  20   --    ALKPHOSPHAT  39  36   --    TBILIRUBIN  1.1  1.0   --    GLUCOSE  144*  135*  117*     Recent Labs      04/13/18   0357  04/15/18   0633   RBC  4.14*  4.05*   HEMOGLOBIN  12.5  12.2   HEMATOCRIT  37.1  36.1*   PLATELETCT  123*  126*     Recent Labs      04/13/18   0357  04/13/18   2233  04/15/18   0633   WBC  16.2*   --   20.5*   NEUTSPOLYS  81.40*   --    --    LYMPHOCYTES  10.70*   --    --    MONOCYTES  7.10   --    --    EOSINOPHILS  0.00   --    --    BASOPHILS  0.20   --    --    ASTSGOT  26  20   --    ALTSGPT  43  41   --    ALKPHOSPHAT  39  36   --    TBILIRUBIN  1.1  1.0   --      Recent Labs      04/13/18   0357  04/13/18   2233  04/15/18   0633   SODIUM  145  145  147*   POTASSIUM  2.8*  3.4*  3.1*   CHLORIDE  112  114*  114*   CO2  22  22  25   GLUCOSE  144*  135*  117*   BUN  15  15  21   CREATININE  0.69  0.72  0.67   CALCIUM  8.1*  9.0  8.6     Hemodynamics:  Temp (24hrs), Av.5 °C (99.5 °F), Min:37.1 °C (98.7 °F), Max:38.1 °C (100.5 °F)  Temperature:  37.4 °C (99.4 °F)  Pulse  Av.6  Min: 41  Max: 111   Blood Pressure: 143/83     Respiratory:    Respiration: 16, Pulse Oximetry: 91 %        RUL Breath Sounds: Clear, RML Breath Sounds: Clear, RLL Breath Sounds: Clear, JOLYNN Breath Sounds: Clear, LLL Breath Sounds: Clear  Fluids:  No intake or output data in the 24 hours ending 04/15/18 0719  Weight: 52.2 kg (115 lb 1.3 oz)  GI/Nutrition:  Orders Placed This Encounter   Procedures   • DIET ORDER     Standing Status:   Standing     Number of Occurrences:   1     Order Specific Question:   Diet:     Answer:   Full Liquid [11]     Order Specific Question:   Consistency/Fluid modifications:     Answer:   Nectar Thick [2]     Order Specific Question:   Miscellaneous modifications:     Answer:   SLP - 1:1 Supervision by Nursing [21]     Medical Decision Making, by Problem:  Active Hospital Problems    Diagnosis   • *Stroke syndrome (CMS-HCC) [I63.9]   • Drug overdose - unintentional [T50.901A]   • Hypokalemia [E87.6]   • Chronic pain syndrome [G89.4]   • Seizure disorder as sequela of cerebrovascular accident (CMS-[facility]) [I69.398, G40.909]   • Campylobacter diarrhea [A04.5]   • Aspiration pneumonia (CMS-HCC) [J69.0]   • Hypotension [I95.9]   • Elevated troponin [R74.8]   • Elevated brain natriuretic peptide (BNP) level [R79.89]   • Leukocytosis [D72.829]   • Hyperglycemia [R73.9]       Plan:  53 yr patient admitted 4/10 with ALOC, possible seizure activity, MRI showed multifocal infarct, most prominent L frontal, EEG showed probable L seizure activity.  I was asked to see her again yesterday due to possible ongoing seizures.  Dose of keppra increased to 1gm bid iv, EEG ordered.  Hypercoaguable state w/u pending, she has an aunt who had a stroke in her 50's.  LACI ordered by hospitalist. On asa.    Quality-Core Measures

## 2018-04-15 NOTE — CARE PLAN
Problem: Bowel/Gastric:  Goal: Normal bowel function is maintained or improved  Outcome: PROGRESSING SLOWER THAN EXPECTED  Frequent diarrhea episodes with campylobacter infection.     Problem: Skin Integrity  Goal: Risk for impaired skin integrity will decrease  Outcome: PROGRESSING SLOWER THAN EXPECTED  Sacral region and buttocks raw and excoriated. CNA sitter at the bedside and assisting with incontinence episodes

## 2018-04-15 NOTE — PROGRESS NOTES
Neurology Progress Note               Author: AARON Duke Date & Time created: 4/14/2018  6:27 PM     Interval History:  Reported several episodes of shaking and unresponsiveness by RN.  Now alert, aphasic but speech is improving.  Daughter says patient grabs her hands during the episodes.    Review of Systems:  ROS    Physical Exam:  Physical Exam   Neurological:   Alert.  Follows simple commands, simple speech pattern. PERRL, looks to both sides.  Mild R facial weakness.  No extremity weakness, DTR incr on the R.         Labs:        Invalid input(s): [id number]  Recent Labs      04/13/18 0357   TROPONINI  0.04   BNPBTYPENAT  252*     Recent Labs      04/12/18 0235 04/13/18 0357 04/13/18 2233   SODIUM  143  145  145   POTASSIUM  3.0*  2.8*  3.4*   CHLORIDE  109  112  114*   CO2  22  22  22   BUN  16  15  15   CREATININE  0.77  0.69  0.72   MAGNESIUM  1.8  1.9   --    PHOSPHORUS   --   2.7   --    CALCIUM  8.5  8.1*  9.0     Recent Labs      04/12/18 0235 04/13/18 0357 04/13/18 2233   ALTSGPT  47  43  41   ASTSGOT  40  26  20   ALKPHOSPHAT  46  39  36   TBILIRUBIN  1.2  1.1  1.0   GLUCOSE  99  144*  135*     Recent Labs      04/12/18 0235 04/13/18 0357   RBC  4.27  4.14*   HEMOGLOBIN  12.8  12.5   HEMATOCRIT  37.8  37.1   PLATELETCT  147*  123*     Recent Labs      04/12/18 0235 04/13/18 0357 04/13/18 2233   WBC  19.0*  16.2*   --    NEUTSPOLYS  83.10*  81.40*   --    LYMPHOCYTES  9.00*  10.70*   --    MONOCYTES  7.30  7.10   --    EOSINOPHILS  0.00  0.00   --    BASOPHILS  0.20  0.20   --    ASTSGOT  40  26  20   ALTSGPT  47  43  41   ALKPHOSPHAT  46  39  36   TBILIRUBIN  1.2  1.1  1.0     Recent Labs      04/12/18 0235 04/13/18 0357 04/13/18 2233   SODIUM  143  145  145   POTASSIUM  3.0*  2.8*  3.4*   CHLORIDE  109  112  114*   CO2  22  22  22   GLUCOSE  99  144*  135*   BUN  16  15  15   CREATININE  0.77  0.69  0.72   CALCIUM  8.5  8.1*  9.0     Hemodynamics:  Temp  (24hrs), Av.2 °C (99 °F), Min:37 °C (98.6 °F), Max:37.5 °C (99.5 °F)  Temperature: 37.5 °C (99.5 °F)  Pulse  Av.4  Min: 41  Max: 111   Blood Pressure: 148/82     Respiratory:    Respiration: 16, Pulse Oximetry: 95 %        RUL Breath Sounds: Clear, RML Breath Sounds: Clear, RLL Breath Sounds: Clear, JOLYNN Breath Sounds: Clear, LLL Breath Sounds: Clear  Fluids:  No intake or output data in the 24 hours ending 187  Weight: 61.4 kg (135 lb 5.8 oz)  GI/Nutrition:  Orders Placed This Encounter   Procedures   • DIET ORDER     Standing Status:   Standing     Number of Occurrences:   1     Order Specific Question:   Diet:     Answer:   Full Liquid [11]     Order Specific Question:   Consistency/Fluid modifications:     Answer:   Nectar Thick [2]     Order Specific Question:   Miscellaneous modifications:     Answer:   SLP - 1:1 Supervision by Nursing [21]     Medical Decision Making, by Problem:  Active Hospital Problems    Diagnosis   • *Stroke syndrome (CMS-[facility]) [I63.9]   • Drug overdose - unintentional [T50.901A]   • Hypokalemia [E87.6]   • Chronic pain syndrome [G89.4]   • Seizure disorder as sequela of cerebrovascular accident (CMS-[facility]) [I69.398, G40.909]   • Campylobacter diarrhea [A04.5]   • Aspiration pneumonia (CMS-[facility]) [J69.0]   • Hypotension [I95.9]   • Elevated troponin [R74.8]   • Elevated brain natriuretic peptide (BNP) level [R79.89]   • Leukocytosis [D72.829]   • Hyperglycemia [R73.9]       Plan:  53 yr patient admitted 4/10 with ALOC, possible seizure activity.  MRI shows multifocal infarct, most prominent L frontal.  EEG showed probable L seizures.  Will increase keppra to 1gm iv bid.  Repeat EEG if episodes continue.  Will do hypercoaguable state workup.      Quality-Core Measures

## 2018-04-15 NOTE — PROGRESS NOTES
Monitor summary: SB 56-SR 77, AK .16, QRS .08, QT .36, with rare PVCs per strip from monitor room.

## 2018-04-15 NOTE — THERAPY
"Occupational Therapy Evaluation completed.   Functional Status:  Pt is a 52 y/o female admitted with multiple infarcts, primarily left, unintentional drug overdose, seizure, and diarrhea. She is pleasant and cooperative, limited by a headache this date. RN aware. She has delayed responses and is slightly disoriented throughout, but dtr is present and reorienting her throughout the day. Pt able to sit EOB with CGA and complete grooming with setup. She required maxA for LB dressing today due to fatigue and headache. BUE AROM/strength WDL, vision WDL. She tolerated about 10 min sitting EOB before laying herself down, declining any further OOB activity due to headache. She was able to roll side to side in bed with Samira for linen change. Pt limited by weakness, fatigue, impaired balance, impaired cog, and pain which impacts independence in ADLs and functional mobility.  Plan of Care: Will benefit from Occupational Therapy 3 times per week  Discharge Recommendations:  Equipment: Will Continue to Assess for Equipment Needs. At this point, Discharge to a transitional care facility for continued skilled therapy services.    See \"Rehab Therapy-Acute\" Patient Summary Report for complete documentation.    "

## 2018-04-15 NOTE — PROGRESS NOTES
Pt alert, unable to answer orientation questions appropriately, at times able to respond to yes/no questions. No c/o pain, N/V, N/T. Pt takes pills whole with yogurt drink. Pt incontinent of bowel and bladder with frequent diarrhea, sacral region raw and excoriated. Nystatin cream and barrier wipes in use. Sitting in place, bed alarm on, call light and personal belongings within reach, hourly rounding in place.

## 2018-04-16 LAB
ANION GAP SERPL CALC-SCNC: 8 MMOL/L (ref 0–11.9)
BASOPHILS # BLD AUTO: 0.5 % (ref 0–1.8)
BASOPHILS # BLD: 0.07 K/UL (ref 0–0.12)
BUN SERPL-MCNC: 17 MG/DL (ref 8–22)
CALCIUM SERPL-MCNC: 7.8 MG/DL (ref 8.5–10.5)
CHLORIDE SERPL-SCNC: 113 MMOL/L (ref 96–112)
CO2 SERPL-SCNC: 22 MMOL/L (ref 20–33)
CREAT SERPL-MCNC: 0.64 MG/DL (ref 0.5–1.4)
EOSINOPHIL # BLD AUTO: 0.43 K/UL (ref 0–0.51)
EOSINOPHIL NFR BLD: 2.9 % (ref 0–6.9)
ERYTHROCYTE [DISTWIDTH] IN BLOOD BY AUTOMATED COUNT: 49.1 FL (ref 35.9–50)
GLUCOSE SERPL-MCNC: 86 MG/DL (ref 65–99)
HCT VFR BLD AUTO: 35.4 % (ref 37–47)
HGB BLD-MCNC: 11.2 G/DL (ref 12–16)
IMM GRANULOCYTES # BLD AUTO: 0.07 K/UL (ref 0–0.11)
IMM GRANULOCYTES NFR BLD AUTO: 0.5 % (ref 0–0.9)
LYMPHOCYTES # BLD AUTO: 2.66 K/UL (ref 1–4.8)
LYMPHOCYTES NFR BLD: 18.1 % (ref 22–41)
MCH RBC QN AUTO: 29.6 PG (ref 27–33)
MCHC RBC AUTO-ENTMCNC: 31.6 G/DL (ref 33.6–35)
MCV RBC AUTO: 93.4 FL (ref 81.4–97.8)
MONOCYTES # BLD AUTO: 0.78 K/UL (ref 0–0.85)
MONOCYTES NFR BLD AUTO: 5.3 % (ref 0–13.4)
NEUTROPHILS # BLD AUTO: 10.7 K/UL (ref 2–7.15)
NEUTROPHILS NFR BLD: 72.7 % (ref 44–72)
NRBC # BLD AUTO: 0 K/UL
NRBC BLD-RTO: 0 /100 WBC
PLATELET # BLD AUTO: 134 K/UL (ref 164–446)
PMV BLD AUTO: 11.7 FL (ref 9–12.9)
POTASSIUM SERPL-SCNC: 3.7 MMOL/L (ref 3.6–5.5)
RBC # BLD AUTO: 3.79 M/UL (ref 4.2–5.4)
SODIUM SERPL-SCNC: 143 MMOL/L (ref 135–145)
WBC # BLD AUTO: 14.7 K/UL (ref 4.8–10.8)

## 2018-04-16 PROCEDURE — G8979 MOBILITY GOAL STATUS: HCPCS | Mod: CI

## 2018-04-16 PROCEDURE — 700105 HCHG RX REV CODE 258: Performed by: SPECIALIST

## 2018-04-16 PROCEDURE — 99232 SBSQ HOSP IP/OBS MODERATE 35: CPT | Performed by: HOSPITALIST

## 2018-04-16 PROCEDURE — 700102 HCHG RX REV CODE 250 W/ 637 OVERRIDE(OP): Performed by: NURSE PRACTITIONER

## 2018-04-16 PROCEDURE — 97162 PT EVAL MOD COMPLEX 30 MIN: CPT

## 2018-04-16 PROCEDURE — 96105 ASSESSMENT OF APHASIA: CPT

## 2018-04-16 PROCEDURE — G8978 MOBILITY CURRENT STATUS: HCPCS | Mod: CK

## 2018-04-16 PROCEDURE — 80048 BASIC METABOLIC PNL TOTAL CA: CPT

## 2018-04-16 PROCEDURE — G8997 SWALLOW GOAL STATUS: HCPCS | Mod: CI

## 2018-04-16 PROCEDURE — 700111 HCHG RX REV CODE 636 W/ 250 OVERRIDE (IP): Performed by: INTERNAL MEDICINE

## 2018-04-16 PROCEDURE — A9270 NON-COVERED ITEM OR SERVICE: HCPCS | Performed by: NURSE PRACTITIONER

## 2018-04-16 PROCEDURE — 36415 COLL VENOUS BLD VENIPUNCTURE: CPT

## 2018-04-16 PROCEDURE — G8996 SWALLOW CURRENT STATUS: HCPCS | Mod: CJ

## 2018-04-16 PROCEDURE — A9270 NON-COVERED ITEM OR SERVICE: HCPCS | Performed by: INTERNAL MEDICINE

## 2018-04-16 PROCEDURE — 770006 HCHG ROOM/CARE - MED/SURG/GYN SEMI*

## 2018-04-16 PROCEDURE — 85025 COMPLETE CBC W/AUTO DIFF WBC: CPT

## 2018-04-16 PROCEDURE — 700102 HCHG RX REV CODE 250 W/ 637 OVERRIDE(OP): Performed by: INTERNAL MEDICINE

## 2018-04-16 PROCEDURE — 700111 HCHG RX REV CODE 636 W/ 250 OVERRIDE (IP): Performed by: SPECIALIST

## 2018-04-16 PROCEDURE — 92526 ORAL FUNCTION THERAPY: CPT

## 2018-04-16 PROCEDURE — 95951 EEG: CPT | Mod: 52

## 2018-04-16 RX ADMIN — HEPARIN SODIUM 5000 UNITS: 5000 INJECTION, SOLUTION INTRAVENOUS; SUBCUTANEOUS at 13:57

## 2018-04-16 RX ADMIN — ACETAMINOPHEN 650 MG: 325 TABLET, FILM COATED ORAL at 05:08

## 2018-04-16 RX ADMIN — HYDROCODONE BITARTRATE AND ACETAMINOPHEN 1 TABLET: 5; 325 TABLET ORAL at 13:57

## 2018-04-16 RX ADMIN — KETOROLAC TROMETHAMINE 10 MG: 10 TABLET, FILM COATED ORAL at 06:07

## 2018-04-16 RX ADMIN — SODIUM CHLORIDE 1000 MG: 9 INJECTION, SOLUTION INTRAVENOUS at 20:01

## 2018-04-16 RX ADMIN — PREGABALIN 150 MG: 75 CAPSULE ORAL at 08:08

## 2018-04-16 RX ADMIN — MICONAZOLE NITRATE: 20 CREAM TOPICAL at 01:13

## 2018-04-16 RX ADMIN — POTASSIUM CHLORIDE 20 MEQ: 1500 TABLET, EXTENDED RELEASE ORAL at 20:00

## 2018-04-16 RX ADMIN — HYDROCORTISONE 5 MG: 5 TABLET ORAL at 08:08

## 2018-04-16 RX ADMIN — MICONAZOLE NITRATE: 20 CREAM TOPICAL at 13:59

## 2018-04-16 RX ADMIN — POTASSIUM CHLORIDE 20 MEQ: 1500 TABLET, EXTENDED RELEASE ORAL at 08:08

## 2018-04-16 RX ADMIN — ASPIRIN 81 MG: 81 TABLET, COATED ORAL at 08:08

## 2018-04-16 RX ADMIN — PREGABALIN 150 MG: 75 CAPSULE ORAL at 20:00

## 2018-04-16 RX ADMIN — LOPERAMIDE HYDROCHLORIDE 2 MG: 2 CAPSULE ORAL at 06:07

## 2018-04-16 RX ADMIN — MICONAZOLE NITRATE: 20 CREAM TOPICAL at 08:09

## 2018-04-16 RX ADMIN — HYDROCODONE BITARTRATE AND ACETAMINOPHEN 1 TABLET: 5; 325 TABLET ORAL at 08:08

## 2018-04-16 RX ADMIN — HEPARIN SODIUM 5000 UNITS: 5000 INJECTION, SOLUTION INTRAVENOUS; SUBCUTANEOUS at 20:07

## 2018-04-16 RX ADMIN — LOPERAMIDE HYDROCHLORIDE 2 MG: 2 CAPSULE ORAL at 20:26

## 2018-04-16 RX ADMIN — SODIUM CHLORIDE 1000 MG: 9 INJECTION, SOLUTION INTRAVENOUS at 08:16

## 2018-04-16 RX ADMIN — HEPARIN SODIUM 5000 UNITS: 5000 INJECTION, SOLUTION INTRAVENOUS; SUBCUTANEOUS at 05:08

## 2018-04-16 RX ADMIN — HYDROCODONE BITARTRATE AND ACETAMINOPHEN 1 TABLET: 5; 325 TABLET ORAL at 20:00

## 2018-04-16 ASSESSMENT — COGNITIVE AND FUNCTIONAL STATUS - GENERAL
CLIMB 3 TO 5 STEPS WITH RAILING: A LITTLE
WALKING IN HOSPITAL ROOM: A LITTLE
SUGGESTED CMS G CODE MODIFIER MOBILITY: CJ
MOBILITY SCORE: 20
MOVING FROM LYING ON BACK TO SITTING ON SIDE OF FLAT BED: A LITTLE
STANDING UP FROM CHAIR USING ARMS: A LITTLE

## 2018-04-16 ASSESSMENT — PAIN SCALES - GENERAL
PAINLEVEL_OUTOF10: 0
PAINLEVEL_OUTOF10: 2
PAINLEVEL_OUTOF10: 2
PAINLEVEL_OUTOF10: 9
PAINLEVEL_OUTOF10: 0
PAINLEVEL_OUTOF10: 2
PAINLEVEL_OUTOF10: 9
PAINLEVEL_OUTOF10: 8

## 2018-04-16 ASSESSMENT — ENCOUNTER SYMPTOMS
DIARRHEA: 1
VOMITING: 0
FEVER: 0
SENSORY CHANGE: 0
NAUSEA: 0
HEADACHES: 1
DIZZINESS: 0
SEIZURES: 0
PALPITATIONS: 0
SHORTNESS OF BREATH: 0
SINUS PAIN: 0
COUGH: 0
FOCAL WEAKNESS: 0
SPEECH CHANGE: 1
BLOOD IN STOOL: 0
ABDOMINAL PAIN: 0

## 2018-04-16 ASSESSMENT — GAIT ASSESSMENTS
GAIT LEVEL OF ASSIST: CONTACT GUARD ASSIST
ASSISTIVE DEVICE: FRONT WHEEL WALKER
DEVIATION: DECREASED BASE OF SUPPORT;DECREASED HEEL STRIKE;DECREASED TOE OFF
DISTANCE (FEET): 200

## 2018-04-16 NOTE — THERAPY
"Physical Therapy Evaluation completed.   Bed Mobility:  Supine to Sit: Stand by Assist  Transfers: Sit to Stand: Contact Guard Assist  Gait: Level Of Assist: Contact Guard Assist with Front-Wheel Walker       Plan of Care: Will benefit from Physical Therapy 3 times per week  Discharge Recommendations: Equipment: Will Continue to Assess for Equipment Needs.     See \"Rehab Therapy-Acute\" Patient Summary Report for complete documentation.     RN notified of PT visit, clear for PT evaluation. Pt. presented to PT with impaired motor function and sensory integrity associated with a non progressive CNS disorder. Pt presented with imparied balance, imparied corrdination, poor saftey awareness, and decreased activity tolerance. Pt. was able to demonstrate CGA to SBA for all functional mobility at this time w/FWW. Demonstrates with consistent veering during ambulation with a scissoring gait pattern with a narrow DEQUAN; does well with VC's for heel to toe mechanics. Pt will benefit from continued skilled PT while in the acute care setting, with recommendation for post acute rehab therapy services prior to d/c home given current objective findings, age, IPLOF, and limited social support. Pt appears to be able to tolerate 3 hours of intensive therapy at this time.   "

## 2018-04-16 NOTE — PROCEDURES
VIDEO ELECTROENCEPHALOGRAM REPORT        Referring MD: Dr. Parker.      DOS:  4/16/2018 (total recording of 22 minutes).      INDICATION:  Shasta Dunn 53 y.o. female presenting with      CURRENT ANTIEPILEPTIC REGIMEN: Levetiracetam, Pregabalin.      TECHNIQUE: 30 channel video electroencephalogram (EEG) was performed in accordance with the international 10-20 system. The study was reviewed in bipolar and referential montages. The recording examined the patient during wakeful and drowsy/sleep state(s).      DESCRIPTION OF THE RECORD:  During the wakefulness, the background showed a symmetrical 10 Hz alpha activity posteriorly with amplitude of 70 mV.  There was reactivity to eye closure/opening.  A normal anterior-posterior gradient was noted with faster beta frequencies seen anteriorly.  During drowsiness, theta/delta frequencies were seen.     During the sleep state, background shows diffuse high-amplitude 4-5 Hz delta activity.  Symmetrical high-amplitude sleep spindles and vertex sharps were seen in the leads over the central regions.      ACTIVATION PROCEDURES:   Not performed.      ICTAL AND/OR INTERICTAL FINDINGS:   Rare left frontal sharps and slowing. No clinical events or seizures were reported or recorded during the study.      EKG: sampling of the EKG recording demonstrated sinus rhythm.      EVENTS:  None.      INTERPRETATION:  This is an abnormal video EEG recording in the awake, drowsy/sleep state(s). Rare left frontal sharps and slowing. The findings increase risk for seizures and suggest underlying area(s) of cortical irritability and structural abnormality. No seizure captured during the study. Clinical and radiological correlation is recommended.        Deon Byrd MD   Epilepsy and Neurodiagnostics.   Clinical  of Neurology [facility].   Diplomate in Neurology, Epilepsy, and Electrodiagnostic Medicine.   Office: [phone]  Fax:  [phone]       MURPHY HOLLAND    DD:  04/16/2018 16:23:20  DT:  04/16/2018 16:31:39    D#:  [id number]  Job#:  [id number]

## 2018-04-16 NOTE — CARE PLAN
Problem: Safety  Goal: Will remain free from falls  Bed locked in lowest position. Call light within reach. Bed alarm activated.      Problem: Skin Integrity  Goal: Risk for impaired skin integrity will decrease  Q2h turning in place to maintain integrity of pt's skin.

## 2018-04-16 NOTE — PROGRESS NOTES
[facility] Hospitalist Progress Note    Date of Service: 2018    Chief Complaint  53 y.o. female admitted 4/10/2018 for AMS presumably due to opiate OD, as she responded to narcan. Found to have CVA in addition to campylobacter infection in her stool. She has an abnormal EEG.    Interval Problem Update  Continued expressive dysphasia. Much improved. Faster responses today. Still with diarrhea - which is chronic per record review. Mild HA. She denies specific complaints.    Consultants/Specialty  Neurology    Disposition  Rehab order placed. Family wanting to find a place closer to [place], [place] where the patient & her family live.  Will need to coordinate loop recorder and local follow up there.        Review of Systems   Constitutional: Negative for fever and malaise/fatigue.   HENT: Negative for congestion, nosebleeds and sinus pain.    Respiratory: Negative for cough and shortness of breath.    Cardiovascular: Negative for chest pain, palpitations and leg swelling.   Gastrointestinal: Positive for diarrhea. Negative for abdominal pain, blood in stool, nausea and vomiting.   Musculoskeletal:        Denies pain   Neurological: Positive for speech change and headaches. Negative for dizziness, sensory change, focal weakness and seizures.   All other systems reviewed and are negative.       Physical Exam  Laboratory/Imaging   Hemodynamics  Temp (24hrs), Av.9 °C (98.4 °F), Min:36.3 °C (97.3 °F), Max:37.2 °C (99 °F)   Temperature: 36.8 °C (98.3 °F)  Pulse  Av.9  Min: 41  Max: 111    Blood Pressure: 113/63      Respiratory  Respiration: 15, Pulse Oximetry: 91 %  RUL Breath Sounds: Clear, RML Breath Sounds: Clear, RLL Breath Sounds: Clear;Diminished, JOLYNN Breath Sounds: Clear, LLL Breath Sounds: Clear;Diminished    Fluids    Intake/Output Summary (Last 24 hours) at 18 1349  Last data filed at 04/15/18 1700   Gross per 24 hour   Intake              500 ml   Output                0 ml   Net              500 ml      Nutrition  Orders Placed This Encounter   Procedures   • DIET ORDER     Standing Status:   Standing     Number of Occurrences:   1     Order Specific Question:   Diet:     Answer:   Full Liquid [11]     Order Specific Question:   Consistency/Fluid modifications:     Answer:   Nectar Thick [2]     Order Specific Question:   Miscellaneous modifications:     Answer:   SLP - 1:1 Supervision by Nursing [21]   • DIET NPO     Standing Status:   Standing     Number of Occurrences:   8     Order Specific Question:   Restrict to:     Answer:   Strict [1]     Physical Exam   Constitutional: She is oriented to person, place, and time. She appears well-developed and well-nourished. She is cooperative. No distress.   Thin     HENT:   Head: Normocephalic and atraumatic.   Eyes: Conjunctivae and EOM are normal. Pupils are equal, round, and reactive to light. Right eye exhibits no nystagmus. Left eye exhibits no nystagmus. Right pupil is round and reactive. Left pupil is round and reactive. Pupils are equal.   Neck: Neck supple. No JVD present. No edema present.   Cardiovascular: Normal rate, regular rhythm, normal heart sounds and intact distal pulses.  Exam reveals no gallop and no friction rub.    No murmur heard.  SR on tele monitoring     Pulmonary/Chest: Effort normal and breath sounds normal. No stridor. No tachypnea. No respiratory distress. She has no decreased breath sounds. She has no wheezes. She has no rhonchi. She has no rales.   Not on O2   Abdominal: Normal appearance. She exhibits no abdominal bruit.   Musculoskeletal: Normal range of motion. She exhibits no edema.   Neurological: She is alert and oriented to person, place, and time. She has normal strength. No cranial nerve deficit or sensory deficit. She displays no seizure activity. GCS eye subscore is 4. GCS verbal subscore is 4. GCS motor subscore is 6.        Skin: Skin is warm and dry. She is not diaphoretic. No pallor.   Psychiatric: She has a normal  mood and affect. Judgment normal. She does not express impulsivity. She is attentive.   Nursing note and vitals reviewed.    Recent Labs      04/15/18   0633  04/16/18 0222   WBC  20.5*  14.7*   RBC  4.05*  3.79*   HEMOGLOBIN  12.2  11.2*   HEMATOCRIT  36.1*  35.4*   MCV  89.1  93.4   MCH  30.1  29.6   MCHC  33.8  31.6*   RDW  46.5  49.1   PLATELETCT  126*  134*   MPV  10.9  11.7     Recent Labs      04/13/18 2233  04/15/18   0633  04/16/18 0222   SODIUM  145  147*  143   POTASSIUM  3.4*  3.1*  3.7   CHLORIDE  114*  114*  113*   CO2  22  25  22   GLUCOSE  135*  117*  86   BUN  15  21  17   CREATININE  0.72  0.67  0.64   CALCIUM  9.0  8.6  7.8*                      Assessment/Plan     * Stroke syndrome (CMS-HCC)   Assessment & Plan    Multifocal infarctions, largest in the left frontal lobe, left posterior occipital lobe, right parietal-occipital cortex, and right hemicerebellum, respectively  Neuro following.  Echo unremarkable. EF 70%. No bubble study. Discussed with Neurology. Given unclear etiology, LACI w bubble study to eval MAUREEN/possible PFO reasonable.  Carotid US normal.  Lipid panel showed LDL of 54 so will hold off on statin.  All other values were also WNL.  On ASA.  No plavix per neuro.  SR on tele.  Hypercoaguable state workup started per neuro - labs sent out. Will need f/u as OP  Loop recorder as an outpatient for follow up outside the area  PT/OT/SLP  SNF referral          Drug overdose - unintentional- (present on admission)   Assessment & Plan    Buprenorphine patch removed PTA.   Patient was on heavy doses of narcotics at home for chronic pain vs fibromyalgia  Denies suicidal ideation or history  Weaning Norco            Seizure disorder as sequela of cerebrovascular accident (CMS-HCC)- (present on admission)   Assessment & Plan    On BID Keppra.  Neurology on.        Chronic pain syndrome- (present on admission)   Assessment & Plan    Currently denies pain. Only required norco once  overnight.  Wean Norco          Hypokalemia   Assessment & Plan    Secondary to wasting from diarrhea.   Tolerating oral replacement  Klor Con increased to 40 BID 4/15        Aspiration pneumonia (CMS-HCC)- (present on admission)   Assessment & Plan    Treated with Unasyn.  Repeat cxr in appox 4-6 weeks to assess for resolution. Will continue to monitor respiratory status closely while inpatient.  CT chest normal        Campylobacter diarrhea- (present on admission)   Assessment & Plan    Campylobacter on cultures from transferring facility. C-diff negative here on 4/10/18.  Still having frequent bouts of watery diarrhea. Possible contribution from opioid withdrawal.  No physical exam findings of enteritis.  PRN loperamide  Records review shows longstanding N/V/D. Unclear etiology        Hyperglycemia- (present on admission)   Assessment & Plan    A1C normal at 5.4. No hx of DM.  Will continue to monitor on a.m. labs.            Leukocytosis- (present on admission)   Assessment & Plan    WBC trending down.  One episode of fever 100.5  Completed Unasyn. Azithro complete 4/16.  Procalcitonin was normal.  On Cortef due to hypotension          Elevated brain natriuretic peptide (BNP) level- (present on admission)   Assessment & Plan    BNP 8000s on arrival.   TTE showed normal diastolic function & EF 70%.  No evidence of FVO on exam.          Elevated troponin- (present on admission)   Assessment & Plan    No CP or hx of CAD.  Normal echocardiogram.  Repeat trending down.        Autonomic neuropathy- (present on admission)   Assessment & Plan    No hypotension since transfer to neuro. BP stable. Will allow for permissive hypertension in light of multiple strokes.  Continue low dose Cortef  [facility] from [facility] show longstanding midodrine, cortef/fluorinef use          Quality-Core Measures   Reviewed items::  Labs reviewed, Medications reviewed, Radiology images reviewed and EKG reviewed  Bishop catheter::  No  Bishop  DVT prophylaxis pharmacological::  Heparin  : JESSICA hose bilaterally.  Ulcer Prophylaxis::  Not indicated  Assessed for rehabilitation services:  Patient was assess for and/or received rehabilitation services during this hospitalization

## 2018-04-16 NOTE — EEG PROGRESS NOTE
VIDEO ELECTROENCEPHALOGRAM REPORT      Referring MD: Dr. Parker.     DOS:  4/16/2018 (total recording of 22 minutes).     INDICATION:  Shasta Dunn 53 y.o. female presenting with     CURRENT ANTIEPILEPTIC REGIMEN: Levetiracetam, Pregabalin.     TECHNIQUE: 30 channel video electroencephalogram (EEG) was performed in accordance with the international 10-20 system. The study was reviewed in bipolar and referential montages. The recording examined the patient during wakeful and drowsy/sleep state(s).     DESCRIPTION OF THE RECORD:  During the wakefulness, the background showed a symmetrical 10 Hz alpha activity posteriorly with amplitude of 70 mV.  There was reactivity to eye closure/opening.  A normal anterior-posterior gradient was noted with faster beta frequencies seen anteriorly.  During drowsiness, theta/delta frequencies were seen.    During the sleep state, background shows diffuse high-amplitude 4-5 Hz delta activity.  Symmetrical high-amplitude sleep spindles and vertex sharps were seen in the leads over the central regions.     ACTIVATION PROCEDURES:   Not performed.     ICTAL AND/OR INTERICTAL FINDINGS:   Rare left frontal sharps and slowing. No clinical events or seizures were reported or recorded during the study.     EKG: sampling of the EKG recording demonstrated sinus rhythm.     EVENTS:  None.     INTERPRETATION:  This is an abnormal video EEG recording in the awake, drowsy/sleep state(s). Rare left frontal sharps and slowing. The findings increase risk for seizures and suggest underlying area(s) of cortical irritability and structural abnormality. No seizure captured during the study. Clinical and radiological correlation is recommended.      Deno Byrd MD   Epilepsy and Neurodiagnostics.   Clinical  of Neurology [facility].   Diplomate in Neurology, Epilepsy, and Electrodiagnostic Medicine.   Office: [phone]  Fax:  [phone]

## 2018-04-16 NOTE — DISCHARGE PLANNING
Pt's family requesting pt go to a SNF in [place]. SW called daughter, Ian and received verbal auth for:    1.) [facility], [phone]    2.) [facility]     [place], [phone]    3.) [facility] [place]  [phone]     4.) [facility], [phone]    5.) [facility], [phone]     6.) [facility] [place] [facility] [place], [phone]    7.) [facility], [phone]    Choice faxed to [facility].

## 2018-04-16 NOTE — THERAPY
"Speech Language Therapy dysphagia treatment completed.   Functional Status:  Fxnl alertness and swallow for diet upgrade trials with D2/3 materials and thin liquids.  Tolerated upgraded textures without s/s of aspiration.  Thin liquids with 1-2x vocal quality change, cleared w/ double swallow.    Recommendations: Upgrade diet to D3/NTL with SLP to further trial thin liquids upon next visit.  Plan of Care: Will benefit from Speech Therapy 5 times per week  Post-Acute Therapy: Discharge to a transitional care facility for continued skilled therapy services vs Discharge to home with outpatient or home health for additional skilled therapy services; short term therapy would be of benefit post acute care discarge..    See \"Rehab Therapy-Acute\" Patient Summary Report for complete documentation.     "

## 2018-04-16 NOTE — PROGRESS NOTES
Pt A+Ox2, disoriented to event and time. Pt VALENCIA. Denies pain, n/t or n/v. Pt is incontinent of b/b. Sacrum is very red and rashy. Cream applied as needed. All questions answered regarding POC.

## 2018-04-16 NOTE — DISCHARGE PLANNING
Received choice form from Polly). Referral sent to [facility], [facility], [facility], [facility], [facility],  [facility] and [facility].

## 2018-04-16 NOTE — PROCEDURES
VIDEO ELECTROENCEPHALOGRAM REPORT        Referring MD: Dr. Parker.      DOS:  4/16/2018 (total recording of 22 minutes).      INDICATION:  Shasta Dunn 53 y.o. female presenting with      CURRENT ANTIEPILEPTIC REGIMEN: Levetiracetam, Pregabalin.      TECHNIQUE: 30 channel video electroencephalogram (EEG) was performed in accordance with the international 10-20 system. The study was reviewed in bipolar and referential montages. The recording examined the patient during wakeful and drowsy/sleep state(s).      DESCRIPTION OF THE RECORD:  During the wakefulness, the background showed a symmetrical 10 Hz alpha activity posteriorly with amplitude of 70 mV.  There was reactivity to eye closure/opening.  A normal anterior-posterior gradient was noted with faster beta frequencies seen anteriorly.  During drowsiness, theta/delta frequencies were seen.     During the sleep state, background shows diffuse high-amplitude 4-5 Hz delta activity.  Symmetrical high-amplitude sleep spindles and vertex sharps were seen in the leads over the central regions.      ACTIVATION PROCEDURES:   Not performed.      ICTAL AND/OR INTERICTAL FINDINGS:   Rare left frontal sharps and slowing. No clinical events or seizures were reported or recorded during the study.      EKG: sampling of the EKG recording demonstrated sinus rhythm.      EVENTS:  None.      INTERPRETATION:  This is an abnormal video EEG recording in the awake, drowsy/sleep state(s). Rare left frontal sharps and slowing. The findings increase risk for seizures and suggest underlying area(s) of cortical irritability and structural abnormality. No seizure captured during the study. Clinical and radiological correlation is recommended.        Deon Byrd MD   Epilepsy and Neurodiagnostics.   Clinical  of Neurology [facility].   Diplomate in Neurology, Epilepsy, and Electrodiagnostic Medicine.   Office: [phone]  Fax:  [phone]       MURPHY HOLLAND    DD:  04/16/2018 16:17:38  DT:  04/16/2018 16:27:25    D#:  [id number]  Job#:  [id number]

## 2018-04-17 LAB
ANION GAP SERPL CALC-SCNC: 4 MMOL/L (ref 0–11.9)
BUN SERPL-MCNC: 13 MG/DL (ref 8–22)
CALCIUM SERPL-MCNC: 7.9 MG/DL (ref 8.5–10.5)
CARDIOLIPIN IGA SER IA-ACNC: 1 APL (ref 0–11)
CARDIOLIPIN IGG SER IA-ACNC: 3 GPL (ref 0–14)
CARDIOLIPIN IGM SER IA-ACNC: 0 MPL (ref 0–12)
CHLORIDE SERPL-SCNC: 111 MMOL/L (ref 96–112)
CO2 SERPL-SCNC: 24 MMOL/L (ref 20–33)
CREAT SERPL-MCNC: 0.6 MG/DL (ref 0.5–1.4)
ERYTHROCYTE [DISTWIDTH] IN BLOOD BY AUTOMATED COUNT: 49.1 FL (ref 35.9–50)
GLUCOSE SERPL-MCNC: 91 MG/DL (ref 65–99)
HCT VFR BLD AUTO: 34.5 % (ref 37–47)
HGB BLD-MCNC: 11.2 G/DL (ref 12–16)
MCH RBC QN AUTO: 29.9 PG (ref 27–33)
MCHC RBC AUTO-ENTMCNC: 32.5 G/DL (ref 33.6–35)
MCV RBC AUTO: 92 FL (ref 81.4–97.8)
PLATELET # BLD AUTO: 146 K/UL (ref 164–446)
PMV BLD AUTO: 10.9 FL (ref 9–12.9)
POTASSIUM SERPL-SCNC: 3.7 MMOL/L (ref 3.6–5.5)
PROT C ACT/NOR PPP: 97 % (ref 83–168)
PROT S ACT/NOR PPP: 81 % (ref 57–131)
RBC # BLD AUTO: 3.75 M/UL (ref 4.2–5.4)
SODIUM SERPL-SCNC: 139 MMOL/L (ref 135–145)
WBC # BLD AUTO: 13.9 K/UL (ref 4.8–10.8)

## 2018-04-17 PROCEDURE — 36415 COLL VENOUS BLD VENIPUNCTURE: CPT

## 2018-04-17 PROCEDURE — 700111 HCHG RX REV CODE 636 W/ 250 OVERRIDE (IP): Performed by: SPECIALIST

## 2018-04-17 PROCEDURE — 80048 BASIC METABOLIC PNL TOTAL CA: CPT

## 2018-04-17 PROCEDURE — 93325 DOPPLER ECHO COLOR FLOW MAPG: CPT

## 2018-04-17 PROCEDURE — A9270 NON-COVERED ITEM OR SERVICE: HCPCS | Performed by: NURSE PRACTITIONER

## 2018-04-17 PROCEDURE — A9270 NON-COVERED ITEM OR SERVICE: HCPCS | Performed by: INTERNAL MEDICINE

## 2018-04-17 PROCEDURE — 700111 HCHG RX REV CODE 636 W/ 250 OVERRIDE (IP)

## 2018-04-17 PROCEDURE — 700102 HCHG RX REV CODE 250 W/ 637 OVERRIDE(OP): Performed by: NURSE PRACTITIONER

## 2018-04-17 PROCEDURE — 770006 HCHG ROOM/CARE - MED/SURG/GYN SEMI*

## 2018-04-17 PROCEDURE — 700102 HCHG RX REV CODE 250 W/ 637 OVERRIDE(OP): Performed by: INTERNAL MEDICINE

## 2018-04-17 PROCEDURE — 700111 HCHG RX REV CODE 636 W/ 250 OVERRIDE (IP): Performed by: INTERNAL MEDICINE

## 2018-04-17 PROCEDURE — 85027 COMPLETE CBC AUTOMATED: CPT

## 2018-04-17 PROCEDURE — B24BZZ4 ULTRASONOGRAPHY OF HEART WITH AORTA, TRANSESOPHAGEAL: ICD-10-PCS | Performed by: NURSE PRACTITIONER

## 2018-04-17 PROCEDURE — 93312 ECHO TRANSESOPHAGEAL: CPT

## 2018-04-17 PROCEDURE — 99233 SBSQ HOSP IP/OBS HIGH 50: CPT | Performed by: FAMILY MEDICINE

## 2018-04-17 PROCEDURE — 700105 HCHG RX REV CODE 258: Performed by: SPECIALIST

## 2018-04-17 PROCEDURE — 160002 HCHG RECOVERY MINUTES (STAT)

## 2018-04-17 RX ADMIN — MICONAZOLE NITRATE: 20 CREAM TOPICAL at 11:39

## 2018-04-17 RX ADMIN — HEPARIN SODIUM 5000 UNITS: 5000 INJECTION, SOLUTION INTRAVENOUS; SUBCUTANEOUS at 14:00

## 2018-04-17 RX ADMIN — MICONAZOLE NITRATE: 20 CREAM TOPICAL at 14:18

## 2018-04-17 RX ADMIN — PREGABALIN 150 MG: 75 CAPSULE ORAL at 11:38

## 2018-04-17 RX ADMIN — HYDROCODONE BITARTRATE AND ACETAMINOPHEN 1 TABLET: 5; 325 TABLET ORAL at 20:10

## 2018-04-17 RX ADMIN — MICONAZOLE NITRATE: 20 CREAM TOPICAL at 20:17

## 2018-04-17 RX ADMIN — HEPARIN SODIUM 5000 UNITS: 5000 INJECTION, SOLUTION INTRAVENOUS; SUBCUTANEOUS at 20:10

## 2018-04-17 RX ADMIN — POTASSIUM CHLORIDE 20 MEQ: 1500 TABLET, EXTENDED RELEASE ORAL at 11:38

## 2018-04-17 RX ADMIN — PREGABALIN 150 MG: 75 CAPSULE ORAL at 20:10

## 2018-04-17 RX ADMIN — ASPIRIN 81 MG: 81 TABLET, COATED ORAL at 11:37

## 2018-04-17 RX ADMIN — HEPARIN SODIUM 5000 UNITS: 5000 INJECTION, SOLUTION INTRAVENOUS; SUBCUTANEOUS at 05:54

## 2018-04-17 RX ADMIN — SODIUM CHLORIDE 1000 MG: 9 INJECTION, SOLUTION INTRAVENOUS at 11:46

## 2018-04-17 RX ADMIN — POTASSIUM CHLORIDE 20 MEQ: 1500 TABLET, EXTENDED RELEASE ORAL at 20:10

## 2018-04-17 RX ADMIN — LOPERAMIDE HYDROCHLORIDE 2 MG: 2 CAPSULE ORAL at 20:26

## 2018-04-17 RX ADMIN — HYDROCODONE BITARTRATE AND ACETAMINOPHEN 1 TABLET: 5; 325 TABLET ORAL at 11:37

## 2018-04-17 RX ADMIN — HYDROCORTISONE 5 MG: 5 TABLET ORAL at 11:37

## 2018-04-17 RX ADMIN — SODIUM CHLORIDE 1000 MG: 9 INJECTION, SOLUTION INTRAVENOUS at 20:10

## 2018-04-17 ASSESSMENT — PAIN SCALES - GENERAL
PAINLEVEL_OUTOF10: 0
PAINLEVEL_OUTOF10: 1
PAINLEVEL_OUTOF10: 0
PAINLEVEL_OUTOF10: 0
PAINLEVEL_OUTOF10: 2
PAINLEVEL_OUTOF10: 2
PAINLEVEL_OUTOF10: 0

## 2018-04-17 ASSESSMENT — ENCOUNTER SYMPTOMS
DIZZINESS: 0
ABDOMINAL PAIN: 0
SEIZURES: 0
HEADACHES: 1
COUGH: 0
MYALGIAS: 1
CHILLS: 0
PALPITATIONS: 0
DOUBLE VISION: 0
NECK PAIN: 1
DEPRESSION: 0
BACK PAIN: 1
VOMITING: 0
NAUSEA: 0
BRUISES/BLEEDS EASILY: 0
DIARRHEA: 1
FEVER: 0
BLOOD IN STOOL: 0
SENSORY CHANGE: 0
SPEECH CHANGE: 1
BLURRED VISION: 0
HEMOPTYSIS: 0
SPUTUM PRODUCTION: 0
ORTHOPNEA: 0
FOCAL WEAKNESS: 0

## 2018-04-17 NOTE — OR NURSING
6397   PATIENT RECEIVED FROM CATH LAB,  S/P LACI AND BUBBLE STUDY.  REPORT RECEIVED FROM ANESTHESIA.  PATEINT IS A/A/O.    1010   REPORT CALLED TO NEURO RN.      1036  PATIENT TRANSPORTED BACK TO [facility] VIA BED,  ON 2L OXYMASK, NO MONITOR,  ACCOMPANIED BY TRANSPORT.

## 2018-04-17 NOTE — PROGRESS NOTES
[facility] Hospitalist Progress Note    Date of Service: 2018    Chief Complaint  53 y.o. female admitted 4/10/2018 for AMS presumably due to opiate OD, as she responded to narcan. Found to have CVA in addition to campylobacter infection in her stool. She has an abnormal EEG.    Interval Problem Update  Looked confused but able to answer simple questions appropriately      Consultants/Specialty  Neurology    Disposition  Rehab order placed. Family wanting to find a place closer to [place], [place] where the patient & her family live.  Will need to coordinate loop recorder and local follow up there.        Review of Systems   Constitutional: Negative for chills and fever.   HENT: Negative for hearing loss and tinnitus.    Eyes: Negative for blurred vision and double vision.   Respiratory: Negative for cough, hemoptysis and sputum production.    Cardiovascular: Negative for chest pain, palpitations and orthopnea.   Gastrointestinal: Positive for diarrhea (chronic ). Negative for abdominal pain, blood in stool, nausea and vomiting.   Genitourinary: Negative for dysuria and urgency.   Musculoskeletal: Positive for back pain, joint pain, myalgias and neck pain.        Denies pain   Skin: Negative for itching.   Neurological: Positive for speech change and headaches. Negative for dizziness, sensory change, focal weakness and seizures.   Endo/Heme/Allergies: Does not bruise/bleed easily.   Psychiatric/Behavioral: Negative for depression and suicidal ideas.        Physical Exam  Laboratory/Imaging   Hemodynamics  Temp (24hrs), Av.1 °C (98.8 °F), Min:36.1 °C (97 °F), Max:37.8 °C (100 °F)   Temperature: 37.8 °C (100 °F)  Pulse  Av.8  Min: 41  Max: 111    Blood Pressure: 126/70, NIBP: 147/73      Respiratory  Respiration: 16, Pulse Oximetry: 96 %       Fluids    Intake/Output Summary (Last 24 hours) at 18 1449  Last data filed at 18   Gross per 24 hour   Intake              940 ml   Output                 0 ml   Net              940 ml     Nutrition  Orders Placed This Encounter   Procedures   • DIET ORDER     Standing Status:   Standing     Number of Occurrences:   1     Order Specific Question:   Diet:     Answer:   Regular [1]     Order Specific Question:   Texture/Fiber modifications:     Answer:   Dysphagia 3(Mechanical Soft)specify fluid consistency(question 6) [3]     Order Specific Question:   Consistency/Fluid modifications:     Answer:   Nectar Thick [2]     Physical Exam   Constitutional: She is oriented to person, place, and time. She appears well-nourished. She is cooperative. No distress.   Thin     HENT:   Head: Normocephalic and atraumatic.   Eyes: Conjunctivae and EOM are normal. Pupils are equal, round, and reactive to light. Right eye exhibits no nystagmus. Left eye exhibits no nystagmus. Right pupil is round and reactive. Left pupil is round and reactive. Pupils are equal.   Neck: Neck supple. No edema present. No thyromegaly present.   Cardiovascular: Normal rate and regular rhythm.  Exam reveals no gallop and no friction rub.    SR on tele monitoring     Pulmonary/Chest: Effort normal and breath sounds normal. No tachypnea. No respiratory distress. She has no decreased breath sounds. She has no rhonchi.   Not on O2   Abdominal: Soft. Normal appearance. She exhibits no distension and no abdominal bruit. There is no tenderness.   Musculoskeletal: Normal range of motion. She exhibits no deformity.   Neurological: She is alert and oriented to person, place, and time. She has normal strength. No cranial nerve deficit or sensory deficit. She displays no seizure activity. GCS eye subscore is 4. GCS verbal subscore is 4. GCS motor subscore is 6.   Difficult to assess.    Skin: Skin is warm and dry. No erythema.   Psychiatric: Her speech is delayed. She is slowed and withdrawn.   Difficult to assess.    Nursing note and vitals reviewed.    Recent Labs      04/15/18   0633  04/16/18   0222  04/17/18    0400   WBC  20.5*  14.7*  13.9*   RBC  4.05*  3.79*  3.75*   HEMOGLOBIN  12.2  11.2*  11.2*   HEMATOCRIT  36.1*  35.4*  34.5*   MCV  89.1  93.4  92.0   MCH  30.1  29.6  29.9   MCHC  33.8  31.6*  32.5*   RDW  46.5  49.1  49.1   PLATELETCT  126*  134*  146*   MPV  10.9  11.7  10.9     Recent Labs      04/15/18   0633  04/16/18   0222  04/17/18   0400   SODIUM  147*  143  139   POTASSIUM  3.1*  3.7  3.7   CHLORIDE  114*  113*  111   CO2  25  22  24   GLUCOSE  117*  86  91   BUN  21  17  13   CREATININE  0.67  0.64  0.60   CALCIUM  8.6  7.8*  7.9*                      Assessment/Plan     * Stroke syndrome (CMS-HCC)   Assessment & Plan    Multifocal infarctions, largest in the left frontal lobe, left posterior occipital lobe, right parietal-occipital cortex, and right hemicerebellum, respectively  Neuro following.  Echo unremarkable. EF 70%. No bubble study. Discussed with Neurology.    Carotid US normal.  LACI w bubble study to eval pending results.              Drug overdose - unintentional- (present on admission)   Assessment & Plan    Buprenorphine patch removed PTA.   Patient was on heavy doses of narcotics at home for chronic pain vs fibromyalgia  Denies suicidal ideation or history  Weaning Norco            Seizure disorder as sequela of cerebrovascular accident (CMS-[facility])- (present on admission)   Assessment & Plan    On Keppra 1 g every 12 IV.  Neurology on board.        Chronic pain syndrome- (present on admission)   Assessment & Plan    Currently denies pain. Only required norco once overnight.  Wean Norco          Hypokalemia   Assessment & Plan    On replacement.        Aspiration pneumonia (CMS-[facility])- (present on admission)   Assessment & Plan    Treated with Unasyn.  Repeat cxr in appox 4-6 weeks to assess for resolution. Will continue to monitor respiratory status closely while inpatient.  CT chest normal        Campylobacter diarrhea- (present on admission)   Assessment & Plan    Campylobacter on cultures  from transferring facility. C-diff negative here on 4/10/18.  Still having frequent bouts of watery diarrhea.   Patient has history of chronic diarrhea for years now.  Possible contribution from opioid withdrawal.          Hyperglycemia- (present on admission)   Assessment & Plan    A1C normal at 5.4. No hx of DM.  Monitor blood glucoses.            Leukocytosis- (present on admission)   Assessment & Plan    WBC trending down.  One episode of fever 100.5  Completed Unasyn. Azithro complete 4/16.  Procalcitonin was normal.  On Cortef due to hypotension.           Elevated brain natriuretic peptide (BNP) level- (present on admission)   Assessment & Plan    BNP 8000s on arrival.   TTE showed normal diastolic function & EF 70%.  No evidence of FVO on exam.            Elevated troponin- (present on admission)   Assessment & Plan    No CP or hx of CAD.  Normal echocardiogram.  Repeat trended down.         Autonomic neuropathy- (present on admission)   Assessment & Plan    No hypotension since transfer to neuro. BP stable. Will allow for permissive hypertension in light of multiple strokes.  On low dose Cortef  Records from [facility] show longstanding midodrine, cortef/fluorinef use        Plan of care discussed with multidisciplinary team.   Quality-Core Measures   Reviewed items::  Labs reviewed, Medications reviewed, Radiology images reviewed and EKG reviewed  Bishop catheter::  No Bishop  DVT prophylaxis pharmacological::  Heparin  : JESSICA hose bilaterally.  Ulcer Prophylaxis::  Not indicated  Assessed for rehabilitation services:  Patient was assess for and/or received rehabilitation services during this hospitalization

## 2018-04-17 NOTE — CARE PLAN
Problem: Communication  Goal: The ability to communicate needs accurately and effectively will improve    Intervention: [place] patient and significant other/support system to call light to alert staff of needs  Call light in reach, calls approp      Problem: Safety  Goal: Will remain free from injury    Intervention: Provide assistance with mobility  1 person assist , calls approp

## 2018-04-17 NOTE — PROGRESS NOTES
Neurology Progress Note        Subjective:  Shasta reports things went well overnight.  She has no specific complaints.  She thinks her speech is doing ok.      Objective:  Vitals:  Vitals:    04/17/18 0954 04/17/18 1009 04/17/18 1024 04/17/18 1530   BP:    129/77   Pulse: 79 72 70 69   Resp: 16 14 16 16   Temp:    37.8 °C (100.1 °F)   SpO2: 96% 95% 96% 98%   Weight:       Height:         General:  Awake, alert and in no apparent distress, cooperative with exam  Mental Status:  Alert, oriented times, speech is largely fluent but she stumbles over words occasionally, comprehension is intact.  Cranial Nerves:  PERRL, EOMI with no nystagmus, face is symmetric, facial sensation is intact.  No dysarthria.  Motor: 5/5 throughout  Coordination:  Normal finger to nose bilaterally  Gait:  Deferred    Recent Labs      04/15/18   0633  04/16/18 0222 04/17/18   0400   WBC  20.5*  14.7*  13.9*   RBC  4.05*  3.79*  3.75*   HEMOGLOBIN  12.2  11.2*  11.2*   HEMATOCRIT  36.1*  35.4*  34.5*   MCV  89.1  93.4  92.0   MCH  30.1  29.6  29.9   RDW  46.5  49.1  49.1   PLATELETCT  126*  134*  146*   MPV  10.9  11.7  10.9   NEUTSPOLYS   --   72.70*   --    LYMPHOCYTES   --   18.10*   --    MONOCYTES   --   5.30   --    EOSINOPHILS   --   2.90   --    BASOPHILS   --   0.50   --      Recent Labs      04/15/18   0633  04/16/18 0222 04/17/18   0400   SODIUM  147*  143  139   POTASSIUM  3.1*  3.7  3.7   CHLORIDE  114*  113*  111   CO2  25  22  24   GLUCOSE  117*  86  91   BUN  21  17  13     LACI completed but report pending.     Impression: Mrs. Dunn is a 53-year-old woman with acute multifocal infarcts concerning for cardioembolic process. No embolic sources been identified so far. She also likely had seizure activity secondary to be acute infarct.        Recommendations:  1. Continue Keppra 1000 mg twice a day for seizure prevention.  2.  Continue aspirin for secondary stroke risk reduction  3.  Transesophageal echocardiogram report is  pending.  Protein c and s are normal, otherwise Hypercoagulable workup is pending.  4.  I will continue to follow. Please contact if any acute questions or concerns arise.

## 2018-04-17 NOTE — PROGRESS NOTES
Pt a/o *3, disoriented to time. VALENCIA. 1 person assist to commode, FWW ambulate briggs. CO pain throughout shift, medicated per MAR, heat packs, positive results noted. NPO since 0000 for LACI. Pt still having loose stool *3 throughout shift. Sacram red/rashy, denied cream *2 in shift. POC discussed, pt calls for help appropriately, call light in place, bed alarm on.

## 2018-04-17 NOTE — THERAPY
"Speech Language Therapy Evaluation completed to address communication  Functional Status:  Fluent aphasia characterized by word-finding deficit in conversational speech, in pt with fxnl aud comprehension.  Would benefit from SLP post acute care  Recommendations:  Continue SLP tx  Plan of Care: Will benefit from Speech Therapy 5 times per week  Post-Acute Therapy: Discharge to a transitional care facility for continued skilled therapy services; defer to info from PT/OT re safety with ambulation as well.    See \"Rehab Therapy-Acute\" Patient Summary Report for complete documentation.   "

## 2018-04-17 NOTE — PROGRESS NOTES
Neurology Progress Note        Subjective:  Following up Shasta in neurological consultation. She was admitted on April 10 with concerns of altered mental status and possible seizure activity. MRI ultimately revealed multifocal infarcts with the largest being in the left frontal region. The patient today reports her speech is gradually improving. No seizure activities were noted at the last 24 hours.    Objective:  Vitals:  Vitals:    04/15/18 2000 04/16/18 0400 04/16/18 0800 04/16/18 1600   BP: 105/52 111/67 113/63 108/55   Pulse: 79 69 72 68   Resp: 16 16 15 15   Temp: 37.2 °C (99 °F) 37.2 °C (98.9 °F) 36.8 °C (98.3 °F) 36.9 °C (98.5 °F)   SpO2: 93% 95% 91% 90%   Weight:       Height:         General:  Awake, alert and in no apparent distress, cooperative with exam  Mental Status:  Alert, oriented ×3, speech is mostly fluent although when trying to have prolonged conversations in spontaneous speech she begins to struggle. Naming is intact, repetition is intact. comprehension is intact.  Cranial Nerves:  PERRL, EOMI with no nystagmus, face is symmetric, facial sensation is intact.  No dysarthria.  Motor: 5/5 throughout    Coordination:  Normal finger to nose bilaterally  Gait:  Deferred    Recent Labs      04/15/18   0633  04/16/18   0222   WBC  20.5*  14.7*   RBC  4.05*  3.79*   HEMOGLOBIN  12.2  11.2*   HEMATOCRIT  36.1*  35.4*   MCV  89.1  93.4   MCH  30.1  29.6   RDW  46.5  49.1   PLATELETCT  126*  134*   MPV  10.9  11.7   NEUTSPOLYS   --   72.70*   LYMPHOCYTES   --   18.10*   MONOCYTES   --   5.30   EOSINOPHILS   --   2.90   BASOPHILS   --   0.50     Recent Labs      04/13/18   2233  04/15/18   0633  04/16/18   0222   SODIUM  145  147*  143   POTASSIUM  3.4*  3.1*  3.7   CHLORIDE  114*  114*  113*   CO2  22  25  22   GLUCOSE  135*  117*  86   BUN  15  21  17         Impression: Mrs. Dunn is a 53-year-old woman with acute multifocal infarcts concerning for cardioembolic process. No embolic sources been  identified so far. She also likely had seizure activity secondary to be acute infarct.      Recommendations:  1. Continue Keppra 1000 mg twice a day for seizure prevention.  2.  Continue aspirin for secondary stroke risk reduction  3.  Transesophageal echocardiogram is planned. Hypercoagulable workup is pending.  4.  I will continue to follow. Please contact if any acute questions or concerns arise.

## 2018-04-18 LAB
ANION GAP SERPL CALC-SCNC: 9 MMOL/L (ref 0–11.9)
BUN SERPL-MCNC: 9 MG/DL (ref 8–22)
CALCIUM SERPL-MCNC: 8.3 MG/DL (ref 8.5–10.5)
CHLORIDE SERPL-SCNC: 106 MMOL/L (ref 96–112)
CO2 SERPL-SCNC: 24 MMOL/L (ref 20–33)
CREAT SERPL-MCNC: 0.61 MG/DL (ref 0.5–1.4)
ERYTHROCYTE [DISTWIDTH] IN BLOOD BY AUTOMATED COUNT: 48 FL (ref 35.9–50)
GLUCOSE SERPL-MCNC: 86 MG/DL (ref 65–99)
HCT VFR BLD AUTO: 37 % (ref 37–47)
HGB BLD-MCNC: 12.4 G/DL (ref 12–16)
LV EJECT FRACT  99904: 60
MCH RBC QN AUTO: 30.3 PG (ref 27–33)
MCHC RBC AUTO-ENTMCNC: 33.5 G/DL (ref 33.6–35)
MCV RBC AUTO: 90.5 FL (ref 81.4–97.8)
PLATELET # BLD AUTO: 165 K/UL (ref 164–446)
PMV BLD AUTO: 12.3 FL (ref 9–12.9)
POTASSIUM SERPL-SCNC: 4 MMOL/L (ref 3.6–5.5)
RBC # BLD AUTO: 4.09 M/UL (ref 4.2–5.4)
SODIUM SERPL-SCNC: 139 MMOL/L (ref 135–145)
WBC # BLD AUTO: 11.9 K/UL (ref 4.8–10.8)

## 2018-04-18 PROCEDURE — 80048 BASIC METABOLIC PNL TOTAL CA: CPT

## 2018-04-18 PROCEDURE — 700105 HCHG RX REV CODE 258: Performed by: SPECIALIST

## 2018-04-18 PROCEDURE — A9270 NON-COVERED ITEM OR SERVICE: HCPCS | Performed by: INTERNAL MEDICINE

## 2018-04-18 PROCEDURE — 99233 SBSQ HOSP IP/OBS HIGH 50: CPT | Performed by: FAMILY MEDICINE

## 2018-04-18 PROCEDURE — 700111 HCHG RX REV CODE 636 W/ 250 OVERRIDE (IP): Performed by: INTERNAL MEDICINE

## 2018-04-18 PROCEDURE — A9270 NON-COVERED ITEM OR SERVICE: HCPCS | Performed by: NURSE PRACTITIONER

## 2018-04-18 PROCEDURE — 700111 HCHG RX REV CODE 636 W/ 250 OVERRIDE (IP): Performed by: SPECIALIST

## 2018-04-18 PROCEDURE — 85027 COMPLETE CBC AUTOMATED: CPT

## 2018-04-18 PROCEDURE — 92526 ORAL FUNCTION THERAPY: CPT

## 2018-04-18 PROCEDURE — 36415 COLL VENOUS BLD VENIPUNCTURE: CPT

## 2018-04-18 PROCEDURE — 700102 HCHG RX REV CODE 250 W/ 637 OVERRIDE(OP): Performed by: NURSE PRACTITIONER

## 2018-04-18 PROCEDURE — 700102 HCHG RX REV CODE 250 W/ 637 OVERRIDE(OP): Performed by: INTERNAL MEDICINE

## 2018-04-18 PROCEDURE — 770006 HCHG ROOM/CARE - MED/SURG/GYN SEMI*

## 2018-04-18 RX ADMIN — PREGABALIN 150 MG: 75 CAPSULE ORAL at 08:44

## 2018-04-18 RX ADMIN — POTASSIUM CHLORIDE 20 MEQ: 1500 TABLET, EXTENDED RELEASE ORAL at 08:44

## 2018-04-18 RX ADMIN — HYDROCODONE BITARTRATE AND ACETAMINOPHEN 1 TABLET: 5; 325 TABLET ORAL at 08:44

## 2018-04-18 RX ADMIN — SODIUM CHLORIDE 1000 MG: 9 INJECTION, SOLUTION INTRAVENOUS at 20:51

## 2018-04-18 RX ADMIN — HEPARIN SODIUM 5000 UNITS: 5000 INJECTION, SOLUTION INTRAVENOUS; SUBCUTANEOUS at 06:00

## 2018-04-18 RX ADMIN — HYDROCODONE BITARTRATE AND ACETAMINOPHEN 1 TABLET: 5; 325 TABLET ORAL at 20:06

## 2018-04-18 RX ADMIN — POTASSIUM CHLORIDE 20 MEQ: 1500 TABLET, EXTENDED RELEASE ORAL at 20:05

## 2018-04-18 RX ADMIN — SODIUM CHLORIDE 1000 MG: 9 INJECTION, SOLUTION INTRAVENOUS at 08:45

## 2018-04-18 RX ADMIN — ASPIRIN 81 MG: 81 TABLET, COATED ORAL at 08:44

## 2018-04-18 RX ADMIN — HYDROCORTISONE 5 MG: 5 TABLET ORAL at 08:44

## 2018-04-18 RX ADMIN — PREGABALIN 150 MG: 75 CAPSULE ORAL at 20:06

## 2018-04-18 RX ADMIN — HEPARIN SODIUM 5000 UNITS: 5000 INJECTION, SOLUTION INTRAVENOUS; SUBCUTANEOUS at 14:21

## 2018-04-18 RX ADMIN — HEPARIN SODIUM 5000 UNITS: 5000 INJECTION, SOLUTION INTRAVENOUS; SUBCUTANEOUS at 20:05

## 2018-04-18 RX ADMIN — HYDROCODONE BITARTRATE AND ACETAMINOPHEN 1 TABLET: 5; 325 TABLET ORAL at 14:21

## 2018-04-18 ASSESSMENT — ENCOUNTER SYMPTOMS
COUGH: 0
HEMOPTYSIS: 0
MYALGIAS: 1
BLURRED VISION: 0
DEPRESSION: 0
DOUBLE VISION: 0
DIARRHEA: 1
BRUISES/BLEEDS EASILY: 0
FEVER: 0
SPUTUM PRODUCTION: 0
SPEECH CHANGE: 1
NECK PAIN: 1
BLOOD IN STOOL: 0
PHOTOPHOBIA: 0
CHILLS: 0
DIZZINESS: 0
BACK PAIN: 1
ORTHOPNEA: 0
ABDOMINAL PAIN: 0
HEADACHES: 1
PALPITATIONS: 0
FOCAL WEAKNESS: 0
VOMITING: 0
CLAUDICATION: 0
NAUSEA: 0
SEIZURES: 0
SENSORY CHANGE: 0
WEIGHT LOSS: 0

## 2018-04-18 ASSESSMENT — PAIN SCALES - GENERAL
PAINLEVEL_OUTOF10: 7
PAINLEVEL_OUTOF10: 2

## 2018-04-18 ASSESSMENT — PATIENT HEALTH QUESTIONNAIRE - PHQ9
2. FEELING DOWN, DEPRESSED, IRRITABLE, OR HOPELESS: NOT AT ALL
SUM OF ALL RESPONSES TO PHQ9 QUESTIONS 1 AND 2: 0
1. LITTLE INTEREST OR PLEASURE IN DOING THINGS: NOT AT ALL

## 2018-04-18 NOTE — CARE PLAN
Problem: Communication  Goal: The ability to communicate needs accurately and effectively will improve    Intervention: [place] patient and significant other/support system to call light to alert staff of needs  Pt calls for help appropriately      Problem: Safety  Goal: Will remain free from injury    Intervention: Provide assistance with mobility  standby by assist, calls for help appropriately

## 2018-04-18 NOTE — THERAPY
"Speech Language Therapy dysphagia treatment completed.   Functional Status:  Fxnl toleration of D3/solids, however pt with intermittent gurgly vocal quality with sips of thin; needs improved self monitor prior to upgrade to thins.    Recommendations: Continue current diet.   Plan of Care: Will benefit from Speech Therapy 3 times per week  Post-Acute Therapy: Discharge to a transitional care facility for continued skilled therapy services.    See \"Rehab Therapy-Acute\" Patient Summary Report for complete documentation.     "

## 2018-04-18 NOTE — DISCHARGE PLANNING
NAZANIN spoke with daughter, Ling to inform her that pt has been approved for a rehab [facility] to [facility] pending acceptance to facility and her verbal auth.     Ling wanting to call her sister to discuss plan before providing consent.     Daughter to call NAZANIN back today or tomorrow morning.

## 2018-04-18 NOTE — DISCHARGE PLANNING
Pt d/o at this time. SW received VM from daughter offering verbal auth for rehab referral.     SW faxed choice to [facility].

## 2018-04-18 NOTE — PROGRESS NOTES
"Pt a/o *3, disoriented to time. VALENCIA. 1 person assist to commode, FWW ambulate briggs. Pt still having loose stool *3 throughout shift, however said it was \"more formed and getting better.\" Sacram red/rashy, applied cream. POC discussed, pt calls for help appropriately, call light in place, bed alarm on.   "

## 2018-04-18 NOTE — PROGRESS NOTES
[facility] Hospitalist Progress Note    Date of Service: 2018    Chief Complaint  53 y.o. female admitted 4/10/2018 for AMS presumably due to opiate OD, as she responded to narcan. Found to have CVA in addition to campylobacter infection in her stool. She has an abnormal EEG.    Interval Problem Update  Laying comfortably in bed. Has no complaints to report. Answer simple questions and follows simple commands appropriately.    Consultants/Specialty  Neurology    Disposition  Rehab order placed. Family wanting to find a place closer to [place], [place] where the patient & her family live.          Review of Systems   Constitutional: Negative for chills, fever and weight loss.   HENT: Negative for ear pain, hearing loss and tinnitus.    Eyes: Negative for blurred vision, double vision and photophobia.   Respiratory: Negative for cough, hemoptysis and sputum production.    Cardiovascular: Negative for chest pain, palpitations, orthopnea and claudication.   Gastrointestinal: Positive for diarrhea (chronic ). Negative for abdominal pain, blood in stool, nausea and vomiting.   Genitourinary: Negative for dysuria, frequency and urgency.   Musculoskeletal: Positive for back pain, joint pain, myalgias and neck pain.        Denies pain   Skin: Negative for rash.   Neurological: Positive for speech change (Resolving ) and headaches. Negative for dizziness, sensory change, focal weakness and seizures.   Endo/Heme/Allergies: Does not bruise/bleed easily.   Psychiatric/Behavioral: Negative for depression and suicidal ideas.        Physical Exam  Laboratory/Imaging   Hemodynamics  Temp (24hrs), Av °C (98.6 °F), Min:36.5 °C (97.7 °F), Max:37.8 °C (100.1 °F)   Temperature: 36.8 °C (98.2 °F)  Pulse  Av.6  Min: 41  Max: 111    Blood Pressure: 119/65      Respiratory  Respiration: 17, Pulse Oximetry: 90 %       Fluids    Intake/Output Summary (Last 24 hours) at 18 1358  Last data filed at 18 0300   Gross per 24 hour    Intake              940 ml   Output              500 ml   Net              440 ml     Nutrition  Orders Placed This Encounter   Procedures   • DIET ORDER     Standing Status:   Standing     Number of Occurrences:   1     Order Specific Question:   Diet:     Answer:   Regular [1]     Order Specific Question:   Texture/Fiber modifications:     Answer:   Dysphagia 3(Mechanical Soft)specify fluid consistency(question 6) [3]     Order Specific Question:   Consistency/Fluid modifications:     Answer:   Nectar Thick [2]     Physical Exam   Constitutional: She is oriented to person, place, and time. She appears well-nourished. She is cooperative. No distress.   Thin     HENT:   Head: Normocephalic and atraumatic.   Eyes: Conjunctivae and EOM are normal. Pupils are equal, round, and reactive to light. No scleral icterus. Right eye exhibits no nystagmus. Left eye exhibits no nystagmus. Right pupil is round and reactive. Left pupil is round and reactive. Pupils are equal.   Neck: Neck supple. No tracheal deviation and no edema present. No thyromegaly present.   Cardiovascular: Normal rate and regular rhythm.  Exam reveals no gallop and no friction rub.    SR on tele monitoring     Pulmonary/Chest: Effort normal and breath sounds normal. No tachypnea. No respiratory distress. She has no decreased breath sounds. She has no rhonchi.   Not on O2   Abdominal: Soft. Normal appearance. She exhibits no distension and no abdominal bruit. There is no tenderness.   Musculoskeletal: Normal range of motion. She exhibits no deformity.   Neurological: She is alert and oriented to person, place, and time. She has normal strength. No cranial nerve deficit or sensory deficit. She displays no seizure activity. GCS eye subscore is 4. GCS verbal subscore is 4. GCS motor subscore is 6.   Difficult to assess.    Skin: Skin is warm and dry. She is not diaphoretic. No erythema.   Psychiatric: Her speech is delayed. She is slowed and withdrawn.    Nursing note and vitals reviewed.    Recent Labs      04/16/18 0222  04/17/18   0400  04/18/18   0244   WBC  14.7*  13.9*  11.9*   RBC  3.79*  3.75*  4.09*   HEMOGLOBIN  11.2*  11.2*  12.4   HEMATOCRIT  35.4*  34.5*  37.0   MCV  93.4  92.0  90.5   MCH  29.6  29.9  30.3   MCHC  31.6*  32.5*  33.5*   RDW  49.1  49.1  48.0   PLATELETCT  134*  146*  165   MPV  11.7  10.9  12.3     Recent Labs      04/16/18 0222 04/17/18   0400  04/18/18   0244   SODIUM  143  139  139   POTASSIUM  3.7  3.7  4.0   CHLORIDE  113*  111  106   CO2  22  24  24   GLUCOSE  86  91  86   BUN  17  13  9   CREATININE  0.64  0.60  0.61   CALCIUM  7.8*  7.9*  8.3*                      Assessment/Plan     * Stroke syndrome (CMS-HCC)   Assessment & Plan    Multifocal infarctions, largest in the left frontal lobe, left posterior occipital lobe, right parietal-occipital cortex, and right hemicerebellum, respectively  Neuro following.  Echo unremarkable. EF 70%. No bubble study. Discussed with Neurology.    Carotid US normal.  LACI w bubble study to eval pending results.              Drug overdose - unintentional- (present on admission)   Assessment & Plan    Buprenorphine patch removed PTA.   Patient was on heavy doses of narcotics at home for chronic pain vs fibromyalgia  Denies suicidal ideation or history  Wean down narcotic as tolerated.             Seizure disorder as sequela of cerebrovascular accident (CMS-HCC)- (present on admission)   Assessment & Plan    On Keppra 1 g every 12 Iv.  On seizure precautions.  Neurology on board.        Chronic pain syndrome- (present on admission)   Assessment & Plan    Currently denies pain. Only required norco once overnight.  Wean Norco          Hypokalemia   Assessment & Plan    On replacement.        Aspiration pneumonia (CMS-HCC)- (present on admission)   Assessment & Plan    Treated with Unasyn.  Repeat cxr in appox 4-6 weeks to assess for resolution.   CT chest normal        Campylobacter diarrhea-  (present on admission)   Assessment & Plan    Campylobacter on cultures from transferring facility. C-diff negative here on 4/10/18.  Still having frequent bouts of watery diarrhea.   Patient has history of chronic diarrhea for years now.  Possible contribution from opioid withdrawal.          Hyperglycemia- (present on admission)   Assessment & Plan    A1C normal at 5.4. No hx of Dm.  This likely steroid induced.   Monitor blood glucoses.            Leukocytosis- (present on admission)   Assessment & Plan    WBC trending down.    Completed Unasyn and Azithro complete 4/16.  Procalcitonin was normal.  Clinically with no signs with active infection.   On Cortef due to hypotension which might contribute to leucocytosis.           Elevated brain natriuretic peptide (BNP) level- (present on admission)   Assessment & Plan    BNP 8000s on arrival.   TTE showed normal diastolic function & EF 70%.  No evidence of FVO on exam.            Elevated troponin- (present on admission)   Assessment & Plan    No CP or hx of CAD.  Normal echocardiogram.  Repeat trended down.         Autonomic neuropathy- (present on admission)   Assessment & Plan    Now normotensive. BP stable. Allow for permissive hypertension in light of multiple strokes.  On low dose Cortef  Midodrine on hold as patient is normotensive.         Plan of care discussed with multidisciplinary team.   Quality-Core Measures   Reviewed items::  Labs reviewed, Medications reviewed, Radiology images reviewed and EKG reviewed  Bishop catheter::  No Bishop  DVT prophylaxis pharmacological::  Heparin  : JESSICA hose bilaterally.  Ulcer Prophylaxis::  Not indicated  Assessed for rehabilitation services:  Patient was assess for and/or received rehabilitation services during this hospitalization

## 2018-04-18 NOTE — CARE PLAN
Problem: Nutritional:  Goal: Achieve adequate nutritional intake  Patient will tolerate po diet with at least 50% intake of meals.   Outcome: PROGRESSING SLOWER THAN EXPECTED    -Per chart review of ADL's, this pt has been consuming 0% of meals.  -Attempted to see pt at bedside though she was asleep and did not rouse to name.   -Noted Smoothies piled up at bedside, will d/c them.  -Spoke with RN who stated pt is on medications that make her very sleepy.     Added supplements TID (Magic Cups as pt previously did not like boost VHC) to promote PO adequacy.    Note: This is day 9 of inadequate PO Intake/ nutrition. Please consider alternate source of nutrition.   RD Following

## 2018-04-19 ENCOUNTER — HOSPITAL ENCOUNTER (INPATIENT)
Facility: REHABILITATION | Age: 53
End: 2018-04-19
Attending: PHYSICAL MEDICINE & REHABILITATION | Admitting: PHYSICAL MEDICINE & REHABILITATION
Payer: COMMERCIAL

## 2018-04-19 PROBLEM — D64.9 NORMOCYTIC ANEMIA: Status: ACTIVE | Noted: 2018-04-19

## 2018-04-19 LAB
ANION GAP SERPL CALC-SCNC: 6 MMOL/L (ref 0–11.9)
BUN SERPL-MCNC: 10 MG/DL (ref 8–22)
CALCIUM SERPL-MCNC: 8.3 MG/DL (ref 8.5–10.5)
CHLORIDE SERPL-SCNC: 105 MMOL/L (ref 96–112)
CO2 SERPL-SCNC: 28 MMOL/L (ref 20–33)
CREAT SERPL-MCNC: 0.7 MG/DL (ref 0.5–1.4)
DSDNA AB TITR SER CLIF: ABNORMAL {TITER}
ENA SM IGG SER-ACNC: 0 AU/ML (ref 0–40)
ENA SS-B IGG SER IA-ACNC: 1 AU/ML (ref 0–40)
ERYTHROCYTE [DISTWIDTH] IN BLOOD BY AUTOMATED COUNT: 50 FL (ref 35.9–50)
F5 P.R506Q BLD/T QL: NEGATIVE
GLUCOSE SERPL-MCNC: 100 MG/DL (ref 65–99)
HCT VFR BLD AUTO: 40.6 % (ref 37–47)
HGB BLD-MCNC: 13 G/DL (ref 12–16)
MCH RBC QN AUTO: 29.5 PG (ref 27–33)
MCHC RBC AUTO-ENTMCNC: 32 G/DL (ref 33.6–35)
MCV RBC AUTO: 92.1 FL (ref 81.4–97.8)
NUCLEAR IGG SER QL IA: DETECTED
NUCLEAR IGG TITR SER IF: ABNORMAL {TITER}
PLATELET # BLD AUTO: 235 K/UL (ref 164–446)
PMV BLD AUTO: 11.6 FL (ref 9–12.9)
POTASSIUM SERPL-SCNC: 4 MMOL/L (ref 3.6–5.5)
RBC # BLD AUTO: 4.41 M/UL (ref 4.2–5.4)
SODIUM SERPL-SCNC: 139 MMOL/L (ref 135–145)
SSA52 R0ENA AB IGG Q0420: 6 AU/ML (ref 0–40)
SSA60 R0ENA AB IGG Q0419: 0 AU/ML (ref 0–40)
U1 SNRNP IGG SER QL: 0 AU/ML (ref 0–40)
WBC # BLD AUTO: 11.5 K/UL (ref 4.8–10.8)

## 2018-04-19 PROCEDURE — 700102 HCHG RX REV CODE 250 W/ 637 OVERRIDE(OP): Performed by: FAMILY MEDICINE

## 2018-04-19 PROCEDURE — 700105 HCHG RX REV CODE 258: Performed by: SPECIALIST

## 2018-04-19 PROCEDURE — 97535 SELF CARE MNGMENT TRAINING: CPT

## 2018-04-19 PROCEDURE — 700102 HCHG RX REV CODE 250 W/ 637 OVERRIDE(OP): Performed by: HOSPITALIST

## 2018-04-19 PROCEDURE — A9270 NON-COVERED ITEM OR SERVICE: HCPCS | Performed by: FAMILY MEDICINE

## 2018-04-19 PROCEDURE — 85027 COMPLETE CBC AUTOMATED: CPT

## 2018-04-19 PROCEDURE — 700102 HCHG RX REV CODE 250 W/ 637 OVERRIDE(OP): Performed by: INTERNAL MEDICINE

## 2018-04-19 PROCEDURE — 770006 HCHG ROOM/CARE - MED/SURG/GYN SEMI*

## 2018-04-19 PROCEDURE — 80048 BASIC METABOLIC PNL TOTAL CA: CPT

## 2018-04-19 PROCEDURE — A9270 NON-COVERED ITEM OR SERVICE: HCPCS | Performed by: NURSE PRACTITIONER

## 2018-04-19 PROCEDURE — A9270 NON-COVERED ITEM OR SERVICE: HCPCS | Performed by: INTERNAL MEDICINE

## 2018-04-19 PROCEDURE — 700102 HCHG RX REV CODE 250 W/ 637 OVERRIDE(OP): Performed by: NURSE PRACTITIONER

## 2018-04-19 PROCEDURE — 97116 GAIT TRAINING THERAPY: CPT

## 2018-04-19 PROCEDURE — A9270 NON-COVERED ITEM OR SERVICE: HCPCS | Performed by: HOSPITALIST

## 2018-04-19 PROCEDURE — 700111 HCHG RX REV CODE 636 W/ 250 OVERRIDE (IP): Performed by: INTERNAL MEDICINE

## 2018-04-19 PROCEDURE — 97530 THERAPEUTIC ACTIVITIES: CPT

## 2018-04-19 PROCEDURE — 99232 SBSQ HOSP IP/OBS MODERATE 35: CPT | Performed by: FAMILY MEDICINE

## 2018-04-19 PROCEDURE — 700111 HCHG RX REV CODE 636 W/ 250 OVERRIDE (IP): Performed by: SPECIALIST

## 2018-04-19 PROCEDURE — 700105 HCHG RX REV CODE 258: Performed by: HOSPITALIST

## 2018-04-19 PROCEDURE — 36415 COLL VENOUS BLD VENIPUNCTURE: CPT

## 2018-04-19 RX ORDER — MIDODRINE HYDROCHLORIDE 5 MG/1
5 TABLET ORAL
Status: DISCONTINUED | OUTPATIENT
Start: 2018-04-19 | End: 2018-04-21

## 2018-04-19 RX ORDER — MIDODRINE HYDROCHLORIDE 5 MG/1
10 TABLET ORAL 4 TIMES DAILY
Status: DISCONTINUED | OUTPATIENT
Start: 2018-04-19 | End: 2018-04-19

## 2018-04-19 RX ORDER — SODIUM CHLORIDE 9 MG/ML
INJECTION, SOLUTION INTRAVENOUS ONCE
Status: COMPLETED | OUTPATIENT
Start: 2018-04-19 | End: 2018-04-19

## 2018-04-19 RX ORDER — LEVETIRACETAM 500 MG/1
1000 TABLET ORAL 2 TIMES DAILY
Status: DISCONTINUED | OUTPATIENT
Start: 2018-04-19 | End: 2018-04-26

## 2018-04-19 RX ADMIN — PREGABALIN 150 MG: 75 CAPSULE ORAL at 21:42

## 2018-04-19 RX ADMIN — HYDROCORTISONE 5 MG: 5 TABLET ORAL at 08:36

## 2018-04-19 RX ADMIN — SODIUM CHLORIDE: 9 INJECTION, SOLUTION INTRAVENOUS at 00:52

## 2018-04-19 RX ADMIN — HYDROCODONE BITARTRATE AND ACETAMINOPHEN 1 TABLET: 5; 325 TABLET ORAL at 08:36

## 2018-04-19 RX ADMIN — KETOROLAC TROMETHAMINE 10 MG: 10 TABLET, FILM COATED ORAL at 18:53

## 2018-04-19 RX ADMIN — HEPARIN SODIUM 5000 UNITS: 5000 INJECTION, SOLUTION INTRAVENOUS; SUBCUTANEOUS at 14:39

## 2018-04-19 RX ADMIN — HYDROCODONE BITARTRATE AND ACETAMINOPHEN 1 TABLET: 5; 325 TABLET ORAL at 21:42

## 2018-04-19 RX ADMIN — POTASSIUM CHLORIDE 20 MEQ: 1500 TABLET, EXTENDED RELEASE ORAL at 08:37

## 2018-04-19 RX ADMIN — KETOROLAC TROMETHAMINE 10 MG: 10 TABLET, FILM COATED ORAL at 03:33

## 2018-04-19 RX ADMIN — SODIUM CHLORIDE 1000 MG: 9 INJECTION, SOLUTION INTRAVENOUS at 08:36

## 2018-04-19 RX ADMIN — MICONAZOLE NITRATE: 20 CREAM TOPICAL at 14:00

## 2018-04-19 RX ADMIN — LEVETIRACETAM 1000 MG: 500 TABLET, FILM COATED ORAL at 21:42

## 2018-04-19 RX ADMIN — HEPARIN SODIUM 5000 UNITS: 5000 INJECTION, SOLUTION INTRAVENOUS; SUBCUTANEOUS at 21:45

## 2018-04-19 RX ADMIN — HEPARIN SODIUM 5000 UNITS: 5000 INJECTION, SOLUTION INTRAVENOUS; SUBCUTANEOUS at 05:18

## 2018-04-19 RX ADMIN — ASPIRIN 81 MG: 81 TABLET, COATED ORAL at 08:36

## 2018-04-19 RX ADMIN — POTASSIUM CHLORIDE 20 MEQ: 1500 TABLET, EXTENDED RELEASE ORAL at 21:00

## 2018-04-19 RX ADMIN — MIDODRINE HYDROCHLORIDE 5 MG: 5 TABLET ORAL at 16:47

## 2018-04-19 RX ADMIN — PREGABALIN 150 MG: 75 CAPSULE ORAL at 08:36

## 2018-04-19 RX ADMIN — MICONAZOLE NITRATE: 20 CREAM TOPICAL at 21:45

## 2018-04-19 RX ADMIN — HYDROCODONE BITARTRATE AND ACETAMINOPHEN 1 TABLET: 5; 325 TABLET ORAL at 14:39

## 2018-04-19 ASSESSMENT — PAIN SCALES - GENERAL
PAINLEVEL_OUTOF10: 5
PAINLEVEL_OUTOF10: 6
PAINLEVEL_OUTOF10: 4
PAINLEVEL_OUTOF10: 4
PAINLEVEL_OUTOF10: 6
PAINLEVEL_OUTOF10: 6
PAINLEVEL_OUTOF10: 4

## 2018-04-19 ASSESSMENT — ENCOUNTER SYMPTOMS
HEMOPTYSIS: 0
PHOTOPHOBIA: 0
DIZZINESS: 0
DOUBLE VISION: 0
MYALGIAS: 1
DIARRHEA: 0
VOMITING: 0
NECK PAIN: 1
ORTHOPNEA: 0
FEVER: 0
BACK PAIN: 1
SPUTUM PRODUCTION: 0
ABDOMINAL PAIN: 0
TREMORS: 0
BRUISES/BLEEDS EASILY: 0
CHILLS: 0
SENSORY CHANGE: 0
DEPRESSION: 0
SHORTNESS OF BREATH: 0
HEADACHES: 0
PALPITATIONS: 0
NAUSEA: 0
EYE PAIN: 0

## 2018-04-19 ASSESSMENT — COGNITIVE AND FUNCTIONAL STATUS - GENERAL
CLIMB 3 TO 5 STEPS WITH RAILING: A LITTLE
HELP NEEDED FOR BATHING: A LOT
SUGGESTED CMS G CODE MODIFIER DAILY ACTIVITY: CK
DRESSING REGULAR LOWER BODY CLOTHING: A LOT
PERSONAL GROOMING: A LITTLE
DAILY ACTIVITIY SCORE: 15
TOILETING: A LOT
DRESSING REGULAR UPPER BODY CLOTHING: A LITTLE
SUGGESTED CMS G CODE MODIFIER MOBILITY: CI
EATING MEALS: A LITTLE
MOBILITY SCORE: 23

## 2018-04-19 ASSESSMENT — GAIT ASSESSMENTS
DISTANCE (FEET): 200
DEVIATION: DECREASED BASE OF SUPPORT;DECREASED HEEL STRIKE;DECREASED TOE OFF
ASSISTIVE DEVICE: FRONT WHEEL WALKER
GAIT LEVEL OF ASSIST: STAND BY ASSIST

## 2018-04-19 NOTE — CONSULTS
Physical Medicine and Rehabilitation Consultation    Date of Consultation: 4/19/2018  Consulting provider: BRANDI Xie  Reason for consultation: assess for acute inpatient rehab appropriateness  Chief complaint: language difficulties    HPI: The patient is a 53 y.o. female with a past medical history of opioid use, chronic pain, hypotension, admitted on 4/10/2018  6:43 AM with altered mental status. She was found in bed by her significant other with altered mental status and incontinence of stool and urine. She was given Narcan at outside hospital with improvement. She recently received another dose here at this facility with continued improvement in her mental status. She was admitted and found also to have elevated troponin and elevated BNP as well as leukocytosis. Neurology was consulted, with EEG showing probable focal seizure on the left for which she was started on Keppra. Patient found to have diarrhea from Campylobacter for which she was started on antibiotics.     Psychiatry was consulted as patient will was barely communicative. MRI with multifocal areas of acute infarction in the left frontal lobe, left posterior occipital lobe, right parietal occipital lobe, and right deidra-cerebellum. Speech difficulties thought to be from expressive aphasia. Echocardiogram with ejection fraction of 70%. Concern for further seizure activity with secondary EEG showing focal irritability for which her Keppra dose was increased. Given unclear etiology of stroke, she had a transesophageal echo showed a patent foramen ovale, for which the patient is currently considering the option of surgical closure versus treatment with aspirin per neurology's recommendation that the effectiveness of PFO closure and reducing future strokes is mixed. She also had a a hypercoagulable workup with positive CARMEN but all other test negative. Neurology has signed off.    The patient currently reports her language is improving though she  still has word finding deficits. She denies any visual changes, trouble swallowing, focal weakness, or numbness and tingling. No issues with bowel bladder.    ROS:  no F/C, N/V, HA, vision changes/dizziness, CP/SOB, abd pain/constipation, diarrhea, dysuria/frequency/urgency, bowel/bladder incontinence, calf pain/swelling.    Social Hx:  Pre-morbidly, this patient lived in a single level home with no steps to enter, with her cousin. Patient lives in [place] and sees most of her care at [facility]. He reports her niece may be able to assist her at discharge by driving her to outpatient speech.  Employment: Unable to answer due to her expressive aphasia    Prior level of function:   Independent, , worked    Current level of function:  The patient was evaluated by acute care Physical Therapy, Occupational Therapy and Speech Language Pathology; currently requiring stand-by assistance for mobility and contact-guard to stand by assistance for ADLs, also with ongoing cognitive, speech and swallowing deficits. She is on a regular diet with nectars.    PMH:  Past Medical History:   Diagnosis Date   • Chronic pain    • Hypotension    • Opiate use        PSH:  History reviewed. No pertinent surgical history.    FHX:  History reviewed. No pertinent family history.    Medications:  Current Facility-Administered Medications   Medication Dose   • midodrine (PROAMATINE) tablet 5 mg  5 mg   • levETIRAcetam (KEPPRA) tablet 1,000 mg  1,000 mg   • HYDROcodone-acetaminophen (NORCO) 5-325 MG per tablet 1 Tab  1 Tab   • ketorolac (TORADOL) tablet 10 mg  10 mg   • potassium chloride SA (Kdur) tablet 20 mEq  20 mEq   • pregabalin (LYRICA) capsule 150 mg  150 mg   • loperamide (IMODIUM) capsule 2 mg  2 mg   • LORazepam (ATIVAN) injection 0.5 mg  0.5 mg   • miconazole 2%-zinc oxide (TOM) topical cream     • acetaminophen (TYLENOL) tablet 650 mg  650 mg   • labetalol (NORMODYNE,TRANDATE) injection 5 mg  5 mg   • aspirin EC  "(ECOTRIN) tablet 81 mg  81 mg   • heparin injection 5,000 Units  5,000 Units   • hydrocortisone (CORTEF) tablet 5 mg  5 mg   • Respiratory Care per Protocol         Allergies:  Allergies   Allergen Reactions   • Sulfamethoxazole-Trimethoprim      Other reaction(s): *N/A*  Muscle cramps       Physical Exam:  Vitals: Blood pressure 113/63, pulse 73, temperature 37.2 °C (98.9 °F), resp. rate 16, height 1.676 m (5' 6\"), weight 54.5 kg (120 lb 2.4 oz), SpO2 96 %, not currently breastfeeding.  Gen: NAD, looks younger than her age  HEENT: NC/AT, PERRLA, moist mucous membranes  Cardio: RRR, no murmurs, no peripheral edema  Pulm: CTAB, with normal respiratory effort, on room air  Abd: Soft NTND, active bowel sounds  Ext: No calf tenderness. No clubbing/cyanosis.    Neuro:  Mental status: alert  Speech: expressive aphasia with word finding deficits, no dysarthria    CRANIAL NERVES:  2,3: visual acuity grossly intact, PERRL  3,4,6: EOMI bilaterally, no nystagmus or diplopia  5: sensation intact to light touch bilaterally and symmetric  7: no facial asymmetry  8: hearing grossly intact  9,10: symmetric palate elevation  11: SCM/Trapezius strength 5/5 bilaterally  12: tongue protrudes midline    Motor:  5/5 throughout UE and LE      Sensory:   intact to light touch through out    DTRs: 2+ in bilateral biceps, triceps, brachioradialis, 0+ in bilateral patellar and achilles tendons  No clonus at bilateral ankles  Negative babinski b/l  Negative Michael b/l     Labs:  Recent Labs      04/17/18   0400  04/18/18   0244  04/19/18   0318   RBC  3.75*  4.09*  4.41   HEMOGLOBIN  11.2*  12.4  13.0   HEMATOCRIT  34.5*  37.0  40.6   PLATELETCT  146*  165  235     Recent Labs      04/17/18   0400  04/18/18   0244  04/19/18   0856   SODIUM  139  139  139   POTASSIUM  3.7  4.0  4.0   CHLORIDE  111  106  105   CO2  24  24  28   GLUCOSE  91  86  100*   BUN  13  9  10   CREATININE  0.60  0.61  0.70   CALCIUM  7.9*  8.3*  8.3*     Recent Results " (from the past 24 hour(s))   CBC WITHOUT DIFFERENTIAL    Collection Time: 04/19/18  3:18 AM   Result Value Ref Range    WBC 11.5 (H) 4.8 - 10.8 K/uL    RBC 4.41 4.20 - 5.40 M/uL    Hemoglobin 13.0 12.0 - 16.0 g/dL    Hematocrit 40.6 37.0 - 47.0 %    MCV 92.1 81.4 - 97.8 fL    MCH 29.5 27.0 - 33.0 pg    MCHC 32.0 (L) 33.6 - 35.0 g/dL    RDW 50.0 35.9 - 50.0 fL    Platelet Count 235 164 - 446 K/uL    MPV 11.6 9.0 - 12.9 fL   BASIC METABOLIC PANEL    Collection Time: 04/19/18  8:56 AM   Result Value Ref Range    Sodium 139 135 - 145 mmol/L    Potassium 4.0 3.6 - 5.5 mmol/L    Chloride 105 96 - 112 mmol/L    Co2 28 20 - 33 mmol/L    Glucose 100 (H) 65 - 99 mg/dL    Bun 10 8 - 22 mg/dL    Creatinine 0.70 0.50 - 1.40 mg/dL    Calcium 8.3 (L) 8.5 - 10.5 mg/dL    Anion Gap 6.0 0.0 - 11.9   ESTIMATED GFR    Collection Time: 04/19/18  8:56 AM   Result Value Ref Range    GFR If African American >60 >60 mL/min/1.73 m 2    GFR If Non African American >60 >60 mL/min/1.73 m 2       ASSESSMENT:    Patient is a 53 y.o. female admitted with altered mental status and speech difficulties initially thought due to opioid overdose but patient found to have multiple cardioembolic strokes with finding of patent foramen ovale (treated with aspirin), as well as seizure tendency for which she was started on Keppra. Patient's expressive aphasia has greatly improved.     Patient with multiple co-morbidities(including but not limited to hypotension, seizure disorder, diarrhea); with cognitive deficits and very mild functional deficits in mobility/self-care/swallowing/speech, and Moderate de-conditioning.     Pre-morbidly, this patient lived in a single level home with no steps to enter, with her cousin. Patient lives in [facility] and sees most of her care at [facility]. He reports her niece may be able to assist her at discharge by driving her to outpatient appointments.     Prior level of function:   Independent, , worked    Current  level of function:  The patient was evaluated by acute care Physical Therapy, Occupational Therapy and Speech Language Pathology; currently requiring stand-by assistance for mobility and contact-guard to stand by assistance for ADLs, also with ongoing cognitive, speech and swallowing deficits. She is on a regular diet with nectars.    The patient is not a good candidate for an acute inpatient rehabilitation program as she is too high level functionally with physical and occupational therapy at standby to contact-guard assist.    If she has discharge support, recommend discharge home with outpatient speech therapy.     Thank you for allowing me to participate in the care of this patient.    Mulu Amador M.D.  Physical Medicine and Rehabilitation

## 2018-04-19 NOTE — PROGRESS NOTES
Neurology Progress Note        Subjective:  Shasta is tired today.  She states she would prefer to stay in the [place] area to rehabilitate.    Objective:  Vitals:  Vitals:    04/18/18 1600 04/18/18 2000 04/19/18 0400 04/19/18 0800   BP: 123/78 (!) 99/56 123/63 113/63   Pulse: 65 89 (!) 12 73   Resp: 16 16 18 16   Temp: 36.7 °C (98 °F) 37.2 °C (98.9 °F) 37.1 °C (98.7 °F) 37.2 °C (98.9 °F)   SpO2: 91% 94% 92% 96%   Weight:       Height:         General:  Awake, alert and in no apparent distress, cooperative with exam  Mental Status:  Alert, oriented times, speech is largely fluent but she stumbles over words occasionally, comprehension is intact.  Cranial Nerves:  PERRL, EOMI with no nystagmus, face is symmetric, facial sensation is intact.  No dysarthria.  Motor: 5/5 throughout, no drift  Coordination:  Normal finger to nose bilaterally  Gait:  Deferred       Recent Labs      04/17/18   0400  04/18/18   0244  04/19/18   0318   WBC  13.9*  11.9*  11.5*   RBC  3.75*  4.09*  4.41   HEMOGLOBIN  11.2*  12.4  13.0   HEMATOCRIT  34.5*  37.0  40.6   MCV  92.0  90.5  92.1   MCH  29.9  30.3  29.5   RDW  49.1  48.0  50.0   PLATELETCT  146*  165  235   MPV  10.9  12.3  11.6     Recent Labs      04/17/18   0400  04/18/18   0244  04/19/18   0856   SODIUM  139  139  139   POTASSIUM  3.7  4.0  4.0   CHLORIDE  111  106  105   CO2  24  24  28   GLUCOSE  91  86  100*   BUN  13  9  10            Impression: Mrs. Dunn is a 53-year-old woman with acute multifocal infarcts concerning for cardioembolic process. No embolic sources been identified so far. She also likely had seizure activity secondary to be acute infarct. Her LACI has revealed a PFO. Also her CARMEN came back positive for 1:640 today.        Recommendations:  1. Continue Keppra 1000 mg twice a day for seizure prevention.  2.  Continue aspirin for secondary stroke risk reduction  3.  Data on effectiveness of PFO closure in reducing future strokes is mixed.  I discussed the option of  closure versus continuing aspirin treatment the patient and she'll consider her options.  She has no evidence of swelling or pain in the legs to suggest a DVT.  4.  I will defer to the medicine team and further testing is needed. Positive CARMEN. Anticardiolipin antibodies were negative so I am doubtful this would be related to her stroke.  5.  The neurology service will sign off, please call if any further questions or concerns arise.

## 2018-04-19 NOTE — THERAPY
"Pt w/impaired balance, gait, safety awarenss, coordinatiom, and activity tolerance. Pt limited by dizziness. Pt demonstrated an antalgic gait pattern w/occasional veering and decreased DEQUAN, requiring verbal cuing. Pt would benefit from further acute PT txs to progress towards goals and independence. Pt should be able to return home w/family support and HH or outpt services when medically cleared and cont to progress.    Physical Therapy Treatment completed.   Bed Mobility:  Supine to Sit: Modified Independent  Transfers: Sit to Stand: Supervised  Gait: Level Of Assist: Stand by Assist with Front-Wheel Walker       Plan of Care: Will benefit from Physical Therapy 3 times per week    See \"Rehab Therapy-Acute\" Patient Summary Report for complete documentation.       "

## 2018-04-19 NOTE — PROGRESS NOTES
Neurology Progress Note        Subjective:  Shasta has no complaints today. She denies any new neurological symptoms. She denies any chest pain or shortness of breath.    Objective:  Vitals:  Vitals:    04/17/18 2000 04/18/18 0400 04/18/18 0800 04/18/18 1600   BP: 120/68 127/71 119/65 123/78   Pulse: 88 74 72 65   Resp: 14 16 17 16   Temp: 36.5 °C (97.7 °F) 36.8 °C (98.3 °F) 36.8 °C (98.2 °F) 36.7 °C (98 °F)   SpO2: 91% 89% 90% 91%   Weight:       Height:         General:  Awake, alert and in no apparent distress, cooperative with exam  Mental Status:  Alert, oriented times, speech is largely fluent but she stumbles over words occasionally, comprehension is intact.  Cranial Nerves:  PERRL, EOMI with no nystagmus, face is symmetric, facial sensation is intact.  No dysarthria.  Motor: 5/5 throughout  Coordination:  Normal finger to nose bilaterally  Gait:  Deferred    Recent Labs      04/16/18 0222 04/17/18 0400 04/18/18   0244   WBC  14.7*  13.9*  11.9*   RBC  3.79*  3.75*  4.09*   HEMOGLOBIN  11.2*  11.2*  12.4   HEMATOCRIT  35.4*  34.5*  37.0   MCV  93.4  92.0  90.5   MCH  29.6  29.9  30.3   RDW  49.1  49.1  48.0   PLATELETCT  134*  146*  165   MPV  11.7  10.9  12.3   NEUTSPOLYS  72.70*   --    --    LYMPHOCYTES  18.10*   --    --    MONOCYTES  5.30   --    --    EOSINOPHILS  2.90   --    --    BASOPHILS  0.50   --    --      Recent Labs      04/16/18 0222 04/17/18 0400 04/18/18   0244   SODIUM  143  139  139   POTASSIUM  3.7  3.7  4.0   CHLORIDE  113*  111  106   CO2  22  24  24   GLUCOSE  86  91  86   BUN  17  13  9         Impression: Mrs. Dunn is a 53-year-old woman with acute multifocal infarcts concerning for cardioembolic process. No embolic sources been identified so far. She also likely had seizure activity secondary to be acute infarct. Her LACI has revealed a PFO. Also her CARMEN came back positive for 1:640 today.        Recommendations:  1. Continue Keppra 1000 mg twice a day for seizure  prevention.  2.  Continue aspirin for secondary stroke risk reduction  3.  Data on effectiveness of PFO closure in reducing future strokes is mixed.  I discussed the option of closure versus continuing aspirin treatment the patient and she'll consider her options.  4.  I will continue to follow. Please contact if any acute questions or concerns arise.  5.  I will defer to the medicine team and further testing is needed. Positive CARMEN. Anticardiolipin antibodies were negative so I am unlear this would be related to her stroke.

## 2018-04-19 NOTE — CARE PLAN
Problem: Communication  Goal: The ability to communicate needs accurately and effectively will improve  Outcome: PROGRESSING AS EXPECTED  Errol patient and significant other/support system to call light to alert staff of needs  Pt calls for help appropriately but has some difficulty expressing herself.             Problem: Safety  Goal: Will remain free from falls  Outcome: PROGRESSING AS EXPECTED  Provide assistance with mobility, standby by assist, calls for help appropriately

## 2018-04-19 NOTE — PROGRESS NOTES
[facility] Hospitalist Progress Note    Date of Service: 2018    Chief Complaint  53 y.o. female admitted 4/10/2018 for AMS presumably due to opiate OD, as she responded to narcan. Found to have CVA in addition to campylobacter infection in her stool. She has an abnormal EEG.    Interval Problem Update  Continue to be stable. No seizures reported. No new issues per nursing staff.    Consultants/Specialty  Neurology    Disposition  Rehab order placed. Family wanting to find a place closer to [place], [place] where the patient & her family live.          Review of Systems   Constitutional: Negative for chills and fever.   HENT: Negative for ear discharge, ear pain and tinnitus.    Eyes: Negative for double vision, photophobia and pain.   Respiratory: Negative for hemoptysis, sputum production and shortness of breath.    Cardiovascular: Negative for chest pain, palpitations and orthopnea.   Gastrointestinal: Negative for abdominal pain, diarrhea (chronic ), nausea and vomiting.   Genitourinary: Negative for dysuria and urgency.   Musculoskeletal: Positive for back pain, myalgias and neck pain.        Denies pain   Skin: Negative for itching.   Neurological: Negative for dizziness, tremors, sensory change and headaches. Speech change: Resolving    Endo/Heme/Allergies: Does not bruise/bleed easily.   Psychiatric/Behavioral: Negative for depression and suicidal ideas.        Physical Exam  Laboratory/Imaging   Hemodynamics  Temp (24hrs), Av °C (98.6 °F), Min:36.7 °C (98 °F), Max:37.2 °C (98.9 °F)   Temperature: 37.2 °C (98.9 °F)  Pulse  Av.7  Min: 12  Max: 111    Blood Pressure: 113/63      Respiratory  Respiration: 16, Pulse Oximetry: 96 %  RUL Breath Sounds: Clear, RML Breath Sounds: Clear, RLL Breath Sounds: Diminished, JOLYNN Breath Sounds: Clear, LLL Breath Sounds: Diminished    Fluids    Intake/Output Summary (Last 24 hours) at 18 1214  Last data filed at 18 0521   Gross per 24 hour   Intake                 0 ml   Output              650 ml   Net             -650 ml     Nutrition  Orders Placed This Encounter   Procedures   • DIET ORDER     Standing Status:   Standing     Number of Occurrences:   1     Order Specific Question:   Diet:     Answer:   Regular [1]     Order Specific Question:   Texture/Fiber modifications:     Answer:   Dysphagia 3(Mechanical Soft)specify fluid consistency(question 6) [3]     Order Specific Question:   Consistency/Fluid modifications:     Answer:   Nectar Thick [2]     Physical Exam   Constitutional: She is oriented to person, place, and time. She appears well-nourished. She is cooperative. No distress.   Thin     HENT:   Head: Normocephalic and atraumatic.   Mouth/Throat: No oropharyngeal exudate.   Eyes: Conjunctivae and EOM are normal. Pupils are equal, round, and reactive to light. No scleral icterus. Right eye exhibits no nystagmus. Left eye exhibits no nystagmus. Right pupil is round and reactive. Left pupil is round and reactive. Pupils are equal.   Neck: Neck supple. No tracheal deviation and no edema present. No thyromegaly present.   Cardiovascular: Normal rate and regular rhythm.  Exam reveals no gallop and no friction rub.    SR on tele monitoring     Pulmonary/Chest: Effort normal and breath sounds normal. No tachypnea. No respiratory distress. She has no decreased breath sounds. She has no wheezes. She has no rhonchi.   Not on O2   Abdominal: Soft. Normal appearance. She exhibits no distension and no abdominal bruit. There is no tenderness. There is no rebound.   Musculoskeletal: Normal range of motion. She exhibits no tenderness.   Neurological: She is alert and oriented to person, place, and time. She has normal strength. No cranial nerve deficit or sensory deficit. She displays no seizure activity. GCS eye subscore is 4. GCS verbal subscore is 4. GCS motor subscore is 6.   Difficult to assess.    Skin: Skin is warm and dry. She is not diaphoretic. No erythema.    Psychiatric: Her speech is delayed. She is slowed and withdrawn.   Poor insight.    Nursing note and vitals reviewed.    Recent Labs      04/17/18   0400  04/18/18   0244  04/19/18   0318   WBC  13.9*  11.9*  11.5*   RBC  3.75*  4.09*  4.41   HEMOGLOBIN  11.2*  12.4  13.0   HEMATOCRIT  34.5*  37.0  40.6   MCV  92.0  90.5  92.1   MCH  29.9  30.3  29.5   MCHC  32.5*  33.5*  32.0*   RDW  49.1  48.0  50.0   PLATELETCT  146*  165  235   MPV  10.9  12.3  11.6     Recent Labs      04/17/18   0400  04/18/18   0244  04/19/18   0856   SODIUM  139  139  139   POTASSIUM  3.7  4.0  4.0   CHLORIDE  111  106  105   CO2  24  24  28   GLUCOSE  91  86  100*   BUN  13  9  10   CREATININE  0.60  0.61  0.70   CALCIUM  7.9*  8.3*  8.3*                      Assessment/Plan     * Stroke syndrome (CMS-HCC)   Assessment & Plan    Multifocal infarctions, largest in the left frontal lobe, left posterior occipital lobe, right parietal-occipital cortex, and right hemicerebellum, respectively  Neuro following.  Echo unremarkable. EF 70%. No bubble study. Discussed with Neurology.    Carotid US normal.  LACI w bubble study showed PFO.  Neurology was discussed with the patient the option of surgical repair versus being on medical treatment with aspirin.  Neurologist recommended outpatient follow-up with cardiology as patient decided to proceed with surgical intervention. Otherwise, the management will be the same as patient receiving here.  Pending rehab acceptance.              Drug overdose - unintentional- (present on admission)   Assessment & Plan    Buprenorphine patch removed PTA.   Patient was on heavy doses of narcotics at home for chronic pain vs fibromyalgia  Denies suicidal ideation or history  Wean down narcotic as tolerated.             Normocytic anemia- (present on admission)   Assessment & Plan    Monitor H&H        Seizure disorder as sequela of cerebrovascular accident (CMS-HCC)- (present on admission)   Assessment & Plan    On  Keppra 1 g every 12 Iv.  On seizure precautions.  Neurology on board.        Chronic pain syndrome- (present on admission)   Assessment & Plan    Currently denies pain. Only required norco once overnight.  Wean Norco          Hypokalemia   Assessment & Plan    On replacement.        Aspiration pneumonia (CMS-HCC)- (present on admission)   Assessment & Plan    Completed a course of Unasyn.  Repeat cxr in appox 4-6 weeks to assess for resolution.   CT chest normal        Campylobacter diarrhea- (present on admission)   Assessment & Plan    Campylobacter on cultures from transferring facility. C-diff negative here on 4/10/18.  Still having frequent bouts of watery diarrhea.   Patient has history of chronic diarrhea for years now.  Possible contribution from opioid withdrawal.          Hyperglycemia- (present on admission)   Assessment & Plan    A1C normal at 5.4. No hx of Dm.  This likely steroid induced.   Monitor blood glucoses.            Leukocytosis- (present on admission)   Assessment & Plan    WBC trending down.    Completed Unasyn and Azithro complete 4/16.  Procalcitonin was normal.  Clinically with no signs with active infection.   On Cortef which might contribute to leucocytosis.           Elevated brain natriuretic peptide (BNP) level- (present on admission)   Assessment & Plan    BNP 8000s on arrival.   TTE showed normal diastolic function & EF 70%.  No evidence of FVO on exam.            Elevated troponin- (present on admission)   Assessment & Plan    No CP or hx of CAD.  Normal echocardiogram.  Repeat trended down.         Autonomic neuropathy- (present on admission)   Assessment & Plan    Now normotensive. BP stable. Allow for permissive hypertension in light of multiple strokes.  On low dose Cortef  Midodrine on hold as patient is normotensive.         Plan of care discussed with multidisciplinary team.   Quality-Core Measures   Reviewed items::  Labs reviewed, Medications reviewed, Radiology images  reviewed and EKG reviewed  Bishop catheter::  No Bishop  DVT prophylaxis pharmacological::  Heparin  : JESSICA besse bilaterally.  Ulcer Prophylaxis::  Not indicated  Assessed for rehabilitation services:  Patient was assess for and/or received rehabilitation services during this hospitalization

## 2018-04-19 NOTE — THERAPY
"Occupational Therapy Treatment completed with focus on ADLs, ADL transfers and patient education.  Functional Status:  Pt seen for OT tx today.  Pt was fatigued but cooperative during the session.  Continues to be limited by endurance, dizziness, and higher level ADLs.  Pt completed LB dressing with SBA and seated EOB gr/hy with SBA to encourage follow through.  Needing cues to initiate, follow through, and problem solve during ADL tasks.  Needed one supine rest break due to dizziness.  Pt completed supine to sit with mod I, sit to stand with CGA, and stand pivot with CGA due to dizziness.  Pt would benefit from continued inpt OT as they are continuing to need assistance with higher level ADLs.     Plan of Care: Will benefit from Occupational Therapy 3 times per week  Discharge Recommendations:  Equipment Will Continue to Assess for Equipment Needs.   See \"Rehab Therapy-Acute\" Patient Summary Report for complete documentation.   "

## 2018-04-19 NOTE — PROGRESS NOTES
Paged hospitalitis regarding patent being positive for orthostatic hypotension. After taking patient to the bathroom her blood pressure was 74/53 and was symptomatic. Explained that patient normally take midodrine at home for low blood pressure. Received a verbal order to start normal saline at 100 mL an hour for 1 liter. Will continue to monitor patient.

## 2018-04-20 PROCEDURE — 700102 HCHG RX REV CODE 250 W/ 637 OVERRIDE(OP): Performed by: INTERNAL MEDICINE

## 2018-04-20 PROCEDURE — 99232 SBSQ HOSP IP/OBS MODERATE 35: CPT | Performed by: FAMILY MEDICINE

## 2018-04-20 PROCEDURE — 700102 HCHG RX REV CODE 250 W/ 637 OVERRIDE(OP): Performed by: FAMILY MEDICINE

## 2018-04-20 PROCEDURE — A9270 NON-COVERED ITEM OR SERVICE: HCPCS | Performed by: FAMILY MEDICINE

## 2018-04-20 PROCEDURE — 700102 HCHG RX REV CODE 250 W/ 637 OVERRIDE(OP): Performed by: HOSPITALIST

## 2018-04-20 PROCEDURE — 700102 HCHG RX REV CODE 250 W/ 637 OVERRIDE(OP): Performed by: NURSE PRACTITIONER

## 2018-04-20 PROCEDURE — 700111 HCHG RX REV CODE 636 W/ 250 OVERRIDE (IP): Performed by: INTERNAL MEDICINE

## 2018-04-20 PROCEDURE — A9270 NON-COVERED ITEM OR SERVICE: HCPCS | Performed by: NURSE PRACTITIONER

## 2018-04-20 PROCEDURE — 770006 HCHG ROOM/CARE - MED/SURG/GYN SEMI*

## 2018-04-20 PROCEDURE — A9270 NON-COVERED ITEM OR SERVICE: HCPCS | Performed by: HOSPITALIST

## 2018-04-20 PROCEDURE — A9270 NON-COVERED ITEM OR SERVICE: HCPCS | Performed by: INTERNAL MEDICINE

## 2018-04-20 RX ADMIN — POTASSIUM CHLORIDE 20 MEQ: 1500 TABLET, EXTENDED RELEASE ORAL at 08:18

## 2018-04-20 RX ADMIN — MIDODRINE HYDROCHLORIDE 5 MG: 5 TABLET ORAL at 08:17

## 2018-04-20 RX ADMIN — PREGABALIN 150 MG: 75 CAPSULE ORAL at 20:23

## 2018-04-20 RX ADMIN — PREGABALIN 150 MG: 75 CAPSULE ORAL at 08:17

## 2018-04-20 RX ADMIN — HEPARIN SODIUM 5000 UNITS: 5000 INJECTION, SOLUTION INTRAVENOUS; SUBCUTANEOUS at 14:23

## 2018-04-20 RX ADMIN — HYDROCORTISONE 5 MG: 5 TABLET ORAL at 08:21

## 2018-04-20 RX ADMIN — HEPARIN SODIUM 5000 UNITS: 5000 INJECTION, SOLUTION INTRAVENOUS; SUBCUTANEOUS at 05:45

## 2018-04-20 RX ADMIN — MIDODRINE HYDROCHLORIDE 5 MG: 5 TABLET ORAL at 16:22

## 2018-04-20 RX ADMIN — LEVETIRACETAM 1000 MG: 500 TABLET, FILM COATED ORAL at 08:17

## 2018-04-20 RX ADMIN — LEVETIRACETAM 1000 MG: 500 TABLET, FILM COATED ORAL at 20:24

## 2018-04-20 RX ADMIN — HEPARIN SODIUM 5000 UNITS: 5000 INJECTION, SOLUTION INTRAVENOUS; SUBCUTANEOUS at 20:24

## 2018-04-20 RX ADMIN — HYDROCODONE BITARTRATE AND ACETAMINOPHEN 1 TABLET: 5; 325 TABLET ORAL at 20:23

## 2018-04-20 RX ADMIN — KETOROLAC TROMETHAMINE 10 MG: 10 TABLET, FILM COATED ORAL at 11:26

## 2018-04-20 RX ADMIN — ASPIRIN 81 MG: 81 TABLET, COATED ORAL at 08:17

## 2018-04-20 RX ADMIN — POTASSIUM CHLORIDE 20 MEQ: 1500 TABLET, EXTENDED RELEASE ORAL at 20:23

## 2018-04-20 RX ADMIN — HYDROCODONE BITARTRATE AND ACETAMINOPHEN 1 TABLET: 5; 325 TABLET ORAL at 08:17

## 2018-04-20 RX ADMIN — MIDODRINE HYDROCHLORIDE 5 MG: 5 TABLET ORAL at 11:26

## 2018-04-20 ASSESSMENT — LIFESTYLE VARIABLES: SUBSTANCE_ABUSE: 1

## 2018-04-20 ASSESSMENT — ENCOUNTER SYMPTOMS
HEMOPTYSIS: 0
PALPITATIONS: 0
COUGH: 0
ABDOMINAL PAIN: 0
TREMORS: 0
HEADACHES: 0
SHORTNESS OF BREATH: 0
DIARRHEA: 0
DEPRESSION: 0
HEARTBURN: 0
VOMITING: 0
ORTHOPNEA: 0
SENSORY CHANGE: 0
NAUSEA: 0
WEIGHT LOSS: 0
NECK PAIN: 1
CLAUDICATION: 0
DIZZINESS: 0
BLURRED VISION: 0
PHOTOPHOBIA: 0
MYALGIAS: 1
BRUISES/BLEEDS EASILY: 0
DOUBLE VISION: 0
EYE PAIN: 0
FEVER: 0
SPUTUM PRODUCTION: 0

## 2018-04-20 ASSESSMENT — PAIN SCALES - GENERAL
PAINLEVEL_OUTOF10: 6
PAINLEVEL_OUTOF10: 6

## 2018-04-20 NOTE — DIETARY
Nutrition Services: Brief Update  Day 10 of admit and patient with history of inadequate nutrition in the last several days. Patient is currently receiving a regular dysphagia 3 diet with nectar thick liquids.  Intake has improved in the last two days and patient has been consuming ~ 50-75% of meals during this time. Magic cups are currently provided TID.  I visited with patient at bedside this morning to discuss nutrition.  Patient reports that her appetite has been improving and she has been eating fairly well.  She does not like the magic cups but enjoys smoothies.  We also discussed snack options which will be added TID per patient preferences. I encouraged PO with patient and discussed benefit of small and frequent meals.    Plan/Recommend  Encourage PO intake of meals and snacks.  Nutrition rep will continue to see patient daily for meal and snack preferences.     RD monitoring.

## 2018-04-20 NOTE — CARE PLAN
Problem: Skin Integrity  Goal: Risk for impaired skin integrity will decrease  Pt encouraged to turn from side to side to reduce risk of skin breakdown.

## 2018-04-20 NOTE — DISCHARGE PLANNING
Per Rehab consult, pt is too high functioning. SNF recommended. Pt has not been accepted to a [place] facility at this time. SW emailed  Supervisor for possible local SNF [facility]. SW to leave pt on weekend report to discuss with family: SNF [facility] locally rather than in [place].

## 2018-04-20 NOTE — PSYCHIATRY
PSYCHIATRIC CONSULTATION:  Reason for admission:   AMS   Reason for consult: AMS   Requesting Physician: Nichole     Legal status:   No hold required     Chief Complaint: confusion     HPI:     54 y/o woman who presented via EMS for altered mental state. She was found unresponsive in bed.  Narcan was given with some improvement. She was incontinent of stool and urine. EEG showed possible seizures. She is visiting from [place]. Currently barely communicative. She does talk minimally. She is able to answer some yes or no questions.She is very easily distracted.  She is not freezing or holding postures. Her leg shaking appears to be in response to frustration; it is not a functional (or organic) neuro symptom. She isn't oriented. She does not appear to have any true catatonic symtpoms aside from the lack of speech. She is on Keppra but it was just started.     Psychiatric Review of Systems:current symptoms as reported by pt.  Depression:      Denies   Sana:No signs or symptoms   Anxiety/Panic Attacks  Denies   PTSD symptom: denies   Psychosis: unable to fully assess   Other:       Medical Review of Systems: as reported by pt. All systems reviewed. Only those found to be + are noted below. All others are negative.   Neurological:    TBIs: denies    SZs: +   Strokes: unknown    Other:  Psychiatric Examination: observed phenomenon:  Vitals:Hemodynamics  Temp (24hrs), Av.1 °C (98.7 °F), Min:35.9 °C (96.6 °F), Max:38.2 °C (100.7 °F)   Temperature: 37.2 °C (98.9 °F)  Pulse  Av.4  Min: 52  Max: 110 Heart Rate (Monitored): 68  Blood Pressure: 159/92, NIBP: 141/75    Musculoskeletal: psychomotor retardation   Appearance:poor grooming   Thoughts:very confused   Speech: slurred  Mood:    Unable to assess  Affect:         constricted  SI/HI:   Denies   Attention/Alertness:   Poor   Memory:    Poor   Orientation:    Not oriented  Fund of Knowledge:    Impaired   Insight/Judgement into symptoms: impaired   Neurological  Testing:          Past Psychiatric Hx:    no psych history noted in brief review of notes from  and [facility], aside from opiate dependence.  Family Psychiatric Hx:  Unknown     Social Hx:  Social History     Social History   • Marital status:      Spouse name: N/A   • Number of children: N/A   • Years of education: N/A     Occupational History   • Not on file.     Social History Main Topics   • Smoking status: Unknown If Ever Smoked   • Smokeless tobacco: Never Used   • Alcohol use No   • Drug use: No   • Sexual activity: Not on file     Other Topics Concern   • Not on file     Social History Narrative   • No narrative on file     Was visiting from [place].   Drug/Alcohol/Tobacco Hx:   Drugs:denies    Alcohol:unknwon    Tobacco:    Medical Hx: labs, MARS, medications, etc were reviewed. Only those findings of potential interest to psychiatry are noted below:  Past Medical History:   Diagnosis Date   • Chronic pain    • Hypotension    • Opiate use        Allergies: Sulfamethoxazole-trimethoprim    Medications:  No current facility-administered medications on file prior to encounter.      Current Outpatient Prescriptions on File Prior to Encounter   Medication Sig Dispense Refill   • Buprenorphine 20 MCG/HR PATCH WEEKLY Apply 1 Patch to skin as directed every 7 days.     • hydrocortisone (CORTEF) 10 MG Tab Take 50 mg by mouth every 8 hours.     • pregabalin (LYRICA) 150 MG Cap Take 150 mg by mouth 2 Times a Day.     • estradiol (VIVELLE DOT) 0.1 MG/24HR PATCH BIWEEKLY Apply 1 Patch to skin as directed 2X A WEEK.     • HYDROmorphone (DILAUDID) 4 MG Tab Take 4 mg by mouth every 4 hours.     • midodrine (PROAMATINE) 10 MG tablet Take 10 mg by mouth 4 times a day.     • Morphine Sulfate ER 15 MG Tablet Extended Release Abuse-Deterrent Take 15 mg by mouth every 8 hours.         Labs:      04/10/18   0330  04/11/18   0720  04/12/18   0235   WBC  16.0*  13.5*  19.0*   RBC  4.31  3.72*  4.27   HEMOGLOBIN  13.0   11.2*  12.8   HEMATOCRIT  39.4  33.6*  37.8   MCV  91.4  90.3  88.5   MCH  30.2  30.1  30.0   MCHC  33.0  33.3*  33.9   RDW  14.2  45.1  43.5   PLATELETCT  174  132*  147*   MPV  11.0*  11.0  10.6            Recent Labs      04/10/18   0330  04/11/18   0720  04/12/18   0235   SODIUM  134*  143  143   POTASSIUM  4.7  3.8  3.0*   CHLORIDE  103  111  109   CO2  23  22  22   GLUCOSE  130*  99  99   BUN  26*  19  16   CREATININE  1.3*  0.79  0.77   CALCIUM  7.9*  8.0*  8.5              Recent Labs      04/10/18   0330   BNPBTYPENAT  8030         ASSESSMENT/ PLAN:   Delirium   Neurocognitive disorder unspecified     This is not likely catatonia. She does not appear to have any true catatonic symtpoms aside from the lack of speech, which leads me to think this is still delirium. If it doesn't resolve as her physical status improves recommend starting ativan at a higher dose and more regularly. Of note, no psych history noted in brief review of notes from  and [facility], aside from opiate dependence. Certain medication wtihdrawals can cause this kind of syndrome- withdrawal from baclofen, strongly anticholinergic medication, and MAOIs as examples; would try to ensure she isn't withdrawing from one of these.      MRI ordered, await results    Will sign off at this time, please reconsult if no reason for AMS becomes apparent.

## 2018-04-20 NOTE — CARE PLAN
Problem: Safety  Goal: Will remain free from injury  Pt uses call light appropriately for assistance.

## 2018-04-20 NOTE — PROGRESS NOTES
Neuroscience Shift Summary: [id number]  Orientation- alert and oriented x4  Incision- NA  Lines/Drains- PIV  Telemetry- NA  Pain- chronic pain, scheduled narcotics and PRN tramodol and scheduled lyrica  Diet-  Dysphagia 3 with nectar thick  Discharge Plan- Home vs SNF??    Concerns-  No acute concerns.  Restarted midodrine

## 2018-04-20 NOTE — CARE PLAN
Problem: Nutritional:  Goal: Achieve adequate nutritional intake  Patient will tolerate po diet with at least 50% intake of meals.   Outcome: PROGRESSING AS EXPECTED  See dietary note.

## 2018-04-20 NOTE — DISCHARGE PLANNING
Per Dr. Amador consult:    The patient is not a good candidate for an acute inpatient rehabilitation program as she is too high level functionally with physical and occupational therapy at [facility] to contact-guard assist.     If she has discharge support, recommend discharge home with outpatient speech therapy.

## 2018-04-21 PROCEDURE — 700111 HCHG RX REV CODE 636 W/ 250 OVERRIDE (IP): Performed by: INTERNAL MEDICINE

## 2018-04-21 PROCEDURE — 700102 HCHG RX REV CODE 250 W/ 637 OVERRIDE(OP): Performed by: NURSE PRACTITIONER

## 2018-04-21 PROCEDURE — A9270 NON-COVERED ITEM OR SERVICE: HCPCS | Performed by: INTERNAL MEDICINE

## 2018-04-21 PROCEDURE — 700102 HCHG RX REV CODE 250 W/ 637 OVERRIDE(OP): Performed by: INTERNAL MEDICINE

## 2018-04-21 PROCEDURE — 700102 HCHG RX REV CODE 250 W/ 637 OVERRIDE(OP): Performed by: FAMILY MEDICINE

## 2018-04-21 PROCEDURE — 770006 HCHG ROOM/CARE - MED/SURG/GYN SEMI*

## 2018-04-21 PROCEDURE — 99232 SBSQ HOSP IP/OBS MODERATE 35: CPT | Performed by: FAMILY MEDICINE

## 2018-04-21 PROCEDURE — A9270 NON-COVERED ITEM OR SERVICE: HCPCS | Performed by: NURSE PRACTITIONER

## 2018-04-21 PROCEDURE — A9270 NON-COVERED ITEM OR SERVICE: HCPCS | Performed by: FAMILY MEDICINE

## 2018-04-21 PROCEDURE — 92526 ORAL FUNCTION THERAPY: CPT

## 2018-04-21 RX ORDER — MIDODRINE HYDROCHLORIDE 5 MG/1
10 TABLET ORAL
Status: DISCONTINUED | OUTPATIENT
Start: 2018-04-21 | End: 2018-05-02

## 2018-04-21 RX ORDER — PREGABALIN 75 MG/1
75 CAPSULE ORAL ONCE
Status: DISPENSED | OUTPATIENT
Start: 2018-04-21 | End: 2018-04-22

## 2018-04-21 RX ADMIN — PREGABALIN 150 MG: 75 CAPSULE ORAL at 21:08

## 2018-04-21 RX ADMIN — LEVETIRACETAM 1000 MG: 500 TABLET, FILM COATED ORAL at 08:48

## 2018-04-21 RX ADMIN — PREGABALIN 150 MG: 75 CAPSULE ORAL at 09:41

## 2018-04-21 RX ADMIN — HEPARIN SODIUM 5000 UNITS: 5000 INJECTION, SOLUTION INTRAVENOUS; SUBCUTANEOUS at 13:46

## 2018-04-21 RX ADMIN — MICONAZOLE NITRATE: 20 CREAM TOPICAL at 21:20

## 2018-04-21 RX ADMIN — HEPARIN SODIUM 5000 UNITS: 5000 INJECTION, SOLUTION INTRAVENOUS; SUBCUTANEOUS at 05:32

## 2018-04-21 RX ADMIN — ACETAMINOPHEN 650 MG: 325 TABLET, FILM COATED ORAL at 04:44

## 2018-04-21 RX ADMIN — HYDROCORTISONE 5 MG: 5 TABLET ORAL at 08:49

## 2018-04-21 RX ADMIN — MIDODRINE HYDROCHLORIDE 10 MG: 5 TABLET ORAL at 16:55

## 2018-04-21 RX ADMIN — HEPARIN SODIUM 5000 UNITS: 5000 INJECTION, SOLUTION INTRAVENOUS; SUBCUTANEOUS at 21:08

## 2018-04-21 RX ADMIN — POTASSIUM CHLORIDE 20 MEQ: 1500 TABLET, EXTENDED RELEASE ORAL at 08:49

## 2018-04-21 RX ADMIN — POTASSIUM CHLORIDE 20 MEQ: 1500 TABLET, EXTENDED RELEASE ORAL at 21:08

## 2018-04-21 RX ADMIN — MIDODRINE HYDROCHLORIDE 10 MG: 5 TABLET ORAL at 11:56

## 2018-04-21 RX ADMIN — MICONAZOLE NITRATE: 20 CREAM TOPICAL at 14:00

## 2018-04-21 RX ADMIN — HYDROCODONE BITARTRATE AND ACETAMINOPHEN 1 TABLET: 5; 325 TABLET ORAL at 08:49

## 2018-04-21 RX ADMIN — ASPIRIN 81 MG: 81 TABLET, COATED ORAL at 08:49

## 2018-04-21 RX ADMIN — HYDROCODONE BITARTRATE AND ACETAMINOPHEN 1 TABLET: 5; 325 TABLET ORAL at 13:46

## 2018-04-21 RX ADMIN — LEVETIRACETAM 1000 MG: 500 TABLET, FILM COATED ORAL at 21:08

## 2018-04-21 RX ADMIN — HYDROCODONE BITARTRATE AND ACETAMINOPHEN 1 TABLET: 5; 325 TABLET ORAL at 21:08

## 2018-04-21 ASSESSMENT — ENCOUNTER SYMPTOMS
PALPITATIONS: 0
BACK PAIN: 1
TREMORS: 0
COUGH: 0
SPUTUM PRODUCTION: 0
HEARTBURN: 0
FEVER: 0
PHOTOPHOBIA: 0
HEADACHES: 0
VOMITING: 0
DOUBLE VISION: 0
BRUISES/BLEEDS EASILY: 0
BLURRED VISION: 0
DIZZINESS: 1
HEMOPTYSIS: 0
NECK PAIN: 1
DEPRESSION: 0
CHILLS: 0
NAUSEA: 0
SENSORY CHANGE: 0
ORTHOPNEA: 0
MYALGIAS: 1

## 2018-04-21 ASSESSMENT — PAIN SCALES - GENERAL
PAINLEVEL_OUTOF10: 0
PAINLEVEL_OUTOF10: 9
PAINLEVEL_OUTOF10: 7
PAINLEVEL_OUTOF10: 3
PAINLEVEL_OUTOF10: 1
PAINLEVEL_OUTOF10: 5

## 2018-04-21 ASSESSMENT — LIFESTYLE VARIABLES: SUBSTANCE_ABUSE: 1

## 2018-04-21 NOTE — CARE PLAN
Problem: Safety  Goal: Will remain free from injury  Pt calls appropriately for assistance to restroom.

## 2018-04-21 NOTE — DIETARY
"Nutrition Services:  Brief Update    Consult received for \"dietitian consult & diet order per MD chatman\".  RD currently following pt for adequate PO intake and visited with pt at bedside yesterday.  Snacks and preferences were updated.  RD encouraged PO and the benefit of small, frequent meals.  Please reference full note from 4/20 if needed.  RD will continue to follow.  "

## 2018-04-21 NOTE — THERAPY
"Speech Language Therapy dysphagia treatment completed.   Functional Status: Still presenting with word finding difficulties in conversation but able to get point across. Tolerating current D3/NTL diet and small sips of thin liquids. Educ pt and RN regarding swallow precautions and need to DC thin liquids with any s/sx of aspiration.   Recommendations: Continue D3/NTL with pt OK for small sips of thin liquids between meals.   Plan of Care: Will benefit from Speech Therapy 5 times per week  Post-Acute Therapy: Discharge to a transitional care facility for continued skilled therapy services.    See \"Rehab Therapy-Acute\" Patient Summary Report for complete documentation.     "

## 2018-04-21 NOTE — CARE PLAN
Problem: Pain Management  Goal: Pain level will decrease to patient's comfort goal  Pt able to rest following scheduled pain medications.

## 2018-04-21 NOTE — PROGRESS NOTES
Neuroscience Shift Summary: [id number]  Orientation- Alert and oriented x4  Incision- NA  Lines/Drains- PIV  Telemetry- NA  Pain- Well controlled, patient declined one scheduled norco today  Diet-  Dysphagia 3 with nectar thick liquids- patient not eating much, however has been taking in a lot of liquid supplements  Discharge Plan- SNF    Concerns-  Patient c/o being more tired and dizzy today, started Midodrine yesterday, BP stable.  Encouraged patient to eat more, asked speech therapist to see patient about changing diet.

## 2018-04-21 NOTE — PROGRESS NOTES
Michelle CHRISTIE, assisted pt to restroom and on the way back to bed, pt stopped and stated her head hurt. RN then held on to pt as she went limp. Calling for help, staff arrived and assisted getting pt back in bed. Vital signs stable. bp 101/67 P 85, resp 16, O2 95% on room air. Pt now resting in bed with only complaint of being dizzy.

## 2018-04-21 NOTE — PROGRESS NOTES
[facility]ist Progress Note    Date of Service: 2018    Chief Complaint  53 y.o. female admitted 4/10/2018 for AMS presumably due to opiate OD, as she responded to narcan. Found to have CVA in addition to campylobacter infection in her stool. She has an abnormal EEG.    Interval Problem Update  Complaining of occasional vague pain in different part of the body that comes and goes! She has no complaints to report herself.    Consultants/Specialty  Neurology    Disposition  Rehab order placed. Family wanting to find a place closer to [place], [place] where the patient & her family live.          Review of Systems   Constitutional: Negative for fever and weight loss.   HENT: Negative for ear discharge, ear pain, hearing loss and tinnitus.    Eyes: Negative for blurred vision, double vision, photophobia and pain.   Respiratory: Negative for cough, hemoptysis, sputum production and shortness of breath.    Cardiovascular: Negative for chest pain, palpitations, orthopnea and claudication.   Gastrointestinal: Negative for abdominal pain, diarrhea (chronic ), heartburn, nausea and vomiting.   Genitourinary: Negative for dysuria, frequency and urgency.   Musculoskeletal: Positive for myalgias and neck pain.        Denies pain   Skin: Negative for itching.   Neurological: Negative for dizziness, tremors, sensory change and headaches. Speech change: Resolving    Endo/Heme/Allergies: Negative for environmental allergies. Does not bruise/bleed easily.   Psychiatric/Behavioral: Positive for substance abuse. Negative for depression and suicidal ideas.        Physical Exam  Laboratory/Imaging   Hemodynamics  Temp (24hrs), Av.3 °C (99.1 °F), Min:36.8 °C (98.2 °F), Max:37.5 °C (99.5 °F)   Temperature: 37.5 °C (99.5 °F)  Pulse  Av.8  Min: 12  Max: 111    Blood Pressure: 118/72      Respiratory  Respiration: 14, Pulse Oximetry: 90 %  RUL Breath Sounds: Clear, RML Breath Sounds: Clear, RLL Breath Sounds: Diminished, JOLYNN  Breath Sounds: Clear, LLL Breath Sounds: Diminished    Fluids    Intake/Output Summary (Last 24 hours) at 04/20/18 1741  Last data filed at 04/20/18 1624   Gross per 24 hour   Intake              900 ml   Output                0 ml   Net              900 ml     Nutrition  Orders Placed This Encounter   Procedures   • DIET ORDER     Standing Status:   Standing     Number of Occurrences:   1     Order Specific Question:   Diet:     Answer:   Regular [1]     Order Specific Question:   Texture/Fiber modifications:     Answer:   Dysphagia 3(Mechanical Soft)specify fluid consistency(question 6) [3]     Order Specific Question:   Consistency/Fluid modifications:     Answer:   Nectar Thick [2]     Physical Exam   Constitutional: She is oriented to person, place, and time. She appears well-nourished. She is cooperative. No distress.   Thin     HENT:   Head: Normocephalic and atraumatic.   Mouth/Throat: No oropharyngeal exudate.   Eyes: Conjunctivae and EOM are normal. Pupils are equal, round, and reactive to light. No scleral icterus. Right eye exhibits no nystagmus. Left eye exhibits no nystagmus. Right pupil is round and reactive. Left pupil is round and reactive. Pupils are equal.   Neck: Neck supple. No edema present. No thyromegaly present.   Cardiovascular: Normal rate and regular rhythm.  Exam reveals no gallop and no friction rub.    SR on tele monitoring     Pulmonary/Chest: Effort normal and breath sounds normal. No tachypnea. No respiratory distress. She has no decreased breath sounds. She has no wheezes. She has no rhonchi.   Not on O2   Abdominal: Soft. Normal appearance. She exhibits no distension and no abdominal bruit. There is no tenderness.   Musculoskeletal: Normal range of motion. She exhibits no tenderness or deformity.   Neurological: She is alert and oriented to person, place, and time. She has normal strength. No cranial nerve deficit or sensory deficit. She displays no seizure activity. GCS eye  subscore is 4. GCS verbal subscore is 4. GCS motor subscore is 6.   Difficult to assess.    Skin: Skin is warm and dry. No rash noted. No erythema.   Psychiatric: Her speech is delayed. She is slowed and withdrawn.   Poor insight.    Nursing note and vitals reviewed.    Recent Labs      04/18/18 0244  04/19/18   0318   WBC  11.9*  11.5*   RBC  4.09*  4.41   HEMOGLOBIN  12.4  13.0   HEMATOCRIT  37.0  40.6   MCV  90.5  92.1   MCH  30.3  29.5   MCHC  33.5*  32.0*   RDW  48.0  50.0   PLATELETCT  165  235   MPV  12.3  11.6     Recent Labs      04/18/18 0244  04/19/18   0856   SODIUM  139  139   POTASSIUM  4.0  4.0   CHLORIDE  106  105   CO2  24  28   GLUCOSE  86  100*   BUN  9  10   CREATININE  0.61  0.70   CALCIUM  8.3*  8.3*                      Assessment/Plan     * Stroke syndrome (CMS-HCC)   Assessment & Plan    Multifocal infarctions, largest in the left frontal lobe, left posterior occipital lobe, right parietal-occipital cortex, and right hemicerebellum, respectively  Neuro following.  Echo unremarkable. EF 70%. No bubble study. Discussed with Neurology.    Carotid US normal.  LACI w bubble study showed PFO.  Neurology was discussed with the patient the option of surgical repair versus being on medical treatment with aspirin.  Neurologist recommended outpatient follow-up with cardiology if patient decided to proceed with surgical intervention. Otherwise, the management will be the same as patient receiving here.  Pending rehab acceptance.              Drug overdose - unintentional- (present on admission)   Assessment & Plan    Buprenorphine patch removed PTA.   Patient was on heavy doses of narcotics at home for chronic pain vs fibromyalgia  Denies suicidal ideation or history  Wean down narcotic as tolerated.             Normocytic anemia- (present on admission)   Assessment & Plan    Monitor H&H        Seizure disorder as sequela of cerebrovascular accident (CMS-HCC)- (present on admission)   Assessment &  Plan    On Keppra 1 g every 12 Iv.  On seizure precautions.  Neurology on board.        Chronic pain syndrome- (present on admission)   Assessment & Plan    Currently denies pain. Only required norco once overnight.  Wean Norco          Hypokalemia   Assessment & Plan    On replacement.        Aspiration pneumonia (CMS-HCC)- (present on admission)   Assessment & Plan    Completed a course of Unasyn.  Repeat cxr in appox 4-6 weeks to assess for resolution.   CT chest normal        Campylobacter diarrhea- (present on admission)   Assessment & Plan    Campylobacter on cultures from transferring facility. C-diff negative here on 4/10/18.  Still having frequent bouts of watery diarrhea.   Patient has history of chronic diarrhea for years now.  Possible contribution from opioid withdrawal.          Hyperglycemia- (present on admission)   Assessment & Plan    A1C normal at 5.4. No hx of Dm.  This likely steroid induced.   Monitor blood glucoses.            Leukocytosis- (present on admission)   Assessment & Plan    WBC trending down.    Completed Unasyn and Azithro complete 4/16.  Procalcitonin was normal.  Clinically with no signs with active infection.   On Cortef which might contribute to leucocytosis.           Elevated brain natriuretic peptide (BNP) level- (present on admission)   Assessment & Plan    BNP 8000s on arrival.   TTE showed normal diastolic function & EF 70%.  No evidence of FVO on exam.            Elevated troponin- (present on admission)   Assessment & Plan    No CP or hx of CAD.  Normal echocardiogram.  Repeat trended down.         Autonomic neuropathy- (present on admission)   Assessment & Plan    Now normotensive. BP stable. Allow for permissive hypertension in light of multiple strokes.  On low dose Cortef  Midodrine on hold as patient is normotensive.         Plan of care discussed with multidisciplinary team.   Quality-Core Measures   Reviewed items::  Labs reviewed, Medications reviewed,  Radiology images reviewed and EKG reviewed  Bishop catheter::  No Bishop  DVT prophylaxis pharmacological::  Heparin  : JESSICA hose bilaterally.  Ulcer Prophylaxis::  Not indicated  Assessed for rehabilitation services:  Patient was assess for and/or received rehabilitation services during this hospitalization

## 2018-04-22 PROBLEM — I95.9 HYPOTENSION: Status: ACTIVE | Noted: 2018-04-22

## 2018-04-22 PROBLEM — R63.0 ANOREXIA: Status: ACTIVE | Noted: 2018-04-22

## 2018-04-22 PROCEDURE — 700102 HCHG RX REV CODE 250 W/ 637 OVERRIDE(OP): Performed by: INTERNAL MEDICINE

## 2018-04-22 PROCEDURE — 99232 SBSQ HOSP IP/OBS MODERATE 35: CPT | Performed by: FAMILY MEDICINE

## 2018-04-22 PROCEDURE — A9270 NON-COVERED ITEM OR SERVICE: HCPCS | Performed by: NURSE PRACTITIONER

## 2018-04-22 PROCEDURE — A9270 NON-COVERED ITEM OR SERVICE: HCPCS | Performed by: INTERNAL MEDICINE

## 2018-04-22 PROCEDURE — 770006 HCHG ROOM/CARE - MED/SURG/GYN SEMI*

## 2018-04-22 PROCEDURE — A9270 NON-COVERED ITEM OR SERVICE: HCPCS | Performed by: FAMILY MEDICINE

## 2018-04-22 PROCEDURE — 700102 HCHG RX REV CODE 250 W/ 637 OVERRIDE(OP): Performed by: NURSE PRACTITIONER

## 2018-04-22 PROCEDURE — 700111 HCHG RX REV CODE 636 W/ 250 OVERRIDE (IP): Performed by: INTERNAL MEDICINE

## 2018-04-22 PROCEDURE — 700102 HCHG RX REV CODE 250 W/ 637 OVERRIDE(OP): Performed by: FAMILY MEDICINE

## 2018-04-22 RX ORDER — MIRTAZAPINE 15 MG/1
15 TABLET, FILM COATED ORAL
Status: DISCONTINUED | OUTPATIENT
Start: 2018-04-22 | End: 2018-04-26

## 2018-04-22 RX ORDER — HYDROCORTISONE 5 MG/1
5 TABLET ORAL 2 TIMES DAILY
Status: DISCONTINUED | OUTPATIENT
Start: 2018-04-22 | End: 2018-04-26

## 2018-04-22 RX ADMIN — MIDODRINE HYDROCHLORIDE 10 MG: 5 TABLET ORAL at 16:53

## 2018-04-22 RX ADMIN — HEPARIN SODIUM 5000 UNITS: 5000 INJECTION, SOLUTION INTRAVENOUS; SUBCUTANEOUS at 20:19

## 2018-04-22 RX ADMIN — MICONAZOLE NITRATE: 20 CREAM TOPICAL at 20:25

## 2018-04-22 RX ADMIN — PREGABALIN 150 MG: 75 CAPSULE ORAL at 08:18

## 2018-04-22 RX ADMIN — MIDODRINE HYDROCHLORIDE 10 MG: 5 TABLET ORAL at 08:18

## 2018-04-22 RX ADMIN — POTASSIUM CHLORIDE 20 MEQ: 1500 TABLET, EXTENDED RELEASE ORAL at 08:17

## 2018-04-22 RX ADMIN — LEVETIRACETAM 1000 MG: 500 TABLET, FILM COATED ORAL at 08:17

## 2018-04-22 RX ADMIN — ASPIRIN 81 MG: 81 TABLET, COATED ORAL at 08:18

## 2018-04-22 RX ADMIN — HEPARIN SODIUM 5000 UNITS: 5000 INJECTION, SOLUTION INTRAVENOUS; SUBCUTANEOUS at 05:35

## 2018-04-22 RX ADMIN — HYDROCODONE BITARTRATE AND ACETAMINOPHEN 1 TABLET: 5; 325 TABLET ORAL at 20:19

## 2018-04-22 RX ADMIN — HYDROCODONE BITARTRATE AND ACETAMINOPHEN 1 TABLET: 5; 325 TABLET ORAL at 08:18

## 2018-04-22 RX ADMIN — MICONAZOLE NITRATE: 20 CREAM TOPICAL at 08:22

## 2018-04-22 RX ADMIN — HYDROCORTISONE 5 MG: 5 TABLET ORAL at 20:19

## 2018-04-22 RX ADMIN — ACETAMINOPHEN 650 MG: 325 TABLET, FILM COATED ORAL at 22:08

## 2018-04-22 RX ADMIN — MIDODRINE HYDROCHLORIDE 10 MG: 5 TABLET ORAL at 12:38

## 2018-04-22 RX ADMIN — LEVETIRACETAM 1000 MG: 500 TABLET, FILM COATED ORAL at 20:19

## 2018-04-22 RX ADMIN — MIRTAZAPINE 15 MG: 15 TABLET, FILM COATED ORAL at 20:18

## 2018-04-22 RX ADMIN — PREGABALIN 150 MG: 75 CAPSULE ORAL at 20:19

## 2018-04-22 RX ADMIN — HYDROCODONE BITARTRATE AND ACETAMINOPHEN 1 TABLET: 5; 325 TABLET ORAL at 14:37

## 2018-04-22 RX ADMIN — HEPARIN SODIUM 5000 UNITS: 5000 INJECTION, SOLUTION INTRAVENOUS; SUBCUTANEOUS at 14:37

## 2018-04-22 RX ADMIN — POTASSIUM CHLORIDE 20 MEQ: 1500 TABLET, EXTENDED RELEASE ORAL at 20:18

## 2018-04-22 RX ADMIN — MICONAZOLE NITRATE: 20 CREAM TOPICAL at 12:39

## 2018-04-22 RX ADMIN — HYDROCORTISONE 5 MG: 5 TABLET ORAL at 08:18

## 2018-04-22 ASSESSMENT — PAIN SCALES - GENERAL
PAINLEVEL_OUTOF10: 7
PAINLEVEL_OUTOF10: 7
PAINLEVEL_OUTOF10: 3
PAINLEVEL_OUTOF10: 6
PAINLEVEL_OUTOF10: 6
PAINLEVEL_OUTOF10: 5
PAINLEVEL_OUTOF10: 6
PAINLEVEL_OUTOF10: 3

## 2018-04-22 ASSESSMENT — ENCOUNTER SYMPTOMS
DEPRESSION: 0
COUGH: 0
PALPITATIONS: 0
ORTHOPNEA: 0
BLURRED VISION: 0
WEIGHT LOSS: 0
DOUBLE VISION: 0
HEARTBURN: 0
SENSORY CHANGE: 0
BACK PAIN: 1
FEVER: 0
NAUSEA: 0
NECK PAIN: 1
HEMOPTYSIS: 0
MYALGIAS: 1
HEADACHES: 0
ABDOMINAL PAIN: 0
DIZZINESS: 1
PHOTOPHOBIA: 0
TREMORS: 0
SPUTUM PRODUCTION: 0
BRUISES/BLEEDS EASILY: 0

## 2018-04-22 ASSESSMENT — LIFESTYLE VARIABLES: SUBSTANCE_ABUSE: 1

## 2018-04-22 NOTE — PROGRESS NOTES
Pt is alert and oriented x4, Reports fibromyalgic pain, medicated per MAR.with mild exp. Aphasia. On RA. Diet is dysphagia 3 with nectar thick fluids. Poor oral intake. Rash on buttock noted, Dillon ointment available. Ambulates with sba to 1 assist, pt at high risk for orthostatic hypotension. Frame alarm on, call light w/in reach, hourly rounding in place.

## 2018-04-22 NOTE — CARE PLAN
Problem: Fluid Volume:  Goal: Will maintain balanced intake and output    Intervention: Monitor, educate, and encourage compliance with therapeutic intake of liquids  Pt encouraged to increase intake of fluids to reduce the risk of hypotension.      Problem: Skin Integrity  Goal: Risk for impaired skin integrity will decrease    Intervention: Implement precautions to protect skin integrity in collaboration with the interdisciplinary team  Dillon ointment applied to bottom.

## 2018-04-22 NOTE — PROGRESS NOTES
Neuroscience Shift Summary: [phone]  Orientation- Alert and oriented x4 with expressive aphasia  Incision- Rash on buttocks has cream that she uses  Lines/Drains- PIV  Telemetry- NA  Pain- Scheduled norco for fibromyalgia  Diet-  Dysphagia 3 with nectar thick liquids  Discharge Plan- SNF    Concerns-  Patient c/o being dizzy, Increased midodrine from 5mg to 10mg

## 2018-04-22 NOTE — ASSESSMENT & PLAN NOTE
Continue to encourage oral intake.  Appropriate caloric intake discussed with patient and family.  She has required a PEG in the past.  Dietician following.

## 2018-04-22 NOTE — CARE PLAN
Problem: Skin Integrity  Goal: Risk for impaired skin integrity will decrease  Encouraged and educated pt on importance of allowing staff to apply cream to buttocks to help heal reddened area.

## 2018-04-22 NOTE — PROGRESS NOTES
[facility] Hospitalist Progress Note    Date of Service: 2018    Chief Complaint  53 y.o. female admitted 4/10/2018 for AMS presumably due to opiate OD, as she responded to narcan. Found to have CVA in addition to campylobacter infection in her stool. She has an abnormal EEG.    Interval Problem Update  Patient stated that she feels dizzy occasionally when she sat up. Midrin does was increased to home dose. Patient now feels better. No other issues to report.    Consultants/Specialty  Neurology    Disposition  Rehab order placed. Family wanting to find a place closer to [place], [place] where the patient & her family live.          Review of Systems   Constitutional: Negative for chills and fever.   HENT: Negative for ear pain, hearing loss and tinnitus.    Eyes: Negative for blurred vision, double vision and photophobia.   Respiratory: Negative for cough, hemoptysis and sputum production.    Cardiovascular: Negative for chest pain, palpitations and orthopnea.   Gastrointestinal: Negative for heartburn, nausea and vomiting. Diarrhea: chronic    Genitourinary: Negative for dysuria, frequency and urgency.   Musculoskeletal: Positive for back pain, myalgias and neck pain.        Denies pain   Skin: Negative for rash.   Neurological: Positive for dizziness. Negative for tremors, sensory change and headaches. Speech change: Resolving    Endo/Heme/Allergies: Negative for environmental allergies. Does not bruise/bleed easily.   Psychiatric/Behavioral: Positive for substance abuse. Negative for depression and suicidal ideas.        Physical Exam  Laboratory/Imaging   Hemodynamics  Temp (24hrs), Av.9 °C (98.5 °F), Min:36.9 °C (98.4 °F), Max:37.1 °C (98.7 °F)   Temperature: 36.9 °C (98.4 °F)  Pulse  Av.1  Min: 12  Max: 111    Blood Pressure: (!) 97/63 (rn notified)      Respiratory  Respiration: 12, Pulse Oximetry: 95 %  RUL Breath Sounds: Clear, RML Breath Sounds: Clear, RLL Breath Sounds: Diminished, JOLYNN Breath Sounds:  Clear, LLL Breath Sounds: Diminished    Fluids  No intake or output data in the 24 hours ending 04/21/18 1825  Nutrition  Orders Placed This Encounter   Procedures   • DIET ORDER     Standing Status:   Standing     Number of Occurrences:   1     Order Specific Question:   Diet:     Answer:   Regular [1]     Order Specific Question:   Texture/Fiber modifications:     Answer:   Dysphagia 3(Mechanical Soft)specify fluid consistency(question 6) [3]     Order Specific Question:   Consistency/Fluid modifications:     Answer:   Nectar Thick [2]     Physical Exam   Constitutional: She is oriented to person, place, and time. She appears well-developed and well-nourished. She is cooperative. No distress.   Thin     HENT:   Head: Normocephalic and atraumatic.   Mouth/Throat: No oropharyngeal exudate.   Eyes: Conjunctivae and EOM are normal. Pupils are equal, round, and reactive to light. No scleral icterus. Right eye exhibits no nystagmus. Left eye exhibits no nystagmus. Right pupil is round and reactive. Left pupil is round and reactive. Pupils are equal.   Neck: Neck supple. No edema present. No thyromegaly present.   Cardiovascular: Normal rate and regular rhythm.  Exam reveals no gallop and no friction rub.    SR on tele monitoring     Pulmonary/Chest: Effort normal and breath sounds normal. No tachypnea. No respiratory distress. She has no decreased breath sounds. She has no wheezes. She has no rhonchi.   Not on O2   Abdominal: Soft. Normal appearance. She exhibits no distension and no abdominal bruit. There is no tenderness. There is no rebound.   Musculoskeletal: Normal range of motion. She exhibits no tenderness or deformity.   Neurological: She is alert and oriented to person, place, and time. She has normal strength. No cranial nerve deficit or sensory deficit. She displays no seizure activity. GCS eye subscore is 4. GCS verbal subscore is 4. GCS motor subscore is 6.   Difficult to assess.    Skin: Skin is warm and  dry. No rash noted. She is not diaphoretic. No erythema.   Psychiatric: Her speech is delayed. She is slowed and withdrawn.   Poor insight.    Nursing note and vitals reviewed.    Recent Labs      04/19/18   0318   WBC  11.5*   RBC  4.41   HEMOGLOBIN  13.0   HEMATOCRIT  40.6   MCV  92.1   MCH  29.5   MCHC  32.0*   RDW  50.0   PLATELETCT  235   MPV  11.6     Recent Labs      04/19/18   0856   SODIUM  139   POTASSIUM  4.0   CHLORIDE  105   CO2  28   GLUCOSE  100*   BUN  10   CREATININE  0.70   CALCIUM  8.3*                      Assessment/Plan     * Stroke syndrome (CMS-HCC)   Assessment & Plan    Multifocal infarctions, largest in the left frontal lobe, left posterior occipital lobe, right parietal-occipital cortex, and right hemicerebellum, respectively  Neuro following.  Echo unremarkable. EF 70%. No bubble study. Discussed with Neurology.    Carotid US normal.  LACI w bubble study showed PFO.  Neurology was discussed with the patient the option of surgical repair versus being on medical treatment with aspirin.  Neurologist recommended outpatient follow-up with cardiology if patient decided to proceed with surgical intervention. Otherwise, the management will be the same as patient receiving here.  Pending rehab acceptance.              Drug overdose - unintentional- (present on admission)   Assessment & Plan    Buprenorphine patch removed PTA.   Patient was on heavy doses of narcotics at home for chronic pain vs fibromyalgia  Denies suicidal ideation or history  Wean down narcotic as tolerated.             Normocytic anemia- (present on admission)   Assessment & Plan    Monitor H&H        Seizure disorder as sequela of cerebrovascular accident (CMS-[facility])- (present on admission)   Assessment & Plan    On Keppra 1 g every 12 Iv.  On seizure precautions.  Neurology on board.        Chronic pain syndrome- (present on admission)   Assessment & Plan    Currently denies pain. Only required norco once overnight.  Wean  Norco          Hypokalemia   Assessment & Plan    On replacement.        Aspiration pneumonia (CMS-HCC)- (present on admission)   Assessment & Plan    Completed a course of Unasyn.  Repeat cxr in appox 4-6 weeks to assess for resolution.   CT chest normal        Campylobacter diarrhea- (present on admission)   Assessment & Plan    Campylobacter on cultures from transferring facility. C-diff negative here on 4/10/18.  Still having frequent bouts of watery diarrhea.   Patient has history of chronic diarrhea for years now.  Possible contribution from opioid withdrawal.          Hyperglycemia- (present on admission)   Assessment & Plan    A1C normal at 5.4. No hx of Dm.  This likely steroid induced.   Monitor blood glucoses.            Leukocytosis- (present on admission)   Assessment & Plan    WBC trending down.    Completed Unasyn and Azithro complete 4/16.  Procalcitonin was normal.  Clinically with no signs with active infection.   On Cortef which might contribute to leucocytosis.           Elevated brain natriuretic peptide (BNP) level- (present on admission)   Assessment & Plan    BNP 8000s on arrival.   TTE showed normal diastolic function & EF 70%.  No evidence of FVO on exam.            Elevated troponin- (present on admission)   Assessment & Plan    No CP or hx of CAD.  Normal echocardiogram.  Repeat trended down.         Autonomic neuropathy- (present on admission)   Assessment & Plan    Now normotensive. BP stable. Allow for permissive hypertension in light of multiple strokes.  On low dose Cortef  Midodrine was continued as the patient is getting occasional dizziness and lightheadedness.        Plan of care discussed with multidisciplinary team.   Quality-Core Measures   Reviewed items::  Labs reviewed, Medications reviewed, Radiology images reviewed and EKG reviewed  Bishop catheter::  No Bishop  DVT prophylaxis pharmacological::  Heparin  : JESSICA hose bilaterally.  Ulcer Prophylaxis::  Not indicated  Assessed  for rehabilitation services:  Patient was assess for and/or received rehabilitation services during this hospitalization

## 2018-04-22 NOTE — PROGRESS NOTES
[facility] Hospitalist Progress Note    Date of Service: 2018    Chief Complaint  53 y.o. female admitted 4/10/2018 for AMS presumably due to opiate OD, as she responded to narcan. Found to have CVA in addition to campylobacter infection in her stool. She has an abnormal EEG.    Interval Problem Update  Less dizzy today. Looked depressed. Poor appetite.    Consultants/Specialty  Neurology    Disposition  Rehab order placed. Family wanting to find a place closer to [place], [place] where the patient & her family live.          Review of Systems   Constitutional: Negative for fever and weight loss.   HENT: Negative for ear discharge, hearing loss and tinnitus.    Eyes: Negative for blurred vision, double vision and photophobia.   Respiratory: Negative for cough, hemoptysis and sputum production.    Cardiovascular: Negative for chest pain, palpitations and orthopnea.   Gastrointestinal: Negative for abdominal pain, heartburn and nausea. Diarrhea: chronic    Genitourinary: Negative for dysuria, frequency and urgency.   Musculoskeletal: Positive for back pain, myalgias and neck pain.        Denies pain   Skin: Negative for itching.   Neurological: Positive for dizziness (resolving.). Negative for tremors, sensory change and headaches. Speech change: Resolving    Endo/Heme/Allergies: Negative for environmental allergies. Does not bruise/bleed easily.   Psychiatric/Behavioral: Positive for substance abuse. Negative for depression and suicidal ideas.        Physical Exam  Laboratory/Imaging   Hemodynamics  Temp (24hrs), Av.8 °C (98.3 °F), Min:36.4 °C (97.6 °F), Max:37.2 °C (99 °F)   Temperature: 37.1 °C (98.8 °F)  Pulse  Av  Min: 12  Max: 111    Blood Pressure: 124/72      Respiratory  Respiration: 14, Pulse Oximetry: 91 %  RUL Breath Sounds: Clear, RML Breath Sounds: Clear, RLL Breath Sounds: Diminished, JOLYNN Breath Sounds: Clear, LLL Breath Sounds: Diminished    Fluids    Intake/Output Summary (Last 24 hours) at  04/22/18 1540  Last data filed at 04/22/18 1200   Gross per 24 hour   Intake              200 ml   Output                0 ml   Net              200 ml     Nutrition  Orders Placed This Encounter   Procedures   • DIET ORDER     Standing Status:   Standing     Number of Occurrences:   1     Order Specific Question:   Diet:     Answer:   Regular [1]     Order Specific Question:   Texture/Fiber modifications:     Answer:   Dysphagia 3(Mechanical Soft)specify fluid consistency(question 6) [3]     Order Specific Question:   Consistency/Fluid modifications:     Answer:   Nectar Thick [2]     Physical Exam   Constitutional: She is oriented to person, place, and time. She appears well-developed and well-nourished. She is cooperative. No distress.   Thin     HENT:   Head: Normocephalic and atraumatic.   Mouth/Throat: No oropharyngeal exudate.   Eyes: Conjunctivae and EOM are normal. Pupils are equal, round, and reactive to light. No scleral icterus. Right eye exhibits no nystagmus. Left eye exhibits no nystagmus. Right pupil is round and reactive. Left pupil is round and reactive. Pupils are equal.   Neck: Neck supple. No edema present. No thyromegaly present.   Cardiovascular: Normal rate and regular rhythm.  Exam reveals no gallop and no friction rub.    SR on tele monitoring     Pulmonary/Chest: Effort normal and breath sounds normal. No tachypnea. No respiratory distress. She has no decreased breath sounds. She has no wheezes. She has no rhonchi.   Not on O2   Abdominal: Soft. Normal appearance. She exhibits no distension and no abdominal bruit. There is no tenderness. There is no guarding.   Musculoskeletal: Normal range of motion. She exhibits no tenderness or deformity.   Neurological: She is alert and oriented to person, place, and time. She has normal strength. No cranial nerve deficit or sensory deficit. She displays no seizure activity. GCS eye subscore is 4. GCS verbal subscore is 4. GCS motor subscore is 6.    Difficult to assess.    Skin: Skin is warm and dry. No erythema.   Psychiatric: Her speech is delayed. She is slowed and withdrawn. She exhibits a depressed mood.   Poor insight.    Nursing note and vitals reviewed.                             Assessment/Plan     * Stroke syndrome (CMS-HCC)   Assessment & Plan    Multifocal infarctions, largest in the left frontal lobe, left posterior occipital lobe, right parietal-occipital cortex, and right hemicerebellum, respectively  Neuro following.  Echo unremarkable. EF 70%. No bubble study. Discussed with Neurology.    Carotid US normal.  LACI w bubble study showed PFO.  Neurology was discussed with the patient the option of surgical repair versus being on medical treatment with aspirin.  Neurologist recommended outpatient follow-up with cardiology if patient decided to proceed with surgical intervention. Otherwise, the management will be the same as patient receiving here.  Pending rehab acceptance.              Drug overdose - unintentional- (present on admission)   Assessment & Plan    Buprenorphine patch removed PTA.   Patient was on heavy doses of narcotics at home for chronic pain vs fibromyalgia  Denies suicidal ideation or history  Wean down narcotic as tolerated.             Anorexia- (present on admission)   Assessment & Plan    Patient is depressed and anorexic. We'll add Remeron.        Hypotension- (present on admission)   Assessment & Plan    Continue Cortef and Midodrine.         Normocytic anemia- (present on admission)   Assessment & Plan    Monitor H&H        Seizure disorder as sequela of cerebrovascular accident (CMS-[facility])- (present on admission)   Assessment & Plan    On Keppra 1 g every 12 Iv.  On seizure precautions.  Neurology on board.        Chronic pain syndrome- (present on admission)   Assessment & Plan    Currently denies pain. Only required norco once overnight.  Wean Norco          Hypokalemia   Assessment & Plan    On replacement.         Aspiration pneumonia (CMS-HCC)- (present on admission)   Assessment & Plan    Completed a course of Unasyn.  Repeat cxr in appox 4-6 weeks to assess for resolution.   CT chest normal        Campylobacter diarrhea- (present on admission)   Assessment & Plan    Campylobacter on cultures from transferring facility. C-diff negative here on 4/10/18.  Still having frequent bouts of watery diarrhea.   Patient has history of chronic diarrhea for years now.  Possible contribution from opioid withdrawal.          Hyperglycemia- (present on admission)   Assessment & Plan    A1C normal at 5.4. No hx of Dm.  This likely steroid induced.   Monitor blood glucoses.            Leukocytosis- (present on admission)   Assessment & Plan    WBC trending down.    Completed Unasyn and Azithro complete 4/16.  Procalcitonin was normal.  Clinically with no signs with active infection.   On Cortef which might contribute to leucocytosis.           Elevated brain natriuretic peptide (BNP) level- (present on admission)   Assessment & Plan    BNP 8000s on arrival.   TTE showed normal diastolic function & EF 70%.  No evidence of FVO on exam.            Elevated troponin- (present on admission)   Assessment & Plan    No CP or hx of CAD.  Normal echocardiogram.  Repeat trended down.         Autonomic neuropathy- (present on admission)   Assessment & Plan    Now normotensive. BP stable. Allow for permissive hypertension in light of multiple strokes.  On low dose Cortef  Midodrine was continued as the patient is getting occasional dizziness and lightheadedness.        Plan of care discussed with multidisciplinary team.   Quality-Core Measures   Reviewed items::  Labs reviewed, Medications reviewed, Radiology images reviewed and EKG reviewed  Bishop catheter::  No Bishop  DVT prophylaxis pharmacological::  Heparin  : JESSICA hose bilaterally.  Ulcer Prophylaxis::  Not indicated  Assessed for rehabilitation services:  Patient was assess for and/or received  rehabilitation services during this hospitalization

## 2018-04-22 NOTE — CARE PLAN
Problem: Fluid Volume:  Goal: Will maintain balanced intake and output    Intervention: Monitor, educate, and encourage compliance with therapeutic intake of liquids  Educated pt on importance of hydrating herself to help with her BP. Encouraged to drink more while awake.

## 2018-04-22 NOTE — PROGRESS NOTES
Assisted pt up to restroom several times. Pt still weak and complains of being dizzy. Pt not been eating much. Encouraged pt to eat more and to be out of bed more during the day. Got pt soup and ice cream from cafeteria. Pt eat most of potato soup and 1/2 of ice cream. Hourly rounds in place. Bed lowered, locked and call light within reach.

## 2018-04-22 NOTE — ASSESSMENT & PLAN NOTE
Has had chronic intermittent episodes of unresponsiveness while ambulating secondary to orthostatic hypotension.   None recently  Continue midodrine  Slow taper of hydrocortisone, bp improved, no recent dizziness

## 2018-04-23 ENCOUNTER — APPOINTMENT (OUTPATIENT)
Dept: RADIOLOGY | Facility: MEDICAL CENTER | Age: 53
DRG: 917 | End: 2018-04-23
Attending: FAMILY MEDICINE
Payer: COMMERCIAL

## 2018-04-23 LAB
ALBUMIN SERPL BCP-MCNC: 3.8 G/DL (ref 3.2–4.9)
ALBUMIN/GLOB SERPL: 1.4 G/DL
ALP SERPL-CCNC: 51 U/L (ref 30–99)
ALT SERPL-CCNC: 54 U/L (ref 2–50)
ANION GAP SERPL CALC-SCNC: 7 MMOL/L (ref 0–11.9)
AST SERPL-CCNC: 21 U/L (ref 12–45)
BASOPHILS # BLD AUTO: 0.6 % (ref 0–1.8)
BASOPHILS # BLD: 0.08 K/UL (ref 0–0.12)
BILIRUB SERPL-MCNC: 0.6 MG/DL (ref 0.1–1.5)
BUN SERPL-MCNC: 28 MG/DL (ref 8–22)
CALCIUM SERPL-MCNC: 9.3 MG/DL (ref 8.5–10.5)
CHLORIDE SERPL-SCNC: 103 MMOL/L (ref 96–112)
CO2 SERPL-SCNC: 26 MMOL/L (ref 20–33)
CREAT SERPL-MCNC: 1.15 MG/DL (ref 0.5–1.4)
EOSINOPHIL # BLD AUTO: 0.01 K/UL (ref 0–0.51)
EOSINOPHIL NFR BLD: 0.1 % (ref 0–6.9)
ERYTHROCYTE [DISTWIDTH] IN BLOOD BY AUTOMATED COUNT: 50.4 FL (ref 35.9–50)
GLOBULIN SER CALC-MCNC: 2.7 G/DL (ref 1.9–3.5)
GLUCOSE SERPL-MCNC: 128 MG/DL (ref 65–99)
HCT VFR BLD AUTO: 44.4 % (ref 37–47)
HGB BLD-MCNC: 14.7 G/DL (ref 12–16)
IMM GRANULOCYTES # BLD AUTO: 0.05 K/UL (ref 0–0.11)
IMM GRANULOCYTES NFR BLD AUTO: 0.4 % (ref 0–0.9)
LYMPHOCYTES # BLD AUTO: 0.77 K/UL (ref 1–4.8)
LYMPHOCYTES NFR BLD: 5.4 % (ref 22–41)
MCH RBC QN AUTO: 30.6 PG (ref 27–33)
MCHC RBC AUTO-ENTMCNC: 33.1 G/DL (ref 33.6–35)
MCV RBC AUTO: 92.3 FL (ref 81.4–97.8)
MONOCYTES # BLD AUTO: 1 K/UL (ref 0–0.85)
MONOCYTES NFR BLD AUTO: 7.1 % (ref 0–13.4)
NEUTROPHILS # BLD AUTO: 12.25 K/UL (ref 2–7.15)
NEUTROPHILS NFR BLD: 86.4 % (ref 44–72)
NRBC # BLD AUTO: 0 K/UL
NRBC BLD-RTO: 0 /100 WBC
PLATELET # BLD AUTO: 307 K/UL (ref 164–446)
PMV BLD AUTO: 10.8 FL (ref 9–12.9)
POTASSIUM SERPL-SCNC: 4.8 MMOL/L (ref 3.6–5.5)
PROT SERPL-MCNC: 6.5 G/DL (ref 6–8.2)
RBC # BLD AUTO: 4.81 M/UL (ref 4.2–5.4)
SODIUM SERPL-SCNC: 136 MMOL/L (ref 135–145)
WBC # BLD AUTO: 14.2 K/UL (ref 4.8–10.8)

## 2018-04-23 PROCEDURE — 700102 HCHG RX REV CODE 250 W/ 637 OVERRIDE(OP): Performed by: NURSE PRACTITIONER

## 2018-04-23 PROCEDURE — 700102 HCHG RX REV CODE 250 W/ 637 OVERRIDE(OP): Performed by: FAMILY MEDICINE

## 2018-04-23 PROCEDURE — 700111 HCHG RX REV CODE 636 W/ 250 OVERRIDE (IP): Performed by: INTERNAL MEDICINE

## 2018-04-23 PROCEDURE — A9270 NON-COVERED ITEM OR SERVICE: HCPCS | Performed by: INTERNAL MEDICINE

## 2018-04-23 PROCEDURE — 92526 ORAL FUNCTION THERAPY: CPT

## 2018-04-23 PROCEDURE — 80053 COMPREHEN METABOLIC PANEL: CPT

## 2018-04-23 PROCEDURE — 700101 HCHG RX REV CODE 250: Performed by: FAMILY MEDICINE

## 2018-04-23 PROCEDURE — 36415 COLL VENOUS BLD VENIPUNCTURE: CPT

## 2018-04-23 PROCEDURE — A9270 NON-COVERED ITEM OR SERVICE: HCPCS | Performed by: FAMILY MEDICINE

## 2018-04-23 PROCEDURE — 700102 HCHG RX REV CODE 250 W/ 637 OVERRIDE(OP): Performed by: INTERNAL MEDICINE

## 2018-04-23 PROCEDURE — A9270 NON-COVERED ITEM OR SERVICE: HCPCS | Performed by: NURSE PRACTITIONER

## 2018-04-23 PROCEDURE — 71045 X-RAY EXAM CHEST 1 VIEW: CPT

## 2018-04-23 PROCEDURE — 770006 HCHG ROOM/CARE - MED/SURG/GYN SEMI*

## 2018-04-23 PROCEDURE — 85025 COMPLETE CBC W/AUTO DIFF WBC: CPT

## 2018-04-23 PROCEDURE — 99233 SBSQ HOSP IP/OBS HIGH 50: CPT | Performed by: FAMILY MEDICINE

## 2018-04-23 RX ORDER — DEXTROSE MONOHYDRATE, SODIUM CHLORIDE, AND POTASSIUM CHLORIDE 50; 1.49; 4.5 G/1000ML; G/1000ML; G/1000ML
INJECTION, SOLUTION INTRAVENOUS CONTINUOUS
Status: ACTIVE | OUTPATIENT
Start: 2018-04-23 | End: 2018-04-24

## 2018-04-23 RX ADMIN — ACETAMINOPHEN 650 MG: 325 TABLET, FILM COATED ORAL at 10:58

## 2018-04-23 RX ADMIN — MIDODRINE HYDROCHLORIDE 10 MG: 5 TABLET ORAL at 08:38

## 2018-04-23 RX ADMIN — LORAZEPAM 0.5 MG: 2 INJECTION INTRAMUSCULAR; INTRAVENOUS at 04:19

## 2018-04-23 RX ADMIN — MIDODRINE HYDROCHLORIDE 10 MG: 5 TABLET ORAL at 10:58

## 2018-04-23 RX ADMIN — MICONAZOLE NITRATE: 20 CREAM TOPICAL at 08:39

## 2018-04-23 RX ADMIN — POTASSIUM CHLORIDE 20 MEQ: 1500 TABLET, EXTENDED RELEASE ORAL at 20:55

## 2018-04-23 RX ADMIN — HYDROCODONE BITARTRATE AND ACETAMINOPHEN 1 TABLET: 5; 325 TABLET ORAL at 14:48

## 2018-04-23 RX ADMIN — HEPARIN SODIUM 5000 UNITS: 5000 INJECTION, SOLUTION INTRAVENOUS; SUBCUTANEOUS at 14:48

## 2018-04-23 RX ADMIN — HYDROCORTISONE 5 MG: 5 TABLET ORAL at 08:38

## 2018-04-23 RX ADMIN — HEPARIN SODIUM 5000 UNITS: 5000 INJECTION, SOLUTION INTRAVENOUS; SUBCUTANEOUS at 04:45

## 2018-04-23 RX ADMIN — ACETAMINOPHEN 650 MG: 325 TABLET, FILM COATED ORAL at 16:53

## 2018-04-23 RX ADMIN — MIRTAZAPINE 15 MG: 15 TABLET, FILM COATED ORAL at 20:55

## 2018-04-23 RX ADMIN — MIDODRINE HYDROCHLORIDE 10 MG: 5 TABLET ORAL at 16:53

## 2018-04-23 RX ADMIN — HYDROCODONE BITARTRATE AND ACETAMINOPHEN 1 TABLET: 5; 325 TABLET ORAL at 08:39

## 2018-04-23 RX ADMIN — PREGABALIN 150 MG: 75 CAPSULE ORAL at 08:38

## 2018-04-23 RX ADMIN — POTASSIUM CHLORIDE, DEXTROSE MONOHYDRATE AND SODIUM CHLORIDE: 150; 5; 450 INJECTION, SOLUTION INTRAVENOUS at 21:02

## 2018-04-23 RX ADMIN — ASPIRIN 81 MG: 81 TABLET, COATED ORAL at 08:38

## 2018-04-23 RX ADMIN — MICONAZOLE NITRATE: 20 CREAM TOPICAL at 14:49

## 2018-04-23 RX ADMIN — LEVETIRACETAM 1000 MG: 500 TABLET, FILM COATED ORAL at 08:38

## 2018-04-23 RX ADMIN — HYDROCORTISONE 5 MG: 5 TABLET ORAL at 20:54

## 2018-04-23 RX ADMIN — POTASSIUM CHLORIDE, DEXTROSE MONOHYDRATE AND SODIUM CHLORIDE: 150; 5; 450 INJECTION, SOLUTION INTRAVENOUS at 10:59

## 2018-04-23 RX ADMIN — POTASSIUM CHLORIDE 20 MEQ: 1500 TABLET, EXTENDED RELEASE ORAL at 08:38

## 2018-04-23 RX ADMIN — LEVETIRACETAM 1000 MG: 500 TABLET, FILM COATED ORAL at 20:55

## 2018-04-23 RX ADMIN — PREGABALIN 150 MG: 75 CAPSULE ORAL at 20:54

## 2018-04-23 RX ADMIN — MICONAZOLE NITRATE: 20 CREAM TOPICAL at 21:00

## 2018-04-23 RX ADMIN — HEPARIN SODIUM 5000 UNITS: 5000 INJECTION, SOLUTION INTRAVENOUS; SUBCUTANEOUS at 20:57

## 2018-04-23 RX ADMIN — HYDROCODONE BITARTRATE AND ACETAMINOPHEN 1 TABLET: 5; 325 TABLET ORAL at 20:55

## 2018-04-23 ASSESSMENT — PAIN SCALES - GENERAL
PAINLEVEL_OUTOF10: 10
PAINLEVEL_OUTOF10: 7
PAINLEVEL_OUTOF10: 6
PAINLEVEL_OUTOF10: 7
PAINLEVEL_OUTOF10: 8

## 2018-04-23 NOTE — CARE PLAN
Problem: Skin Integrity  Goal: Risk for impaired skin integrity will decrease  Outcome: PROGRESSING AS EXPECTED  Encouraging mobilization and turning every 2 hours. Educated about preventing skin break down and pt verbalizes understanding. Encouraging pt to reposition every 2 hours. Barrier wipes, TOM cream, and lotion in use.     Problem: Mobility  Goal: Risk for activity intolerance will decrease  Outcome: PROGRESSING AS EXPECTED  Pt ambulated [address] way with FWW and SBA, c/o dizziness

## 2018-04-23 NOTE — PROGRESS NOTES
Pt alert, off date by 3 days. Pt c/o generalized pain and HA, medicated per MAR, pt then asking for more narcotics, educated pt Tylenol is available, pt refused non pharm interventions. Pt tolerating diet, -N/V, encouraging increase PO intake, ambulated brig[address] way with SBA and FWW, tires quickly, c/o dizziness during ambulation, resolved with laying down. BSx4 hyperactive, pt reports loose BM today and declines Imodium.

## 2018-04-23 NOTE — DISCHARGE PLANNING
Received choice form from Polly). Referral sent to all [facility] on [facility].

## 2018-04-23 NOTE — PROGRESS NOTES
Pt is alert and oriented x4, w/ mild exp. Noted. Reports fibromyalgic pain, medicated per MAR. Currently on 3L via mask post seizure like activity consisting of shaking in the lower extremities. Pt was alert and oriented during the seizure like activity, post ictal phase was not apparent. MD notified, received order for EEG. Pt tolerating diet. Encouraging oral intake and snacking between meals. Rash on buttock noted, Dillon ointment available. Ambulates with sba to 1 assist, pt at high risk for orthostatic hypotension. Frame alarm on, call light w/in reach, hourly rounding in place.

## 2018-04-23 NOTE — DISCHARGE PLANNING
SW met with pt at bedside to go over SNF choice. Pt agreeable to local referrals.     Pt approved for SNF [facility].     Pt signed choice for [facility] referral. Faxed to [facility].

## 2018-04-23 NOTE — CARE PLAN
Progressing as expecting.    Problem: Fluid Volume:  Goal: Will maintain balanced intake and output    Intervention: Monitor, educate, and encourage compliance with therapeutic intake of liquids  Pt encouraged to increase intake of fluids to reduce the risk of hypotension.      Problem: Skin Integrity  Goal: Risk for impaired skin integrity will decrease    Intervention: Implement precautions to protect skin integrity in collaboration with the interdisciplinary team  Dillon ointment applied to bottom.

## 2018-04-23 NOTE — THERAPY
"Speech Language Therapy dysphagia treatment completed.     Functional Status:  Pt was seen for dysphagia therapy on this date. She was awake, alert, and agreeable to PO trials from lunch tray (D2/NTL) as well as trials of thins. Pt exhibited wet/gurgly vocal quality upon entering and required cueing from SLP to clear throat. All trials were consumed with no overt s/sx of aspiration. Single sips of thins were consumed via side of cup without difficulty. Pt declined sequential sips of thins via straw. Pt exhibited word finding difficulty when engaging in conversation with SLP.     Recommendations: Recommend a Dysphagia III w/ Thin Liquids diet at this time. SLP following for dysphagia therapy.    Plan of Care: Will benefit from Speech Therapy 5 times per week  Post-Acute Therapy: Discharge to a transitional care facility for continued skilled therapy services.    See \"Rehab Therapy-Acute\" Patient Summary Report for complete documentation.     "

## 2018-04-23 NOTE — DISCHARGE PLANNING
[facility], [facility] and [facility] have declined patient as they are  Not accepting LOAs at this time.

## 2018-04-23 NOTE — DISCHARGE PLANNING
[facility], [facility], and [facility] have declined patient as they are not accepting LOAs at this time. [facility] has declined patient as they are not accepting LOAs at this time and patient is [place] resident.

## 2018-04-23 NOTE — CARE PLAN
Problem: Nutritional:  Goal: Achieve adequate nutritional intake  Patient will tolerate po diet with at least 50% intake of meals.   Outcome: PROGRESSING AS EXPECTED  Pt states her appetite is okay. Pt reports she is drinking the Boost Plus and smoothies. Declined changing snacks. Per chart pt PO < %. RD will continue to follow.

## 2018-04-24 LAB — LACTATE BLD-SCNC: 1.1 MMOL/L (ref 0.5–2)

## 2018-04-24 PROCEDURE — A9270 NON-COVERED ITEM OR SERVICE: HCPCS | Performed by: FAMILY MEDICINE

## 2018-04-24 PROCEDURE — 97530 THERAPEUTIC ACTIVITIES: CPT

## 2018-04-24 PROCEDURE — 770006 HCHG ROOM/CARE - MED/SURG/GYN SEMI*

## 2018-04-24 PROCEDURE — 87040 BLOOD CULTURE FOR BACTERIA: CPT

## 2018-04-24 PROCEDURE — 700101 HCHG RX REV CODE 250: Performed by: FAMILY MEDICINE

## 2018-04-24 PROCEDURE — 36415 COLL VENOUS BLD VENIPUNCTURE: CPT

## 2018-04-24 PROCEDURE — 97116 GAIT TRAINING THERAPY: CPT

## 2018-04-24 PROCEDURE — 700102 HCHG RX REV CODE 250 W/ 637 OVERRIDE(OP): Performed by: FAMILY MEDICINE

## 2018-04-24 PROCEDURE — 700102 HCHG RX REV CODE 250 W/ 637 OVERRIDE(OP): Performed by: NURSE PRACTITIONER

## 2018-04-24 PROCEDURE — 95951 EEG: CPT | Mod: 52

## 2018-04-24 PROCEDURE — 700111 HCHG RX REV CODE 636 W/ 250 OVERRIDE (IP): Performed by: INTERNAL MEDICINE

## 2018-04-24 PROCEDURE — 700102 HCHG RX REV CODE 250 W/ 637 OVERRIDE(OP): Performed by: INTERNAL MEDICINE

## 2018-04-24 PROCEDURE — A9270 NON-COVERED ITEM OR SERVICE: HCPCS | Performed by: INTERNAL MEDICINE

## 2018-04-24 PROCEDURE — 99233 SBSQ HOSP IP/OBS HIGH 50: CPT | Performed by: INTERNAL MEDICINE

## 2018-04-24 PROCEDURE — A9270 NON-COVERED ITEM OR SERVICE: HCPCS | Performed by: NURSE PRACTITIONER

## 2018-04-24 PROCEDURE — 83605 ASSAY OF LACTIC ACID: CPT

## 2018-04-24 RX ADMIN — MIDODRINE HYDROCHLORIDE 10 MG: 5 TABLET ORAL at 16:05

## 2018-04-24 RX ADMIN — HYDROCORTISONE 5 MG: 5 TABLET ORAL at 07:41

## 2018-04-24 RX ADMIN — HEPARIN SODIUM 5000 UNITS: 5000 INJECTION, SOLUTION INTRAVENOUS; SUBCUTANEOUS at 20:50

## 2018-04-24 RX ADMIN — ACETAMINOPHEN 650 MG: 325 TABLET, FILM COATED ORAL at 12:56

## 2018-04-24 RX ADMIN — HEPARIN SODIUM 5000 UNITS: 5000 INJECTION, SOLUTION INTRAVENOUS; SUBCUTANEOUS at 12:56

## 2018-04-24 RX ADMIN — HYDROCODONE BITARTRATE AND ACETAMINOPHEN 1 TABLET: 5; 325 TABLET ORAL at 20:47

## 2018-04-24 RX ADMIN — HYDROCORTISONE 5 MG: 5 TABLET ORAL at 20:49

## 2018-04-24 RX ADMIN — HYDROCODONE BITARTRATE AND ACETAMINOPHEN 1 TABLET: 5; 325 TABLET ORAL at 16:05

## 2018-04-24 RX ADMIN — MIRTAZAPINE 15 MG: 15 TABLET, FILM COATED ORAL at 20:47

## 2018-04-24 RX ADMIN — LEVETIRACETAM 1000 MG: 500 TABLET, FILM COATED ORAL at 07:41

## 2018-04-24 RX ADMIN — MIDODRINE HYDROCHLORIDE 10 MG: 5 TABLET ORAL at 12:56

## 2018-04-24 RX ADMIN — PREGABALIN 150 MG: 75 CAPSULE ORAL at 20:46

## 2018-04-24 RX ADMIN — ASPIRIN 81 MG: 81 TABLET, COATED ORAL at 07:41

## 2018-04-24 RX ADMIN — HEPARIN SODIUM 5000 UNITS: 5000 INJECTION, SOLUTION INTRAVENOUS; SUBCUTANEOUS at 04:37

## 2018-04-24 RX ADMIN — PREGABALIN 150 MG: 75 CAPSULE ORAL at 07:41

## 2018-04-24 RX ADMIN — POTASSIUM CHLORIDE 20 MEQ: 1500 TABLET, EXTENDED RELEASE ORAL at 07:41

## 2018-04-24 RX ADMIN — POTASSIUM CHLORIDE, DEXTROSE MONOHYDRATE AND SODIUM CHLORIDE: 150; 5; 450 INJECTION, SOLUTION INTRAVENOUS at 04:44

## 2018-04-24 RX ADMIN — HYDROCODONE BITARTRATE AND ACETAMINOPHEN 1 TABLET: 5; 325 TABLET ORAL at 07:41

## 2018-04-24 RX ADMIN — POTASSIUM CHLORIDE 20 MEQ: 1500 TABLET, EXTENDED RELEASE ORAL at 20:47

## 2018-04-24 RX ADMIN — ACETAMINOPHEN 650 MG: 325 TABLET, FILM COATED ORAL at 04:36

## 2018-04-24 RX ADMIN — LEVETIRACETAM 1000 MG: 500 TABLET, FILM COATED ORAL at 20:48

## 2018-04-24 RX ADMIN — MIDODRINE HYDROCHLORIDE 10 MG: 5 TABLET ORAL at 07:41

## 2018-04-24 ASSESSMENT — PAIN SCALES - GENERAL
PAINLEVEL_OUTOF10: 5
PAINLEVEL_OUTOF10: 5
PAINLEVEL_OUTOF10: 6
PAINLEVEL_OUTOF10: 0
PAINLEVEL_OUTOF10: 5
PAINLEVEL_OUTOF10: 6
PAINLEVEL_OUTOF10: 6
PAINLEVEL_OUTOF10: 7
PAINLEVEL_OUTOF10: 6

## 2018-04-24 ASSESSMENT — ENCOUNTER SYMPTOMS
DIZZINESS: 0
HEARTBURN: 0
BRUISES/BLEEDS EASILY: 0
DEPRESSION: 0
NECK PAIN: 0
BLURRED VISION: 0
HEADACHES: 0
FEVER: 0
MYALGIAS: 0
FOCAL WEAKNESS: 0
CHILLS: 0
PALPITATIONS: 0

## 2018-04-24 ASSESSMENT — COGNITIVE AND FUNCTIONAL STATUS - GENERAL
WALKING IN HOSPITAL ROOM: A LITTLE
MOBILITY SCORE: 21
STANDING UP FROM CHAIR USING ARMS: A LITTLE
SUGGESTED CMS G CODE MODIFIER MOBILITY: CJ
CLIMB 3 TO 5 STEPS WITH RAILING: A LITTLE

## 2018-04-24 ASSESSMENT — GAIT ASSESSMENTS
GAIT LEVEL OF ASSIST: CONTACT GUARD ASSIST
DEVIATION: DECREASED BASE OF SUPPORT;SHUFFLED GAIT
ASSISTIVE DEVICE: FRONT WHEEL WALKER
DISTANCE (FEET): 50

## 2018-04-24 NOTE — PROGRESS NOTES
Pt is alert and oriented x4, w/ mild exp. Noted. Reports fibromyalgic pain, medicated per MAR. Pt tolerating diet. Encouraging oral intake and snacking between meals. Rash on buttock noted, Dillon ointment available which refused this am. Ambulates with 1 assist as pt is  at high risk for orthostatic hypotension. Frame alarm on, call light w/in reach, hourly rounding in place.

## 2018-04-24 NOTE — THERAPY
"Physical Therapy Treatment completed.   Bed Mobility:  Supine to Sit: Modified Independent  Transfers: Sit to Stand: Stand by Assist  Gait: Level Of Assist: Contact Guard Assist with Front-Wheel Walker       Plan of Care: Will benefit from Physical Therapy 3 times per week  Discharge Recommendations: Equipment: Will Continue to Assess for Equipment Needs.     See \"Rehab Therapy-Acute\" Patient Summary Report for complete documentation.     Pt is progressing slower than expected, as the patient demonstrated impaired balance, and imparied activity tolerance at this time. Pt was able to demonstrate SBA to CGA for all functional mobility at this time w/FWW use. Pt was primarily limited due to imparied activity tolerance as the patient c/o continued dizziness/lightheadedness, and demonstrated with a BP of 78/48 sitting EOB and 61/41 after ambulation. Deferred further activity secondary to low BP concerns and RN was notified. Pt was able to ambulate to doorway and hurried back secondary to worsening symptoms. Will continue to follow as appropriate, with reocmmendation for post acute thearpy services at this time given current objective findings and limited activity tolerance.     "

## 2018-04-24 NOTE — PROGRESS NOTES
Pt A&Ox4, flat affect noted, c/o 6/10 generalized pain.  and seizure precautions in place, sats mid 90s on RA. No seizure like episodes so far this evening. Pt tolerating diet, swallowing pills whole without difficulty with thin liquids, -N/V. Pt tolerating ambulating up to bathroom with CNA with 1 assist. Educated pt about safety and preventing falls due to her episodes, pt refuses to use bed pan or BSC. CXR completed, pending results.

## 2018-04-24 NOTE — CARE PLAN
Problem: Skin Integrity  Goal: Risk for impaired skin integrity will decrease  Outcome: PROGRESSING AS EXPECTED  Encouraging mobilization and turning every 2 hours. Educated about preventing skin break down and pt verbalizes understanding. Encouraging pt to reposition every 2 hours. Barrier wipes, TOM cream, and lotion in use.     Problem: Mobility  Goal: Risk for activity intolerance will decrease  Outcome: PROGRESSING AS EXPECTED  Pt tolerating up to bathroom with 1 assist with CNA

## 2018-04-24 NOTE — DISCHARGE PLANNING
NAZANIN received call from pt's daughterLing requesting update on d/c plan. SW informed daughter that referrals have been sent to local SNFs. Referrals pending.     Daughter notified SW that pt reported significant weight loss during this admission. Daughter is concerned because pt dropped to 90lbs in 2014 during a year long admission at [facility] and had Cortrak placement.     SW to notify MD of daughter's concern.

## 2018-04-24 NOTE — PROGRESS NOTES
"Pt adamant about ambulating up to the bathroom, refusing bed pan and BSC again. Pt ambulated with SBA, voided without difficulty. After washing hands, pt began to shake her arms and feel weak, pt was sat on the floor and assistance was called. RN asked pt to stand up, pt responded \"what?\" and refused to stand. Brittany sparks RN arrived in bathroom, asked pt to stand up and pt was immediately able to stand up and ambulated a few steps back to bed. Pt at one point stopped ambulating and became weak again, Brittany CHRISTIE asked pt again to walk and pt was safely brought back to her bed. VSS, /71, HR 81, 96% on RA, pt smiling in bed with no seizure like activity.   "

## 2018-04-25 PROBLEM — R53.1 WEAKNESS: Status: ACTIVE | Noted: 2018-04-25

## 2018-04-25 PROCEDURE — 700102 HCHG RX REV CODE 250 W/ 637 OVERRIDE(OP): Performed by: NURSE PRACTITIONER

## 2018-04-25 PROCEDURE — A9270 NON-COVERED ITEM OR SERVICE: HCPCS | Performed by: INTERNAL MEDICINE

## 2018-04-25 PROCEDURE — 700102 HCHG RX REV CODE 250 W/ 637 OVERRIDE(OP): Performed by: INTERNAL MEDICINE

## 2018-04-25 PROCEDURE — A9270 NON-COVERED ITEM OR SERVICE: HCPCS | Performed by: NURSE PRACTITIONER

## 2018-04-25 PROCEDURE — 99233 SBSQ HOSP IP/OBS HIGH 50: CPT | Performed by: INTERNAL MEDICINE

## 2018-04-25 PROCEDURE — 92526 ORAL FUNCTION THERAPY: CPT

## 2018-04-25 PROCEDURE — 700102 HCHG RX REV CODE 250 W/ 637 OVERRIDE(OP): Performed by: HOSPITALIST

## 2018-04-25 PROCEDURE — A9270 NON-COVERED ITEM OR SERVICE: HCPCS | Performed by: FAMILY MEDICINE

## 2018-04-25 PROCEDURE — 770006 HCHG ROOM/CARE - MED/SURG/GYN SEMI*

## 2018-04-25 PROCEDURE — 700111 HCHG RX REV CODE 636 W/ 250 OVERRIDE (IP): Performed by: INTERNAL MEDICINE

## 2018-04-25 PROCEDURE — 700102 HCHG RX REV CODE 250 W/ 637 OVERRIDE(OP): Performed by: FAMILY MEDICINE

## 2018-04-25 RX ADMIN — MICONAZOLE NITRATE: 20 CREAM TOPICAL at 02:00

## 2018-04-25 RX ADMIN — HYDROCORTISONE 5 MG: 5 TABLET ORAL at 09:18

## 2018-04-25 RX ADMIN — PREGABALIN 150 MG: 75 CAPSULE ORAL at 09:29

## 2018-04-25 RX ADMIN — ASPIRIN 81 MG: 81 TABLET, COATED ORAL at 09:18

## 2018-04-25 RX ADMIN — MIDODRINE HYDROCHLORIDE 10 MG: 5 TABLET ORAL at 11:51

## 2018-04-25 RX ADMIN — HEPARIN SODIUM 5000 UNITS: 5000 INJECTION, SOLUTION INTRAVENOUS; SUBCUTANEOUS at 05:15

## 2018-04-25 RX ADMIN — HYDROCODONE BITARTRATE AND ACETAMINOPHEN 1 TABLET: 5; 325 TABLET ORAL at 15:45

## 2018-04-25 RX ADMIN — HYDROCORTISONE 5 MG: 5 TABLET ORAL at 21:07

## 2018-04-25 RX ADMIN — MIRTAZAPINE 15 MG: 15 TABLET, FILM COATED ORAL at 20:59

## 2018-04-25 RX ADMIN — HEPARIN SODIUM 5000 UNITS: 5000 INJECTION, SOLUTION INTRAVENOUS; SUBCUTANEOUS at 15:45

## 2018-04-25 RX ADMIN — HEPARIN SODIUM 5000 UNITS: 5000 INJECTION, SOLUTION INTRAVENOUS; SUBCUTANEOUS at 20:58

## 2018-04-25 RX ADMIN — LEVETIRACETAM 1000 MG: 500 TABLET, FILM COATED ORAL at 20:58

## 2018-04-25 RX ADMIN — PREGABALIN 150 MG: 75 CAPSULE ORAL at 20:59

## 2018-04-25 RX ADMIN — MIDODRINE HYDROCHLORIDE 10 MG: 5 TABLET ORAL at 09:17

## 2018-04-25 RX ADMIN — POTASSIUM CHLORIDE 20 MEQ: 1500 TABLET, EXTENDED RELEASE ORAL at 20:58

## 2018-04-25 RX ADMIN — MICONAZOLE NITRATE: 20 CREAM TOPICAL at 21:02

## 2018-04-25 RX ADMIN — ACETAMINOPHEN 650 MG: 325 TABLET, FILM COATED ORAL at 05:16

## 2018-04-25 RX ADMIN — MIDODRINE HYDROCHLORIDE 10 MG: 5 TABLET ORAL at 17:23

## 2018-04-25 RX ADMIN — HYDROCODONE BITARTRATE AND ACETAMINOPHEN 1 TABLET: 5; 325 TABLET ORAL at 09:17

## 2018-04-25 RX ADMIN — POTASSIUM CHLORIDE 20 MEQ: 1500 TABLET, EXTENDED RELEASE ORAL at 09:18

## 2018-04-25 RX ADMIN — LEVETIRACETAM 1000 MG: 500 TABLET, FILM COATED ORAL at 09:18

## 2018-04-25 RX ADMIN — HYDROCODONE BITARTRATE AND ACETAMINOPHEN 1 TABLET: 5; 325 TABLET ORAL at 20:59

## 2018-04-25 ASSESSMENT — PAIN SCALES - GENERAL
PAINLEVEL_OUTOF10: 1
PAINLEVEL_OUTOF10: 0
PAINLEVEL_OUTOF10: 0
PAINLEVEL_OUTOF10: 4
PAINLEVEL_OUTOF10: 0

## 2018-04-25 ASSESSMENT — ENCOUNTER SYMPTOMS
FEVER: 0
NECK PAIN: 0
COUGH: 0
DEPRESSION: 0
DIZZINESS: 0
MYALGIAS: 0
BLURRED VISION: 0
CHILLS: 0
HEARTBURN: 0
PALPITATIONS: 0
NAUSEA: 0

## 2018-04-25 NOTE — DIETARY
"Nutrition Assessment/ Calorie Count consult    Nutrition services:   Day 15 of admit.  Shasta Dunn is a 53 y.o. female with admitting DX of AMS presumably due to opiate OD.     Further pertinent diagnosis per MD notes:   Stroke syndrome,   Hypotension,   Normocytic anemia,   Seizure disorder,   chronic pain syndrome,   Hypokalemia,   Campylobacter diarrhea,   Hyperglycemia (A1C normal at 5.4. No hx of Dm),   Leukocytosis,   Autonomic neuropathy  Anorexia  Aspiration PNA     Assessment:  Estimated Nutritional Needs: based on:   Height: 167.6 cm (5' 6\") (copied from last entry)  Weight: 51.4 kg (113 lb 5.1 oz) per bedscale on , wt fluctuates, appears to be down from admit wt of 56.2 kg/ 123 lbs per bed scale on 4/10. Will continue to monitor for trends.   Weight to Use in Calculations: 51.4 kg (113 lb 5.1 oz)  Ideal Body Weight: 59 kg (130 lb)  Percent Ideal Body Weight: 87.2  Body mass index is 18.29 kg/m².    Calculation/Equation: MSJ X 1.5=5380.   Total Calories / day: 1358 - 1439 (Calories / k-28)  Total Grams Protein / day: 62-72(Grams Protein / k.2 - 1.4)  Total Free water/day: 7374-7005 ml/day (30-35 ml/kg)     Evaluation:   1. Calorie Count consult received, pt noted to be depressed and anorexic per MD notes.   2. RD following for poor PO intake at this time with intervention in place: trying different supplements and pt's PO intake noted to have improvement on .   3. Current diet is Dysphagia 3,  Thin  Liquids with Boost Plus added to meals three times daily.   4. RN confirms pt with minimal intake, does not appear to like Boost.   5. Interviewed pt, pt states she eats ~ 50% of meals and drinks ~ one Boost/day.  6. Noted pt preferences and encouraged high protein foods.   7. Wound care seen pt on , no pressure ulcers noted.          Recommendations/Plan:  1. Will change to Boost once daily, and high protein snacks twice daily.   2. Will provide Calorie Count from Dinner  through Lunch " 4/28.  3. Please document all PO intake as percentages and place in calorie count folder on door.   4. Encourage PO intake/snacks.  5.  Nutrition Representative to see pt daily for snacks/meal preferences as appropriate with diet order.   6. RD following.

## 2018-04-25 NOTE — PROGRESS NOTES
Got patient up to bathroom. Patient voids and then brushes her teeth. Midway through brushing she leans over and starts shaking. Allowed patient to shake as she was fully responsive. I then assist patient back to bed and she begins texting as if nothing happened just now.

## 2018-04-25 NOTE — PROGRESS NOTES
Walked pt to bathroom, pt had a steady gait while walking to the bathroom.  Once inside the bathroom, I had one hand on pt and I moved the walker that was in front of the toilet with the other and while moving the walker pt sat down on the floor.  Pt states she was ok and didn't hurt her head or any other part of her body.  Pt helped standing up and put on the toilet, when she suddenly lost control of her torso and had to be completely held up.  Pt aware of what was going on around her.  Pt escorted back to bed with one assistance with a steady gait.  Neuro checks done, neuro intact. Pt with strong upper body strength (5/5). Md aware of the above.

## 2018-04-25 NOTE — PROGRESS NOTES
"[facility] Hospitalist Progress Note    Date of Service: 2018    Chief Complaint  53 y.o. female admitted 4/10/2018 with AMS presumably due to opiate OD, as she responded to narcan. Found to have CVA in addition to campylobacter infection in her stool. She has an abnormal EEG.    Interval Problem Update  : remains stable, laying in bed, rn reporting \"shaking when trying to stand up, or walk\", unclear if patient had any recent partial seizures. She has some deficits in cognition, but awake, responding to questions. Low bp's this am, will check lactic, blood cxs. Patient has midodrine for low bp's.     : patient had a fall earlier, lost her balance while standing in the bathroom. Remains a risk for falls due to poor muscle tone, conditioning, and ongoing mild orthostatic symptoms. She is underweight and nutritionally not at optimal level.     Consultants/Specialty  neurology    Disposition  Anticipating snf        Review of Systems   Constitutional: Negative for chills and fever.   HENT: Negative for hearing loss and tinnitus.    Eyes: Negative for blurred vision.   Respiratory: Negative for cough.    Cardiovascular: Negative for chest pain and palpitations.   Gastrointestinal: Negative for heartburn and nausea.   Genitourinary: Negative for dysuria.   Musculoskeletal: Negative for myalgias and neck pain.   Skin: Negative for rash.   Neurological: Negative for dizziness.   Psychiatric/Behavioral: Negative for depression and suicidal ideas.      Physical Exam  Laboratory/Imaging   Hemodynamics  Temp (24hrs), Av.3 °C (97.3 °F), Min:36.1 °C (97 °F), Max:36.7 °C (98 °F)   Temperature: 36.7 °C (98 °F)  Pulse  Av.2  Min: 12  Max: 111    Blood Pressure: 102/62      Respiratory      Respiration: 17, Pulse Oximetry: 95 %        RUL Breath Sounds: Clear, RML Breath Sounds: Clear, RLL Breath Sounds: Diminished, JOLYNN Breath Sounds: Clear, LLL Breath Sounds: Diminished    Fluids    Intake/Output Summary (Last  " hours) at 04/25/18 1600  Last data filed at 04/25/18 0520   Gross per 24 hour   Intake              340 ml   Output                0 ml   Net              340 ml       Nutrition  Orders Placed This Encounter   Procedures   • DIET ORDER     Standing Status:   Standing     Number of Occurrences:   1     Order Specific Question:   Diet:     Answer:   Regular [1]     Order Specific Question:   Texture/Fiber modifications:     Answer:   Dysphagia 3(Mechanical Soft)specify fluid consistency(question 6) [3]     Order Specific Question:   Consistency/Fluid modifications:     Answer:   Thin Liquids [3]     Physical Exam   Constitutional: She is oriented to person, place, and time. No distress.   White female nad, aaox3, underweight   HENT:   Head: Normocephalic and atraumatic.   Eyes: Left eye exhibits no discharge.   Neck: Neck supple.   Cardiovascular: Normal rate, regular rhythm, normal heart sounds and intact distal pulses.    No murmur heard.  Pulmonary/Chest: Effort normal and breath sounds normal. No respiratory distress.   Abdominal: Soft. Bowel sounds are normal. She exhibits no distension.   Musculoskeletal: Normal range of motion. She exhibits no edema.   Neurological: She is alert and oriented to person, place, and time.   Skin: Skin is warm and dry.   Psychiatric: Her behavior is normal.       Recent Labs      04/23/18   0903   WBC  14.2*   RBC  4.81   HEMOGLOBIN  14.7   HEMATOCRIT  44.4   MCV  92.3   MCH  30.6   MCHC  33.1*   RDW  50.4*   PLATELETCT  307   MPV  10.8     Recent Labs      04/23/18   0903   SODIUM  136   POTASSIUM  4.8   CHLORIDE  103   CO2  26   GLUCOSE  128*   BUN  28*   CREATININE  1.15   CALCIUM  9.3                      Assessment/Plan     * Stroke syndrome (CMS-HCC)   Assessment & Plan    Multifocal infarctions, largest in the left frontal lobe, left posterior occipital lobe, right parietal-occipital cortex, and right hemicerebellum, respectively  Neuro following.  Echo unremarkable. EF 70%.  No bubble study. Discussed with Neurology.    Carotid US normal.  LACI w bubble study showed PFO.  Neurology was discussed with the patient the option of surgical repair versus being on medical treatment with aspirin.  Neurologist recommended outpatient follow-up with cardiology if patient decided to proceed with surgical intervention. Otherwise, the management will be the same as patient receiving here.  Pending rehab acceptance.              Drug overdose - unintentional- (present on admission)   Assessment & Plan    Buprenorphine patch removed PTA.   Patient was on heavy doses of narcotics at home for chronic pain vs fibromyalgia  Denies suicidal ideation or history  Wean down narcotic as tolerated.             Hypotension- (present on admission)   Assessment & Plan    Continue Cortef and Midodrine.   Check lactic acid, cxs        Normocytic anemia- (present on admission)   Assessment & Plan    Monitor H&H        Seizure disorder as sequela of cerebrovascular accident (CMS-HCC)- (present on admission)   Assessment & Plan    On Keppra 1 g every 12 Iv.  On seizure precautions.  Neurology on board.        Chronic pain syndrome- (present on admission)   Assessment & Plan    Currently denies pain. Only required norco once overnight.  Wean Norco          Hypokalemia   Assessment & Plan    On replacement.        Campylobacter diarrhea- (present on admission)   Assessment & Plan    Campylobacter on cultures from transferring facility. C-diff negative here on 4/10/18.  Still having frequent bouts of watery diarrhea.   Patient has history of chronic diarrhea for years now.  Possible contribution from opioid withdrawal.          Hyperglycemia   Assessment & Plan    A1C normal at 5.4. No hx of Dm.  This likely steroid induced.   Monitor blood glucoses.            Leukocytosis- (present on admission)   Assessment & Plan    WBC trending down.    Completed Unasyn and Azithro complete 4/16.  Procalcitonin was normal.  Clinically  with no signs with active infection.   On Cortef which might contribute to leucocytosis.           Elevated brain natriuretic peptide (BNP) level- (present on admission)   Assessment & Plan    BNP 8000s on arrival.   TTE showed normal diastolic function & EF 70%.  No evidence of FVO on exam.            Elevated troponin- (present on admission)   Assessment & Plan    No CP or hx of CAD.  Normal echocardiogram.  Repeat trended down.         Autonomic neuropathy- (present on admission)   Assessment & Plan    Now normotensive. BP stable. Allow for permissive hypertension in light of multiple strokes.  On low dose Cortef  Midodrine was continued as the patient is getting occasional dizziness and lightheadedness.        Anorexia- (present on admission)   Assessment & Plan    Patient is depressed and anorexic. We'll add Remeron.  - requested dietitian consult        Aspiration pneumonia (CMS-HCC)- (present on admission)   Assessment & Plan    Completed a course of Unasyn.  Repeat cxr in appox 4-6 weeks to assess for resolution.   CT chest normal  F/u cxr 2/23: normal          Quality-Core Measures   Reviewed items::  Labs reviewed, Radiology images reviewed and Medications reviewed  Bishop catheter::  No Bishop  DVT prophylaxis pharmacological::  Heparin

## 2018-04-25 NOTE — CARE PLAN
Problem: Fluid Volume:  Goal: Will maintain balanced intake and output    Intervention: Monitor, educate, and encourage compliance with therapeutic intake of liquids  Fresh ice water to bedside. Patient encouraged to drink.      Problem: Respiratory:  Goal: Respiratory status will improve    Intervention: Assess and monitor pulmonary status  Lung sounds clear.

## 2018-04-25 NOTE — THERAPY
"Speech Language Therapy dysphagia treatment completed.     Functional Status:  Pt was seen for dysphagia therapy on this date. She was AAOx4 and was agreeable to PO trials. Pt continues to exhibit word finding difficulty in conversation and frequently utilizes circumlocutions to state the target word. RN reported that she has been tolerating her D3/Thins meals, as well as medication administration whole w/ liquid wash. Upon entering, pt exhibited min wet vocal quality, which required cue from SLP to clear. Pt consumed PO trials of single and sequential sips of thins via side of cup and straw, purees, and soft solids with no overt s/sx of aspiration. Upon palpation, pt's initiation of swallow was timely. Pt exhibited second swallow on 50% of trials of soft solids and solids to clear bolus.     Recommendations: Recommend continuation of a Dysphagia III w/ NTL diet. SLP following for dysphagia therapy and PO trials of solids as pt is agreeable. Please hold PO with any concern for aspiration.    Plan of Care: Will benefit from Speech Therapy 3 times per week  Post-Acute Therapy: Discharge to a transitional care facility for continued skilled therapy services.    See \"Rehab Therapy-Acute\" Patient Summary Report for complete documentation.     "

## 2018-04-25 NOTE — PROGRESS NOTES
"[facility] Hospitalist Progress Note    Date of Service: 2018    Chief Complaint  53 y.o. female admitted 4/10/2018 with AMS presumably due to opiate OD, as she responded to narcan. Found to have CVA in addition to campylobacter infection in her stool. She has an abnormal EEG.    Interval Problem Update  : remains stable, laying in bed, rn reporting \"shaking when trying to stand up, or walk\", unclear if patient had any recent partial seizures. She has some deficits in cognition, but awake, responding to questions. Low bp's this am, will check lactic, blood cxs. Patient has midodrine for low bp's.     Consultants/Specialty  Neurology    Disposition  Anticipating snf        Review of Systems   Constitutional: Negative for chills and fever.   HENT: Negative for hearing loss.    Eyes: Negative for blurred vision.   Cardiovascular: Negative for chest pain and palpitations.   Gastrointestinal: Negative for heartburn.   Genitourinary: Negative for dysuria.   Musculoskeletal: Negative for myalgias and neck pain.   Neurological: Negative for dizziness, focal weakness and headaches.   Endo/Heme/Allergies: Negative for environmental allergies. Does not bruise/bleed easily.   Psychiatric/Behavioral: Negative for depression and suicidal ideas.      Physical Exam  Laboratory/Imaging   Hemodynamics  Temp (24hrs), Av.2 °C (99 °F), Min:36.8 °C (98.2 °F), Max:37.6 °C (99.7 °F)   Temperature: 37.6 °C (99.7 °F)  Pulse  Av.1  Min: 12  Max: 111    Blood Pressure: 128/86      Respiratory      Respiration: 16, Pulse Oximetry: 95 %        RUL Breath Sounds: Clear, RML Breath Sounds: Clear, RLL Breath Sounds: Diminished, JOLYNN Breath Sounds: Clear, LLL Breath Sounds: Diminished    Fluids    Intake/Output Summary (Last 24 hours) at 18 1745  Last data filed at 18 1700   Gross per 24 hour   Intake             1750 ml   Output                0 ml   Net             1750 ml       Nutrition  Orders Placed This Encounter "   Procedures   • DIET ORDER     Standing Status:   Standing     Number of Occurrences:   1     Order Specific Question:   Diet:     Answer:   Regular [1]     Order Specific Question:   Texture/Fiber modifications:     Answer:   Dysphagia 3(Mechanical Soft)specify fluid consistency(question 6) [3]     Order Specific Question:   Consistency/Fluid modifications:     Answer:   Thin Liquids [3]     Physical Exam   Constitutional: No distress.   White female, laying in bed   HENT:   Head: Atraumatic.   Eyes: Pupils are equal, round, and reactive to light.   Neck: Neck supple.   Cardiovascular: Normal rate, normal heart sounds and intact distal pulses.    Pulmonary/Chest: Effort normal and breath sounds normal. No respiratory distress.   Abdominal: Soft. Bowel sounds are normal. She exhibits no distension.   Musculoskeletal: Normal range of motion. She exhibits no edema.   Neurological: She is alert.   Skin: Skin is warm and dry.   Psychiatric: She has a normal mood and affect.       Recent Labs      04/23/18   0903   WBC  14.2*   RBC  4.81   HEMOGLOBIN  14.7   HEMATOCRIT  44.4   MCV  92.3   MCH  30.6   MCHC  33.1*   RDW  50.4*   PLATELETCT  307   MPV  10.8     Recent Labs      04/23/18   0903   SODIUM  136   POTASSIUM  4.8   CHLORIDE  103   CO2  26   GLUCOSE  128*   BUN  28*   CREATININE  1.15   CALCIUM  9.3                      Assessment/Plan     * Stroke syndrome (CMS-HCC)   Assessment & Plan    Multifocal infarctions, largest in the left frontal lobe, left posterior occipital lobe, right parietal-occipital cortex, and right hemicerebellum, respectively  Neuro following.  Echo unremarkable. EF 70%. No bubble study. Discussed with Neurology.    Carotid US normal.  LACI w bubble study showed PFO.  Neurology was discussed with the patient the option of surgical repair versus being on medical treatment with aspirin.  Neurologist recommended outpatient follow-up with cardiology if patient decided to proceed with surgical  intervention. Otherwise, the management will be the same as patient receiving here.  Pending rehab acceptance.              Drug overdose - unintentional- (present on admission)   Assessment & Plan    Buprenorphine patch removed PTA.   Patient was on heavy doses of narcotics at home for chronic pain vs fibromyalgia  Denies suicidal ideation or history  Wean down narcotic as tolerated.             Anorexia- (present on admission)   Assessment & Plan    Patient is depressed and anorexic. We'll add Remeron.        Hypotension- (present on admission)   Assessment & Plan    Continue Cortef and Midodrine.   Check lactic acid, cxs        Normocytic anemia- (present on admission)   Assessment & Plan    Monitor H&H        Seizure disorder as sequela of cerebrovascular accident (CMS-[facility])- (present on admission)   Assessment & Plan    On Keppra 1 g every 12 Iv.  On seizure precautions.  Neurology on board.        Chronic pain syndrome- (present on admission)   Assessment & Plan    Currently denies pain. Only required norco once overnight.  Wean Norco          Hypokalemia   Assessment & Plan    On replacement.        Campylobacter diarrhea- (present on admission)   Assessment & Plan    Campylobacter on cultures from transferring facility. C-diff negative here on 4/10/18.  Still having frequent bouts of watery diarrhea.   Patient has history of chronic diarrhea for years now.  Possible contribution from opioid withdrawal.          Hyperglycemia- (present on admission)   Assessment & Plan    A1C normal at 5.4. No hx of Dm.  This likely steroid induced.   Monitor blood glucoses.            Leukocytosis- (present on admission)   Assessment & Plan    WBC trending down.    Completed Unasyn and Azithro complete 4/16.  Procalcitonin was normal.  Clinically with no signs with active infection.   On Cortef which might contribute to leucocytosis.           Elevated brain natriuretic peptide (BNP) level- (present on admission)   Assessment  & Plan    BNP 8000s on arrival.   TTE showed normal diastolic function & EF 70%.  No evidence of FVO on exam.            Elevated troponin- (present on admission)   Assessment & Plan    No CP or hx of CAD.  Normal echocardiogram.  Repeat trended down.         Autonomic neuropathy- (present on admission)   Assessment & Plan    Now normotensive. BP stable. Allow for permissive hypertension in light of multiple strokes.  On low dose Cortef  Midodrine was continued as the patient is getting occasional dizziness and lightheadedness.        Aspiration pneumonia (CMS-HCC)- (present on admission)   Assessment & Plan    Completed a course of Unasyn.  Repeat cxr in appox 4-6 weeks to assess for resolution.   CT chest normal  F/u cxr 2/23: normal          Quality-Core Measures   Reviewed items::  Labs reviewed, Radiology images reviewed and Medications reviewed  Bishop catheter::  No Bishop  DVT prophylaxis pharmacological::  Heparin  Ulcer Prophylaxis::  No

## 2018-04-25 NOTE — DISCHARGE PLANNING
Per GORDO Monte, CCS to send updated referrals to [facility].     SW to escalate [facility] denials to management.      Supervisor, Mary notified via email.

## 2018-04-25 NOTE — EEG PROGRESS NOTE
EEG 04/24/18 9:24 PM    ROUTINE ELECTROENCEPHALOGRAM REPORT      NAME: Shasta Dunn    REFERRING Dr:    INDICATION: Seizure.      TECHNIQUE: 30 channel routine electroencephalogram (EEG) was performed in accordance with the international 10-20 system. The study was reviewed in bipolar and referential montages. The recording examined the patient during wakeful and drowsy state(s).     DESCRIPTION OF THE RECORD:      Background rhythm during awake stage shows well-organized, well-developed, average voltage 9 to 10 hertz alpha activity in the posterior regions.  It blocks with eye opening and it is bilaterally synchronous and symmetrical.  No spike-and-wave discharges but there are high amplidue lateralizing delta abnormalities over the left are seen.  Photic stimulation did not produce any abnormalities.Stage I sleep was achieved.        ACTIVATION PROCEDURES:      Photic Stimulation were done    ICTAL AND/OR INTERICTAL FINDINGS:    No focal or generalized epileptiform activity noted. No regional slowing was seen during this routine study.  No clinical events or seizures were reported or recorded during the study.      EKG: sampling of the EKG recording demonstrated sinus rhythm.        INTERPRETATION:      ________________________________________________________________________    This scalp EEG is consistent with        1. Focal cortical dysfunction and irritability over Left frontal -- interictal pattern of focal seizure remains probable - advise clinical correlation regarding management       It is reasonable to continue seizure medication for now and follow up in outpatient neurology clinic    There are no active seizures in this record though    EEG 04/24/18 9:28 PM    ________________________________________________________________________

## 2018-04-25 NOTE — PROCEDURES
DATE OF SERVICE:  04/24/2018    This inpatient EEG was done on 04/24/2018.    EEG 04/24/18 9:24 PM     ROUTINE ELECTROENCEPHALOGRAM REPORT        NAME: ShaunShasta     REFERRING Dr:     INDICATION: Seizure.        TECHNIQUE: 30 channel routine electroencephalogram (EEG) was performed in accordance with the international 10-20 system. The study was reviewed in bipolar and referential montages. The recording examined the patient during wakeful and drowsy state(s).      DESCRIPTION OF THE RECORD:        Background rhythm during awake stage shows well-organized, well-developed, average voltage 9 to 10 hertz alpha activity in the posterior regions.  It blocks with eye opening and it is bilaterally synchronous and symmetrical.  No spike-and-wave discharges but there are high amplidue lateralizing delta abnormalities over the left are seen.  Photic stimulation did not produce any abnormalities.Stage I sleep was achieved.           ACTIVATION PROCEDURES:       Photic Stimulation were done     ICTAL AND/OR INTERICTAL FINDINGS:    No focal or generalized epileptiform activity noted. No regional slowing was seen during this routine study.  No clinical events or seizures were reported or recorded during the study.       EKG: sampling of the EKG recording demonstrated sinus rhythm.          INTERPRETATION:        ________________________________________________________________________     This scalp EEG is consistent with                     1. Focal cortical dysfunction and irritability over Left frontal -- interictal pattern of focal seizure remains probable - advise clinical correlation regarding management         It is reasonable to continue seizure medication for now and follow up in outpatient neurology clinic     There are no active seizures in this record though     EEG 04/24/18 9:28 PM     ________________________________________________________________________                              ____________________________________     MD JUDD CASTRO    DD:  04/24/2018 21:31:29  DT:  04/24/2018 21:40:34    D#:  [id number]  Job#:  [id number]

## 2018-04-26 ENCOUNTER — APPOINTMENT (OUTPATIENT)
Dept: RADIOLOGY | Facility: MEDICAL CENTER | Age: 53
DRG: 917 | End: 2018-04-26
Attending: EMERGENCY MEDICINE
Payer: COMMERCIAL

## 2018-04-26 ENCOUNTER — APPOINTMENT (OUTPATIENT)
Dept: RADIOLOGY | Facility: MEDICAL CENTER | Age: 53
DRG: 917 | End: 2018-04-26
Attending: INTERNAL MEDICINE
Payer: COMMERCIAL

## 2018-04-26 PROBLEM — J96.01 ACUTE RESPIRATORY FAILURE WITH HYPOXIA (HCC): Status: ACTIVE | Noted: 2018-04-26

## 2018-04-26 LAB
ACTION RANGE TRIGGERED IACRT: NO
ALBUMIN SERPL BCP-MCNC: 3.9 G/DL (ref 3.2–4.9)
ALBUMIN/GLOB SERPL: 1.3 G/DL
ALP SERPL-CCNC: 60 U/L (ref 30–99)
ALT SERPL-CCNC: 43 U/L (ref 2–50)
ANION GAP SERPL CALC-SCNC: 13 MMOL/L (ref 0–11.9)
ANISOCYTOSIS BLD QL SMEAR: ABNORMAL
AST SERPL-CCNC: 22 U/L (ref 12–45)
BASE EXCESS BLDA CALC-SCNC: -1 MMOL/L (ref -4–3)
BASE EXCESS BLDA CALC-SCNC: 0 MMOL/L (ref -4–3)
BASOPHILS # BLD AUTO: 0 % (ref 0–1.8)
BASOPHILS # BLD: 0 K/UL (ref 0–0.12)
BILIRUB SERPL-MCNC: 0.5 MG/DL (ref 0.1–1.5)
BODY TEMPERATURE: ABNORMAL CENTIGRADE
BODY TEMPERATURE: ABNORMAL DEGREES
BUN SERPL-MCNC: 31 MG/DL (ref 8–22)
CALCIUM SERPL-MCNC: 9.5 MG/DL (ref 8.5–10.5)
CHLORIDE SERPL-SCNC: 103 MMOL/L (ref 96–112)
CO2 BLDA-SCNC: 25 MMOL/L (ref 20–33)
CO2 SERPL-SCNC: 19 MMOL/L (ref 20–33)
CREAT SERPL-MCNC: 1.13 MG/DL (ref 0.5–1.4)
EKG IMPRESSION: NORMAL
EOSINOPHIL # BLD AUTO: 0 K/UL (ref 0–0.51)
EOSINOPHIL NFR BLD: 0 % (ref 0–6.9)
ERYTHROCYTE [DISTWIDTH] IN BLOOD BY AUTOMATED COUNT: 49 FL (ref 35.9–50)
GLOBULIN SER CALC-MCNC: 2.9 G/DL (ref 1.9–3.5)
GLUCOSE BLD-MCNC: 125 MG/DL (ref 65–99)
GLUCOSE SERPL-MCNC: 168 MG/DL (ref 65–99)
HCO3 BLDA-SCNC: 20 MMOL/L (ref 17–25)
HCO3 BLDA-SCNC: 23.6 MMOL/L (ref 17–25)
HCT VFR BLD AUTO: 44.9 % (ref 37–47)
HGB BLD-MCNC: 15 G/DL (ref 12–16)
INST. QUALIFIED PATIENT IIQPT: YES
LACTATE BLD-SCNC: 4.7 MMOL/L (ref 0.5–2)
LYMPHOCYTES # BLD AUTO: 4.39 K/UL (ref 1–4.8)
LYMPHOCYTES NFR BLD: 26 % (ref 22–41)
MACROCYTES BLD QL SMEAR: ABNORMAL
MAGNESIUM SERPL-MCNC: 2.2 MG/DL (ref 1.5–2.5)
MANUAL DIFF BLD: NORMAL
MCH RBC QN AUTO: 30.4 PG (ref 27–33)
MCHC RBC AUTO-ENTMCNC: 33.4 G/DL (ref 33.6–35)
MCV RBC AUTO: 91.1 FL (ref 81.4–97.8)
METAMYELOCYTES NFR BLD MANUAL: 1 %
MONOCYTES # BLD AUTO: 1.01 K/UL (ref 0–0.85)
MONOCYTES NFR BLD AUTO: 6 % (ref 0–13.4)
MORPHOLOGY BLD-IMP: NORMAL
NEUTROPHILS # BLD AUTO: 11.32 K/UL (ref 2–7.15)
NEUTROPHILS NFR BLD: 66 % (ref 44–72)
NEUTS BAND NFR BLD MANUAL: 1 % (ref 0–10)
NRBC # BLD AUTO: 0 K/UL
NRBC BLD-RTO: 0 /100 WBC
O2/TOTAL GAS SETTING VFR VENT: 70 %
PCO2 BLDA: 24.2 MMHG (ref 26–37)
PCO2 BLDA: 36.9 MMHG (ref 26–37)
PCO2 TEMP ADJ BLDA: 36.6 MMHG (ref 26–37)
PH BLDA: 7.41 [PH] (ref 7.4–7.5)
PH BLDA: 7.54 [PH] (ref 7.4–7.5)
PH TEMP ADJ BLDA: 7.42 [PH] (ref 7.4–7.5)
PLATELET # BLD AUTO: 329 K/UL (ref 164–446)
PLATELET BLD QL SMEAR: NORMAL
PMV BLD AUTO: 10.6 FL (ref 9–12.9)
PO2 BLDA: 133.1 MMHG (ref 64–87)
PO2 BLDA: 242 MMHG (ref 64–87)
PO2 TEMP ADJ BLDA: 242 MMHG (ref 64–87)
POTASSIUM SERPL-SCNC: 4 MMOL/L (ref 3.6–5.5)
PROT SERPL-MCNC: 6.8 G/DL (ref 6–8.2)
RBC # BLD AUTO: 4.93 M/UL (ref 4.2–5.4)
RBC BLD AUTO: PRESENT
SAO2 % BLDA: 100 % (ref 93–99)
SAO2 % BLDA: 99 % (ref 93–99)
SODIUM SERPL-SCNC: 135 MMOL/L (ref 135–145)
SPECIMEN DRAWN FROM PATIENT: ABNORMAL
WBC # BLD AUTO: 16.9 K/UL (ref 4.8–10.8)

## 2018-04-26 PROCEDURE — 700111 HCHG RX REV CODE 636 W/ 250 OVERRIDE (IP): Performed by: PSYCHIATRY & NEUROLOGY

## 2018-04-26 PROCEDURE — 83735 ASSAY OF MAGNESIUM: CPT

## 2018-04-26 PROCEDURE — 93010 ELECTROCARDIOGRAM REPORT: CPT | Performed by: INTERNAL MEDICINE

## 2018-04-26 PROCEDURE — 700105 HCHG RX REV CODE 258: Performed by: INTERNAL MEDICINE

## 2018-04-26 PROCEDURE — 99233 SBSQ HOSP IP/OBS HIGH 50: CPT | Performed by: HOSPITALIST

## 2018-04-26 PROCEDURE — 700101 HCHG RX REV CODE 250: Performed by: INTERNAL MEDICINE

## 2018-04-26 PROCEDURE — 71045 X-RAY EXAM CHEST 1 VIEW: CPT

## 2018-04-26 PROCEDURE — 80053 COMPREHEN METABOLIC PANEL: CPT

## 2018-04-26 PROCEDURE — 700111 HCHG RX REV CODE 636 W/ 250 OVERRIDE (IP): Performed by: INTERNAL MEDICINE

## 2018-04-26 PROCEDURE — 83605 ASSAY OF LACTIC ACID: CPT

## 2018-04-26 PROCEDURE — 36600 WITHDRAWAL OF ARTERIAL BLOOD: CPT

## 2018-04-26 PROCEDURE — 0BH18EZ INSERTION OF ENDOTRACHEAL AIRWAY INTO TRACHEA, VIA NATURAL OR ARTIFICIAL OPENING ENDOSCOPIC: ICD-10-PCS | Performed by: EMERGENCY MEDICINE

## 2018-04-26 PROCEDURE — 82962 GLUCOSE BLOOD TEST: CPT

## 2018-04-26 PROCEDURE — A9270 NON-COVERED ITEM OR SERVICE: HCPCS | Performed by: FAMILY MEDICINE

## 2018-04-26 PROCEDURE — 85007 BL SMEAR W/DIFF WBC COUNT: CPT

## 2018-04-26 PROCEDURE — 700105 HCHG RX REV CODE 258

## 2018-04-26 PROCEDURE — 93005 ELECTROCARDIOGRAM TRACING: CPT | Performed by: EMERGENCY MEDICINE

## 2018-04-26 PROCEDURE — 700111 HCHG RX REV CODE 636 W/ 250 OVERRIDE (IP)

## 2018-04-26 PROCEDURE — 94002 VENT MGMT INPAT INIT DAY: CPT

## 2018-04-26 PROCEDURE — 700102 HCHG RX REV CODE 250 W/ 637 OVERRIDE(OP): Performed by: INTERNAL MEDICINE

## 2018-04-26 PROCEDURE — B548ZZA ULTRASONOGRAPHY OF SUPERIOR VENA CAVA, GUIDANCE: ICD-10-PCS | Performed by: INTERNAL MEDICINE

## 2018-04-26 PROCEDURE — 82803 BLOOD GASES ANY COMBINATION: CPT

## 2018-04-26 PROCEDURE — 95951 EEG: CPT

## 2018-04-26 PROCEDURE — A9270 NON-COVERED ITEM OR SERVICE: HCPCS | Performed by: INTERNAL MEDICINE

## 2018-04-26 PROCEDURE — 5A1945Z RESPIRATORY VENTILATION, 24-96 CONSECUTIVE HOURS: ICD-10-PCS | Performed by: EMERGENCY MEDICINE

## 2018-04-26 PROCEDURE — 85027 COMPLETE CBC AUTOMATED: CPT

## 2018-04-26 PROCEDURE — 70450 CT HEAD/BRAIN W/O DYE: CPT

## 2018-04-26 PROCEDURE — 92950 HEART/LUNG RESUSCITATION CPR: CPT

## 2018-04-26 PROCEDURE — 700105 HCHG RX REV CODE 258: Performed by: PSYCHIATRY & NEUROLOGY

## 2018-04-26 PROCEDURE — 770022 HCHG ROOM/CARE - ICU (200)

## 2018-04-26 PROCEDURE — 700102 HCHG RX REV CODE 250 W/ 637 OVERRIDE(OP): Performed by: FAMILY MEDICINE

## 2018-04-26 PROCEDURE — C1751 CATH, INF, PER/CENT/MIDLINE: HCPCS

## 2018-04-26 PROCEDURE — 02HV33Z INSERTION OF INFUSION DEVICE INTO SUPERIOR VENA CAVA, PERCUTANEOUS APPROACH: ICD-10-PCS | Performed by: INTERNAL MEDICINE

## 2018-04-26 PROCEDURE — 700111 HCHG RX REV CODE 636 W/ 250 OVERRIDE (IP): Performed by: EMERGENCY MEDICINE

## 2018-04-26 PROCEDURE — 97535 SELF CARE MNGMENT TRAINING: CPT

## 2018-04-26 PROCEDURE — 36556 INSERT NON-TUNNEL CV CATH: CPT

## 2018-04-26 RX ORDER — BISACODYL 10 MG
10 SUPPOSITORY, RECTAL RECTAL
Status: DISCONTINUED | OUTPATIENT
Start: 2018-04-26 | End: 2018-04-26

## 2018-04-26 RX ORDER — MIDAZOLAM HYDROCHLORIDE 1 MG/ML
2 INJECTION INTRAMUSCULAR; INTRAVENOUS
Status: DISCONTINUED | OUTPATIENT
Start: 2018-04-26 | End: 2018-04-28

## 2018-04-26 RX ORDER — POLYETHYLENE GLYCOL 3350 17 G/17G
1 POWDER, FOR SOLUTION ORAL
Status: DISCONTINUED | OUTPATIENT
Start: 2018-04-26 | End: 2018-05-18 | Stop reason: HOSPADM

## 2018-04-26 RX ORDER — SODIUM CHLORIDE 9 MG/ML
INJECTION, SOLUTION INTRAVENOUS CONTINUOUS
Status: DISPENSED | OUTPATIENT
Start: 2018-04-26 | End: 2018-04-26

## 2018-04-26 RX ORDER — HYDROCODONE BITARTRATE AND ACETAMINOPHEN 5; 325 MG/1; MG/1
1 TABLET ORAL 3 TIMES DAILY
Status: DISCONTINUED | OUTPATIENT
Start: 2018-04-26 | End: 2018-05-02

## 2018-04-26 RX ORDER — KETAMINE HYDROCHLORIDE 50 MG/ML
50 INJECTION, SOLUTION INTRAMUSCULAR; INTRAVENOUS ONCE
Status: DISCONTINUED | OUTPATIENT
Start: 2018-04-26 | End: 2018-04-26

## 2018-04-26 RX ORDER — MIDAZOLAM HYDROCHLORIDE 1 MG/ML
5 INJECTION INTRAMUSCULAR; INTRAVENOUS
Status: DISCONTINUED | OUTPATIENT
Start: 2018-04-26 | End: 2018-04-28

## 2018-04-26 RX ORDER — ASPIRIN 81 MG/1
81 TABLET, CHEWABLE ORAL DAILY
Status: DISCONTINUED | OUTPATIENT
Start: 2018-04-26 | End: 2018-05-18 | Stop reason: HOSPADM

## 2018-04-26 RX ORDER — LOPERAMIDE HYDROCHLORIDE 2 MG/1
2 CAPSULE ORAL 4 TIMES DAILY PRN
Status: DISCONTINUED | OUTPATIENT
Start: 2018-04-26 | End: 2018-05-10

## 2018-04-26 RX ORDER — MIDAZOLAM HYDROCHLORIDE 1 MG/ML
1 INJECTION INTRAMUSCULAR; INTRAVENOUS
Status: DISCONTINUED | OUTPATIENT
Start: 2018-04-26 | End: 2018-04-28

## 2018-04-26 RX ORDER — MIDAZOLAM HYDROCHLORIDE 1 MG/ML
INJECTION INTRAMUSCULAR; INTRAVENOUS
Status: COMPLETED | OUTPATIENT
Start: 2018-04-26 | End: 2018-04-26

## 2018-04-26 RX ORDER — SODIUM CHLORIDE 9 MG/ML
1000 INJECTION, SOLUTION INTRAVENOUS ONCE
Status: COMPLETED | OUTPATIENT
Start: 2018-04-26 | End: 2018-04-26

## 2018-04-26 RX ORDER — ACETAMINOPHEN 325 MG/1
650 TABLET ORAL EVERY 6 HOURS PRN
Status: DISCONTINUED | OUTPATIENT
Start: 2018-04-26 | End: 2018-05-18 | Stop reason: HOSPADM

## 2018-04-26 RX ORDER — POLYETHYLENE GLYCOL 3350 17 G/17G
1 POWDER, FOR SOLUTION ORAL
Status: DISCONTINUED | OUTPATIENT
Start: 2018-04-26 | End: 2018-04-26

## 2018-04-26 RX ORDER — MIDAZOLAM HYDROCHLORIDE 1 MG/ML
INJECTION INTRAMUSCULAR; INTRAVENOUS
Status: COMPLETED
Start: 2018-04-26 | End: 2018-04-26

## 2018-04-26 RX ORDER — POTASSIUM CHLORIDE 20 MEQ/1
20 TABLET, EXTENDED RELEASE ORAL 2 TIMES DAILY
Status: DISCONTINUED | OUTPATIENT
Start: 2018-04-26 | End: 2018-04-28

## 2018-04-26 RX ORDER — MIRTAZAPINE 15 MG/1
15 TABLET, FILM COATED ORAL
Status: DISCONTINUED | OUTPATIENT
Start: 2018-04-26 | End: 2018-04-27

## 2018-04-26 RX ORDER — CHLORHEXIDINE GLUCONATE ORAL RINSE 1.2 MG/ML
15 SOLUTION DENTAL 2 TIMES DAILY
Status: DISCONTINUED | OUTPATIENT
Start: 2018-04-26 | End: 2018-04-27

## 2018-04-26 RX ORDER — AMOXICILLIN 250 MG
2 CAPSULE ORAL 2 TIMES DAILY
Status: DISCONTINUED | OUTPATIENT
Start: 2018-04-26 | End: 2018-05-18 | Stop reason: HOSPADM

## 2018-04-26 RX ORDER — BISACODYL 10 MG
10 SUPPOSITORY, RECTAL RECTAL
Status: DISCONTINUED | OUTPATIENT
Start: 2018-04-26 | End: 2018-05-18 | Stop reason: HOSPADM

## 2018-04-26 RX ORDER — SODIUM CHLORIDE, SODIUM LACTATE, POTASSIUM CHLORIDE, CALCIUM CHLORIDE 600; 310; 30; 20 MG/100ML; MG/100ML; MG/100ML; MG/100ML
INJECTION, SOLUTION INTRAVENOUS CONTINUOUS
Status: DISCONTINUED | OUTPATIENT
Start: 2018-04-26 | End: 2018-04-28

## 2018-04-26 RX ORDER — SODIUM CHLORIDE, SODIUM LACTATE, POTASSIUM CHLORIDE, CALCIUM CHLORIDE 600; 310; 30; 20 MG/100ML; MG/100ML; MG/100ML; MG/100ML
INJECTION, SOLUTION INTRAVENOUS
Status: COMPLETED
Start: 2018-04-26 | End: 2018-04-26

## 2018-04-26 RX ORDER — PREGABALIN 75 MG/1
150 CAPSULE ORAL 2 TIMES DAILY
Status: DISCONTINUED | OUTPATIENT
Start: 2018-04-26 | End: 2018-05-18 | Stop reason: HOSPADM

## 2018-04-26 RX ORDER — MIDAZOLAM HYDROCHLORIDE 1 MG/ML
INJECTION INTRAMUSCULAR; INTRAVENOUS
Status: DISCONTINUED
Start: 2018-04-26 | End: 2018-04-26

## 2018-04-26 RX ORDER — IPRATROPIUM BROMIDE AND ALBUTEROL SULFATE 2.5; .5 MG/3ML; MG/3ML
3 SOLUTION RESPIRATORY (INHALATION)
Status: DISCONTINUED | OUTPATIENT
Start: 2018-04-26 | End: 2018-05-10

## 2018-04-26 RX ORDER — AMOXICILLIN 250 MG
2 CAPSULE ORAL 2 TIMES DAILY
Status: DISCONTINUED | OUTPATIENT
Start: 2018-04-26 | End: 2018-04-26

## 2018-04-26 RX ADMIN — HEPARIN SODIUM 5000 UNITS: 5000 INJECTION, SOLUTION INTRAVENOUS; SUBCUTANEOUS at 05:51

## 2018-04-26 RX ADMIN — FENTANYL CITRATE 50 MCG: 50 INJECTION INTRAMUSCULAR; INTRAVENOUS at 17:35

## 2018-04-26 RX ADMIN — MIDAZOLAM HYDROCHLORIDE 1 MG: 1 INJECTION, SOLUTION INTRAMUSCULAR; INTRAVENOUS at 20:51

## 2018-04-26 RX ADMIN — MIDAZOLAM 4 MG: 1 INJECTION INTRAMUSCULAR; INTRAVENOUS at 08:08

## 2018-04-26 RX ADMIN — MIDAZOLAM HYDROCHLORIDE 2 MG: 1 INJECTION, SOLUTION INTRAMUSCULAR; INTRAVENOUS at 08:12

## 2018-04-26 RX ADMIN — STANDARDIZED SENNA CONCENTRATE AND DOCUSATE SODIUM 2 TABLET: 8.6; 5 TABLET, FILM COATED ORAL at 20:09

## 2018-04-26 RX ADMIN — MICONAZOLE NITRATE: 20 CREAM TOPICAL at 10:42

## 2018-04-26 RX ADMIN — MIDODRINE HYDROCHLORIDE 10 MG: 5 TABLET ORAL at 07:44

## 2018-04-26 RX ADMIN — FENTANYL CITRATE 25 MCG: 50 INJECTION INTRAMUSCULAR; INTRAVENOUS at 09:40

## 2018-04-26 RX ADMIN — SODIUM CHLORIDE, POTASSIUM CHLORIDE, SODIUM LACTATE AND CALCIUM CHLORIDE: 600; 310; 30; 20 INJECTION, SOLUTION INTRAVENOUS at 10:48

## 2018-04-26 RX ADMIN — SODIUM CHLORIDE 1000 MG: 9 INJECTION, SOLUTION INTRAVENOUS at 10:42

## 2018-04-26 RX ADMIN — MIDAZOLAM HYDROCHLORIDE 0.5 MG: 1 INJECTION, SOLUTION INTRAMUSCULAR; INTRAVENOUS at 09:44

## 2018-04-26 RX ADMIN — SODIUM CHLORIDE, POTASSIUM CHLORIDE, SODIUM LACTATE AND CALCIUM CHLORIDE: 600; 310; 30; 20 INJECTION, SOLUTION INTRAVENOUS at 18:45

## 2018-04-26 RX ADMIN — POTASSIUM CHLORIDE 20 MEQ: 1500 TABLET, EXTENDED RELEASE ORAL at 20:12

## 2018-04-26 RX ADMIN — MIDAZOLAM HYDROCHLORIDE 2 MG: 1 INJECTION, SOLUTION INTRAMUSCULAR; INTRAVENOUS at 08:13

## 2018-04-26 RX ADMIN — FENTANYL CITRATE 25 MCG: 50 INJECTION INTRAMUSCULAR; INTRAVENOUS at 10:09

## 2018-04-26 RX ADMIN — HYDROCORTISONE SODIUM SUCCINATE 50 MG: 100 INJECTION, POWDER, FOR SOLUTION INTRAMUSCULAR; INTRAVENOUS at 18:04

## 2018-04-26 RX ADMIN — SODIUM CHLORIDE 1000 ML: 9 INJECTION, SOLUTION INTRAVENOUS at 09:30

## 2018-04-26 RX ADMIN — HYDROCORTISONE SODIUM SUCCINATE 50 MG: 100 INJECTION, POWDER, FOR SOLUTION INTRAMUSCULAR; INTRAVENOUS at 08:57

## 2018-04-26 RX ADMIN — DEXMEDETOMIDINE HYDROCHLORIDE 0.2 MCG/KG/HR: 100 INJECTION, SOLUTION INTRAVENOUS at 10:38

## 2018-04-26 RX ADMIN — FENTANYL CITRATE 25 MCG: 50 INJECTION, SOLUTION INTRAMUSCULAR; INTRAVENOUS at 18:33

## 2018-04-26 RX ADMIN — MIRTAZAPINE 15 MG: 15 TABLET, FILM COATED ORAL at 20:09

## 2018-04-26 RX ADMIN — SODIUM CHLORIDE: 9 INJECTION, SOLUTION INTRAVENOUS at 09:50

## 2018-04-26 RX ADMIN — ASPIRIN 81 MG: 81 TABLET, CHEWABLE ORAL at 20:10

## 2018-04-26 RX ADMIN — PREGABALIN 150 MG: 75 CAPSULE ORAL at 20:08

## 2018-04-26 RX ADMIN — SODIUM CHLORIDE 1000 MG: 9 INJECTION, SOLUTION INTRAVENOUS at 20:08

## 2018-04-26 RX ADMIN — CHLORHEXIDINE GLUCONATE 15 ML: 1.2 RINSE ORAL at 20:08

## 2018-04-26 RX ADMIN — SODIUM CHLORIDE 2000 MG: 9 INJECTION, SOLUTION INTRAVENOUS at 14:26

## 2018-04-26 RX ADMIN — FENTANYL CITRATE 50 MCG: 50 INJECTION INTRAMUSCULAR; INTRAVENOUS at 15:50

## 2018-04-26 RX ADMIN — DEXMEDETOMIDINE HYDROCHLORIDE 0.6 MCG/KG/HR: 100 INJECTION, SOLUTION INTRAVENOUS at 23:08

## 2018-04-26 RX ADMIN — CHLORHEXIDINE GLUCONATE 15 ML: 1.2 RINSE ORAL at 11:15

## 2018-04-26 ASSESSMENT — ENCOUNTER SYMPTOMS
LOSS OF CONSCIOUSNESS: 1
WEAKNESS: 1
FALLS: 1
HEMOPTYSIS: 0
MEMORY LOSS: 1
SEIZURES: 1

## 2018-04-26 ASSESSMENT — COGNITIVE AND FUNCTIONAL STATUS - GENERAL
HELP NEEDED FOR BATHING: A LITTLE
DAILY ACTIVITIY SCORE: 19
DRESSING REGULAR LOWER BODY CLOTHING: A LITTLE
DRESSING REGULAR UPPER BODY CLOTHING: A LITTLE
SUGGESTED CMS G CODE MODIFIER DAILY ACTIVITY: CK
PERSONAL GROOMING: A LITTLE
TOILETING: A LITTLE

## 2018-04-26 NOTE — PROGRESS NOTES
Updated Dr. Lyle on pt's b/p trending up. Currently sys 150's. Per MD keep sys less than 180. Also updated MD on pt HR. Pt on precedex HR 48-60. New order for propofol received from MD.

## 2018-04-26 NOTE — PROGRESS NOTES
Pt had to have a bowel movement, Pt was currently working with OT.  OT put pt on commode.  While on commode pt lost control of upper body while having bowel movement.  OT called RN to come see pt.  Pt found on commode, not responding, pt's lips were blue.  Blood pressure was 78/34, hr 46, oxygen was 71% on room air. Oxygen 2LNC placed on pt and oxygen went up to 90%.  Pt lifted back into bed.  Pt lost a pulse, initiated a code blue, put pt on back board and when turned over pt started breathing on her own with a pulse.  Pt seemed to have an obstructed airway with her jaw and tongue partially blocking airway, pt still sating 88-91% on 2LNC.  Code blue team arrived, see code documentation.  Pt then transferred to [facility]. MD notified family.

## 2018-04-26 NOTE — PROGRESS NOTES
"Vitals:    04/26/18 0814 04/26/18 0821 04/26/18 0826 04/26/18 0834   BP: (!) 91/55  (!) 84/50 113/48   Pulse: (!) 120 (!) 111 (!) 123 (!) 117   Resp: 12 12 12   Temp: 36.8 °C (98.2 °F)      SpO2: 100%  97% 100%   Weight:       Height:         Sedated/post ictal state, intubated  s1s2, tachy  Air entry adequate b/l  abd soft  Ext no edema  Distal pulses intact  Only minimally arousable, currently altered due to sedation from versed, and post ictal phase  No rashes, pale appearance    Code Blue was called at 8am,   Per rn \" at 8am ot was working with patient, she was sitting on a commode, patient lost upper body tone, had a bowel movement, lips turned blue, and they weren't able to get a pulse, code blue was called, immediately intubated by ER physician\". She got 4 versed, no cpr, or acls drugs administered\". Currently hr is 110's, sbp 90's, asked to administer fluids continuously, willl check stat labs, cxr, ct head, eeg. Transfer to ICU. Rapid/code blue team is bedside who are handling patient's bedside care. I attempted an IO in left lower extremity but unable to get an adequate placement with manual puncture needle. I spoke to family (daughter) smooth crabtree about events from this am.         "

## 2018-04-26 NOTE — THERAPY
Pt on hold from SLP on this date due to a change in medical status and transfer to [facility] post code.  Was on D3/thin liquid diet, recently upgraded from a D3/NTL.  Pt also being seen for sp/deya caba for high level word retrieval deficit.  Will need reorders when appropriate.  Floor SLPs notified of transfer to [facility]

## 2018-04-26 NOTE — ED PROVIDER NOTES
I was called to the bedside for code blue. Nurse provides history. Patient was up with physical therapy. She suddenly became unresponsive. She was apneic and pulseless. Code blue was called. She was laid down on the bed and had spontaneous return of pulse. She appeared to be breathing, but no gag reflex. She has an OP airway. She's been unresponsive to pain. She has not been given any sedatives. She has not been given pain medication or opiates. Blood sugar was in the 100s. Dr. Zarate, the patient's attending physician requested airway management which seems appropriate.    Intubation Procedure Note    Indication: Failure to protect airway    Consent: Unable to be obtained due to patient's condition.    Medications Used: None required    Procedure: The patient was placed in the appropriate position.  Cricoid pressure was utilized.  Intubation was performed by direct laryngoscopy using a laryngoscope and a 7.5 cuffed endotracheal tube.  The cuff was then inflated and the tube was secured appropriately.Initial confirmation of placement included bilateral breath sounds, an end tidal CO2 detector, absence of sounds over the stomach, tube fogging, adequate chest rise and adequate pulse oximetry reading.  A chest x-ray to verify correct placement of the tube was ordered and is pending    The patient tolerated the procedure well.     Complications: None     Following the procedure the patient is given 4 mg of Versed. Ordered additional Versed as needed for sedation. I query subclinical status seizure versus other CNS catastrophe. Care will be directed by the admitting team

## 2018-04-26 NOTE — PROCEDURES
[facility]    DOS:  4/26/2018    Procedure:  R IJ central line    Indications:  Emergently performed, no peripheral access, needs multiple IV meds/possible drips-pressors/CVP monitoring and emergent CT head    Diagnosis:  Acute hypoxemic respiratory failure, altered LOC, CVA vs seizure, dehydration    Time out observed    Procedure:  R neck prep/drape usual fashion - chlorhexidine/sterile fields   glove/gown/cap/mask  U/s guidance - excellent views  Vein cannulated 2st attempt - wire passed easily, wire seen in vein with U/s  Flat veins despite trendellenberg - suspect dehydration  7.0 F triple lumen place with Seldinger technique  Good venous return all ports  Central line sutured in place 3.0 silk x 2  Site re-prep with chlorhexidine/Biopatch/sterile dressing  CXR:  No PTX, line good position

## 2018-04-26 NOTE — PROGRESS NOTES
Pt having seizure like activity; very tense with minimal tremors. Eyes gaze to the left. Dr. Ocampo notified. MD at bedside to assess pt and speak with pt's daughter.

## 2018-04-26 NOTE — EEG PROGRESS NOTE
Non convulsive seizure    Focus probably from right frontal region  The left limb tonic posturing are part of seizure manifestations    Neurology team will manage the seizure medication and video EEG

## 2018-04-26 NOTE — PROGRESS NOTES
Rapid response team was called this morning  Became unresponsive since then  Stat CT-- no ICH, subacute stroke remains  Questionable seizures  Will get stat EEG    Now intubated  Left limbs tonic  Eyes open, but no eye contacts    Underlined chronic narcotic use

## 2018-04-26 NOTE — DIETARY
Nutrition Services: Consult metabolic cart study    Admit day 16.  RD has been following pt since admit, most recently for Calorie Count (initiated 4/25).  Code Blue this morning. Pt now intubated, NPO. Transfer Neuro to CICU.    RD will monitor for nutrition orders.

## 2018-04-26 NOTE — ASSESSMENT & PLAN NOTE
Likely secondary to seizure.  Required intubation on 4/26 and extubation on 4/27.  Pulmonology signed off  Resolved

## 2018-04-26 NOTE — CODE DOCUMENTATION
Pt up with OT to chair. Pt having a bowel movement and had a  Vagal response. Pt put back to bed. Code blue called. Sternal rubbed and had return of pulses and color return. Pt still not protecting airway and ERP at bedside to intubate.

## 2018-04-26 NOTE — PROGRESS NOTES
0837: Report received from SHAHNAZ Sandoval.     0854: pt arrived to -[facility] via bed with RRT. Pt currently intubated with bbm ventilation. Pt currently posturing with painful stimulus. Eyes currently nystagmus; pupils fixed at 2mm. Pt has positive corneal; absent cough and gag. Pt currently has no IV access. Ultrasound PIV placement attempted with no success. Pt -160's. B/P sys 80's-100's.     0900: Dr. Lyle and Dr. Ocampo at bedside to assess pt. Central line set up. Lactic acid of 4.7 reported to MD's      0906: timeout called for an emergent central line placement by Dr. Lyle.    0915: Dr. Churhcill with neurology stat consulted per Dr. Lyle. Dr. Churchill updated on pt admission and current status. Waiting line placement in order to go to stat CT scan.     0923: central line placed. Per MD give 1000 ml NS bolus stat. Stat chest xray ordered.

## 2018-04-26 NOTE — DISCHARGE PLANNING
Code Blue called this AM. ANZANIN called and notified daughterLing of change in status. Director of Nursing, Deborah provided brief update to pt's daughter; Pt has pulse/breathing. Daughter to notify her sister.     SW to call daughter back to provide update.     Update:    Dr. Lehman called and provided update to daughter.     NAZANIN called daughter and provided her with pt's new room number and contact number to [facility].

## 2018-04-26 NOTE — RESPIRATORY CARE
Cardiopulmonary Resuscitation/Intubation Assist    Intubation assist performed: Yes  Reason for intubation: Airway protection  Positive Color Change on EZCap?: Yes  EtCO2 mmH  Difficult Intubation/Number of attempts: No/1  Evidence of aspiration: No

## 2018-04-26 NOTE — THERAPY
"Occupational Therapy Treatment completed with focus on ADLs, ADL transfers and patient education.  Functional Status:  Pt seen for OT tx today.  Pt was pleasant and cooperative until rapid then code called.  Continues to be limited by endurance, transfers, initiation, and self care.  Pt wanted to get cleaned up after education on the importance of daily routine.  Once getting ready to start pt stated she needed to use the bathroom to which BSC was provided and pt was transferred to needing SBA.  Pt leaned forward stating she was dizzy and then after a minute color started to change and pt became unresponsive.  RN was then notified and rapid called then becoming a code blue.  Plan of Care: Will benefit from Occupational Therapy 3 times per week  Discharge Recommendations:  Equipment Will Continue to Assess for Equipment Needs.    See \"Rehab Therapy-Acute\" Patient Summary Report for complete documentation.   "

## 2018-04-26 NOTE — CARE PLAN
Problem: Nutritional:  Goal: Achieve adequate nutritional intake  Patient will tolerate po diet with at least 50% intake of meals.   Outcome: PROGRESSING AS EXPECTED

## 2018-04-26 NOTE — DIETARY
"Nutrition Support Assessment     Nutrition services:   Day 16 of admit.  Shasta Dunn is a 53 y.o. female with admitting DX of Overdose      Current problem list:  1. VDRF  2. ?Seizures; continuous EEG per Neuro  3. Ongoing inadequate nutrition since admit, day 16     Assessment:  Estimated Nutritional Needs: based on:   Height: 167.6 cm (5' 6\")  Weight: 52.2 kg (115 lb 1.3 oz)  Ideal Body Weight: 59 kg (130 lb)  Percent Ideal Body Weight: 88.5  Body mass index is 18.57 kg/m².    Calculation/Equation: REE per MSJ x1.2 = 1368 kcal/day; RMR per PSU (vent L/min 7.3, T max/24 hours 36.8) = 1253 kcal/day  Total Calories / day: 1305 - 1565 (Calories / k - 30)  Total Grams Protein / day: 63 - 78+ (Grams Protein / k.2 - 1.5+)     Evaluation:   1. Pt is intubated and unable to take PO diet. Orders received to start TF. Cortrak has not yet been placed.  2. RD following pt since admit secondary to inadequate nutrition.  3. Labs reviewed. Lactic acid 4.7.  4. Meds reviewed.   5. Weight down 4 kg since initial scale weight on 4/10, indicating severe 7% weight loss in 16 days.  6. Standard TF formula will meet needs.   7. Risk for refeeding syndrome due to ongoing poor PO intake.      Malnutrition Risk: Would consider pt with severe malnutrition in the context of acute illness as evidenced by </=50% of estimated energy requirements >/=5 days and >2% weight loss in 1 week. However, unable to qualify if inflammation present. Recommend obtain measured CRP.     Recommendations/Plan:  1. Start Fibersource HN @ 25 ml/hr and advance per protocol to goal rate 50 ml/hr to provide 1440 kcal, 65 grams protein, and 972 ml free water per day.  2. Fluids per MD.  3. Monitor for refeeding syndrome.  4. Obtain measured CRP to assess for inflammation and Dx malnutrition.     RD following.                 "

## 2018-04-26 NOTE — PROGRESS NOTES
Pt transported to CT scan via bed on telemetry monitoring and ventilator with RT and two RN transport. Pt VSS stable at this time.

## 2018-04-26 NOTE — PROGRESS NOTES
Dr. Lyle at bedside. Updated MD on pt's current condition. Pt now withdrawing to pain without posturing and is having purposeful movements. CVP 5-6, u/o 50ml since pt transferred to unit. Per neurology pt to be placed on cont EEG. No order for cortrak. Per MD place cortrak and start tube feeding per protocol.

## 2018-04-27 ENCOUNTER — APPOINTMENT (OUTPATIENT)
Dept: RADIOLOGY | Facility: MEDICAL CENTER | Age: 53
DRG: 917 | End: 2018-04-27
Attending: INTERNAL MEDICINE
Payer: COMMERCIAL

## 2018-04-27 ENCOUNTER — APPOINTMENT (OUTPATIENT)
Dept: RADIOLOGY | Facility: MEDICAL CENTER | Age: 53
DRG: 917 | End: 2018-04-27
Attending: HOSPITALIST
Payer: COMMERCIAL

## 2018-04-27 LAB
ANION GAP SERPL CALC-SCNC: 9 MMOL/L (ref 0–11.9)
BASOPHILS # BLD AUTO: 0.2 % (ref 0–1.8)
BASOPHILS # BLD: 0.02 K/UL (ref 0–0.12)
BUN SERPL-MCNC: 19 MG/DL (ref 8–22)
CALCIUM SERPL-MCNC: 9 MG/DL (ref 8.5–10.5)
CHLORIDE SERPL-SCNC: 108 MMOL/L (ref 96–112)
CO2 SERPL-SCNC: 22 MMOL/L (ref 20–33)
CREAT SERPL-MCNC: 0.66 MG/DL (ref 0.5–1.4)
EOSINOPHIL # BLD AUTO: 0.01 K/UL (ref 0–0.51)
EOSINOPHIL NFR BLD: 0.1 % (ref 0–6.9)
ERYTHROCYTE [DISTWIDTH] IN BLOOD BY AUTOMATED COUNT: 46.1 FL (ref 35.9–50)
GLUCOSE SERPL-MCNC: 155 MG/DL (ref 65–99)
HCT VFR BLD AUTO: 36.6 % (ref 37–47)
HGB BLD-MCNC: 12.7 G/DL (ref 12–16)
IMM GRANULOCYTES # BLD AUTO: 0.06 K/UL (ref 0–0.11)
IMM GRANULOCYTES NFR BLD AUTO: 0.5 % (ref 0–0.9)
LYMPHOCYTES # BLD AUTO: 1.5 K/UL (ref 1–4.8)
LYMPHOCYTES NFR BLD: 11.6 % (ref 22–41)
MAGNESIUM SERPL-MCNC: 1.8 MG/DL (ref 1.5–2.5)
MCH RBC QN AUTO: 30.8 PG (ref 27–33)
MCHC RBC AUTO-ENTMCNC: 34.7 G/DL (ref 33.6–35)
MCV RBC AUTO: 88.8 FL (ref 81.4–97.8)
MONOCYTES # BLD AUTO: 0.55 K/UL (ref 0–0.85)
MONOCYTES NFR BLD AUTO: 4.2 % (ref 0–13.4)
NEUTROPHILS # BLD AUTO: 10.82 K/UL (ref 2–7.15)
NEUTROPHILS NFR BLD: 83.4 % (ref 44–72)
NRBC # BLD AUTO: 0 K/UL
NRBC BLD-RTO: 0 /100 WBC
PHOSPHATE SERPL-MCNC: 2.3 MG/DL (ref 2.5–4.5)
PLATELET # BLD AUTO: 237 K/UL (ref 164–446)
PMV BLD AUTO: 11.3 FL (ref 9–12.9)
POTASSIUM SERPL-SCNC: 3.6 MMOL/L (ref 3.6–5.5)
RBC # BLD AUTO: 4.12 M/UL (ref 4.2–5.4)
SODIUM SERPL-SCNC: 139 MMOL/L (ref 135–145)
WBC # BLD AUTO: 13 K/UL (ref 4.8–10.8)

## 2018-04-27 PROCEDURE — 84100 ASSAY OF PHOSPHORUS: CPT

## 2018-04-27 PROCEDURE — 85025 COMPLETE CBC W/AUTO DIFF WBC: CPT

## 2018-04-27 PROCEDURE — 700102 HCHG RX REV CODE 250 W/ 637 OVERRIDE(OP): Performed by: INTERNAL MEDICINE

## 2018-04-27 PROCEDURE — 99233 SBSQ HOSP IP/OBS HIGH 50: CPT | Performed by: HOSPITALIST

## 2018-04-27 PROCEDURE — 700105 HCHG RX REV CODE 258: Performed by: INTERNAL MEDICINE

## 2018-04-27 PROCEDURE — 95951 HCHG EEG-VIDEO-24HR: CPT | Mod: 52

## 2018-04-27 PROCEDURE — 770022 HCHG ROOM/CARE - ICU (200)

## 2018-04-27 PROCEDURE — 94003 VENT MGMT INPAT SUBQ DAY: CPT

## 2018-04-27 PROCEDURE — 71045 X-RAY EXAM CHEST 1 VIEW: CPT

## 2018-04-27 PROCEDURE — A9270 NON-COVERED ITEM OR SERVICE: HCPCS | Performed by: INTERNAL MEDICINE

## 2018-04-27 PROCEDURE — 80048 BASIC METABOLIC PNL TOTAL CA: CPT

## 2018-04-27 PROCEDURE — 83735 ASSAY OF MAGNESIUM: CPT

## 2018-04-27 PROCEDURE — 700111 HCHG RX REV CODE 636 W/ 250 OVERRIDE (IP): Performed by: INTERNAL MEDICINE

## 2018-04-27 PROCEDURE — 94150 VITAL CAPACITY TEST: CPT

## 2018-04-27 PROCEDURE — 700111 HCHG RX REV CODE 636 W/ 250 OVERRIDE (IP): Performed by: HOSPITALIST

## 2018-04-27 PROCEDURE — 74018 RADEX ABDOMEN 1 VIEW: CPT

## 2018-04-27 PROCEDURE — 36600 WITHDRAWAL OF ARTERIAL BLOOD: CPT

## 2018-04-27 PROCEDURE — 92526 ORAL FUNCTION THERAPY: CPT

## 2018-04-27 PROCEDURE — 700102 HCHG RX REV CODE 250 W/ 637 OVERRIDE(OP): Performed by: HOSPITALIST

## 2018-04-27 PROCEDURE — 700101 HCHG RX REV CODE 250: Performed by: INTERNAL MEDICINE

## 2018-04-27 RX ORDER — MAGNESIUM SULFATE HEPTAHYDRATE 40 MG/ML
2 INJECTION, SOLUTION INTRAVENOUS ONCE
Status: COMPLETED | OUTPATIENT
Start: 2018-04-27 | End: 2018-04-27

## 2018-04-27 RX ORDER — SODIUM CHLORIDE 9 MG/ML
INJECTION, SOLUTION INTRAVENOUS
Status: ACTIVE
Start: 2018-04-27 | End: 2018-04-27

## 2018-04-27 RX ADMIN — PREGABALIN 150 MG: 75 CAPSULE ORAL at 07:44

## 2018-04-27 RX ADMIN — MICONAZOLE NITRATE: 20 CREAM TOPICAL at 07:44

## 2018-04-27 RX ADMIN — SODIUM CHLORIDE, POTASSIUM CHLORIDE, SODIUM LACTATE AND CALCIUM CHLORIDE: 600; 310; 30; 20 INJECTION, SOLUTION INTRAVENOUS at 22:03

## 2018-04-27 RX ADMIN — HYDROCODONE BITARTRATE AND ACETAMINOPHEN 1 TABLET: 5; 325 TABLET ORAL at 20:20

## 2018-04-27 RX ADMIN — POTASSIUM CHLORIDE 20 MEQ: 1500 TABLET, EXTENDED RELEASE ORAL at 07:45

## 2018-04-27 RX ADMIN — MIDAZOLAM HYDROCHLORIDE 1 MG: 1 INJECTION, SOLUTION INTRAMUSCULAR; INTRAVENOUS at 00:26

## 2018-04-27 RX ADMIN — MICONAZOLE NITRATE: 20 CREAM TOPICAL at 20:20

## 2018-04-27 RX ADMIN — HYDROCORTISONE SODIUM SUCCINATE 50 MG: 100 INJECTION, POWDER, FOR SOLUTION INTRAMUSCULAR; INTRAVENOUS at 00:27

## 2018-04-27 RX ADMIN — ASPIRIN 81 MG: 81 TABLET, CHEWABLE ORAL at 07:45

## 2018-04-27 RX ADMIN — HYDROCORTISONE SODIUM SUCCINATE 50 MG: 100 INJECTION, POWDER, FOR SOLUTION INTRAMUSCULAR; INTRAVENOUS at 05:44

## 2018-04-27 RX ADMIN — POTASSIUM CHLORIDE 20 MEQ: 1500 TABLET, EXTENDED RELEASE ORAL at 20:20

## 2018-04-27 RX ADMIN — HYDROCORTISONE SODIUM SUCCINATE 50 MG: 100 INJECTION, POWDER, FOR SOLUTION INTRAMUSCULAR; INTRAVENOUS at 18:05

## 2018-04-27 RX ADMIN — MIDAZOLAM HYDROCHLORIDE 1 MG: 1 INJECTION, SOLUTION INTRAMUSCULAR; INTRAVENOUS at 03:27

## 2018-04-27 RX ADMIN — MICONAZOLE NITRATE: 20 CREAM TOPICAL at 15:11

## 2018-04-27 RX ADMIN — ENOXAPARIN SODIUM 40 MG: 100 INJECTION SUBCUTANEOUS at 11:44

## 2018-04-27 RX ADMIN — SODIUM CHLORIDE 1000 MG: 9 INJECTION, SOLUTION INTRAVENOUS at 20:32

## 2018-04-27 RX ADMIN — HYDROCORTISONE SODIUM SUCCINATE 50 MG: 100 INJECTION, POWDER, FOR SOLUTION INTRAMUSCULAR; INTRAVENOUS at 11:43

## 2018-04-27 RX ADMIN — CHLORHEXIDINE GLUCONATE 15 ML: 1.2 RINSE ORAL at 07:45

## 2018-04-27 RX ADMIN — SODIUM CHLORIDE 1000 MG: 9 INJECTION, SOLUTION INTRAVENOUS at 09:19

## 2018-04-27 RX ADMIN — SODIUM CHLORIDE, POTASSIUM CHLORIDE, SODIUM LACTATE AND CALCIUM CHLORIDE: 600; 310; 30; 20 INJECTION, SOLUTION INTRAVENOUS at 13:11

## 2018-04-27 RX ADMIN — HYDROCODONE BITARTRATE AND ACETAMINOPHEN 1 TABLET: 5; 325 TABLET ORAL at 15:11

## 2018-04-27 RX ADMIN — POTASSIUM PHOSPHATE, MONOBASIC AND POTASSIUM PHOSPHATE, DIBASIC 15 MMOL: 224; 236 INJECTION, SOLUTION INTRAVENOUS at 09:19

## 2018-04-27 RX ADMIN — HYDROCORTISONE SODIUM SUCCINATE 50 MG: 100 INJECTION, POWDER, FOR SOLUTION INTRAMUSCULAR; INTRAVENOUS at 23:48

## 2018-04-27 RX ADMIN — MAGNESIUM SULFATE IN WATER 2 G: 40 INJECTION, SOLUTION INTRAVENOUS at 07:45

## 2018-04-27 RX ADMIN — PREGABALIN 150 MG: 75 CAPSULE ORAL at 20:20

## 2018-04-27 RX ADMIN — SODIUM CHLORIDE, POTASSIUM CHLORIDE, SODIUM LACTATE AND CALCIUM CHLORIDE 125 ML: 600; 310; 30; 20 INJECTION, SOLUTION INTRAVENOUS at 03:20

## 2018-04-27 RX ADMIN — ACETAMINOPHEN 650 MG: 325 TABLET, FILM COATED ORAL at 23:48

## 2018-04-27 ASSESSMENT — PAIN SCALES - GENERAL
PAINLEVEL_OUTOF10: 9
PAINLEVEL_OUTOF10: 6
PAINLEVEL_OUTOF10: 4
PAINLEVEL_OUTOF10: 5
PAINLEVEL_OUTOF10: 8
PAINLEVEL_OUTOF10: 0

## 2018-04-27 ASSESSMENT — PULMONARY FUNCTION TESTS: FVC: 1800

## 2018-04-27 ASSESSMENT — ENCOUNTER SYMPTOMS
FALLS: 1
SEIZURES: 1
MEMORY LOSS: 1
LOSS OF CONSCIOUSNESS: 1
WEAKNESS: 1
HEMOPTYSIS: 0

## 2018-04-27 ASSESSMENT — LIFESTYLE VARIABLES: EVER_SMOKED: NEVER

## 2018-04-27 NOTE — DISCHARGE PLANNING
Medical SW    Sw attended AM IDT Rounds.    RN reports, pt extubated this AM, following commands, nodding appropriately,     Plan: Sw to assist w/ d/c planning as needed.

## 2018-04-27 NOTE — DISCHARGE PLANNING
Spoke with  at [facility], they have a virus running through their facility at this time and will review the referral again on Monday.

## 2018-04-27 NOTE — CONSULTS
DATE OF SERVICE:  04/26/2018    I was asked to see the patient by Dr. Angel Ocampo.    REASON FOR CONSULTATION:  Status post code blue with acute respiratory failure   requiring intubation.    HISTORY OF PRESENT ILLNESS:  Patient is a 53-year-old woman with a past   history of opioid use, chronic pain syndrome who was admitted on 04/10 for   altered mental status, subsequently felt to have left-sided as well as   right-sided cerebellar CVA.  She was seen by neurology, felt to possibly have   focal seizures and started on Keppra.  Patient had diarrhea complicating   course, and Campylobacter was identified and she was treated with antibiotics.    She clinically improved and got out of the ICU, was in the neurological   floor having completion of neurologic evaluation.  A LACI revealed a patent   foramen ovale.  Medical treatment versus surgical closure was being evaluated   by neurology.  The patient today was up with some physical therapy, and she   suddenly became unresponsive, apneic and pulseless.  Code blue was called, and   she was placed back in bed.  She had spontaneous return of circulation and   pulse.  She was unresponsive with no gag and was not protecting her airway.    Emergency room physician who responded to the code intubated her for airway   protection.  IV access was not obtainable and she was rapidly transferred to   the ICU.  She was very tachycardic in the 160s, but appeared to be sinus and   blood pressures in the 60s-90s and quite low.  Very diaphoretic on arrival.    No history from the patient.  She did have some possible focal seizure-like   activity or posturing.  Stat CT head was ordered to rule out CNS bleed.    PAST MEDICAL HISTORY:  Significant for hypotension, chronic pain, prior   narcotic abuse, unspecified psychiatric disorder.    ALLERGIES:  SULFAMETHOXAZOLE AND TRIMETHOPRIM.    PAST SURGICAL HISTORY:  Grossly negative.    MEDICATIONS:  Med rec form was reviewed.  She was  chronically on midodrine,   hydrocortisone, extended-release morphine, Dilaudid.  She is also on Lyrica,   buprenorphine patch and estradiol.  Current inpatient medications are reviewed   and they include aspirin, Norco, oral hydrocortisone, Keppra, miconazole   cream, midodrine, mirtazapine, potassium, Lyrica.    FAMILY HISTORY:  Not obtainable.    SOCIAL HISTORY:  Per the records, no history of smoking or drinking to excess   or drug use beyond chronic narcotic use.    REVIEW OF SYSTEMS:  Unobtainable.  Patient unresponsive on a ventilator.    PHYSICAL EXAMINATION:  VITAL SIGNS:  Review of vital signs at the time of transfer revealed pretty   normal series of vitals on neurology floor.  On arrival, her heart rate was in   the 160s.  It was sinus tach on the monitor and by EKG.  Respiratory rate was   riding on the ventilator at 20.  Afebrile with a temperature around 36.8.    Blood pressure was a bit labile between 90 and 100 most of the time with her   one pressure in the 60s.  NEUROLOGIC:  She was very diaphoretic.  She is a slender woman who appears her   stated age, but appeared acutely and chronically ill.  She is unresponsive to   verbal and tactile stimuli.  Pupils were dilated, and the patient had some   lateral nystagmus when she first arrived and they were a little disconjugate   when they were not moving.  Followup eval not long after revealed conjugate   pupils, midposition that were reactive.  She had no obvious other cranial   nerve deficits, but did not participate.  Her neck was supple.  There are no   meningeal signs.  She would not withdraw in any of her extremities.  In fact,   she had rigid extension of both lower extremities and did not withdraw on the   upper extremities.  If anything, had some decerebrate posturing, more so on   the left upper extremity than the right upper extremity, to tactile stimuli.    With time, she has resolved that and now she just withdraws in all 4   extremities.   Plantar reflexes are indeterminate.  CHEST:  Revealed clear lungs.  No wheeze, rales or rhonchi.  ET tube is   orally.  CARDIAC:  Revealed tachycardic rate, regular rhythm that is improved to sinus   rhythm in the normal range with rehydration.  ABDOMEN:  Flat, soft, nontender.  No obvious guarding, rebound, rigidity or   organomegaly.  Flanks with no ecchymosis or tenderness.  EXTREMITIES:  No cyanosis, clubbing or edema.  In fact, if anything, she has   got some near tenting in the skin and decreased turgor.  No mass, lesions and   no IV until I emergently placed a central line in the right IJ position.    Visualization of that vein revealed normal caliber, but it collapsed easily.    It was very difficult to get in because central venous pressures appeared to   be extremely low.    Chest x-ray showed good position of the ET tube, normal cardiac size, central   line in good position or slightly into the atrium on the right.  There was no   pneumothorax.  Lungs were pretty clear without any mass, infiltrate or   effusion.    CT scan head was obtained and it revealed left frontoparietal infarction was   again noted with some cortical laminar necrosis in that infarction or some   evolution.  There were mild periventricular and subcortical white matter   changes and nonspecific small vessel ischemic changes noted.  No acute bleed,   no new significant stroke or other abnormality was identified.  CT chest dated   04/14/2018 revealed minimal atelectasis or fibrotic change at the right base,   otherwise clear chest x-ray, no adenopathy, and evidence of prior   cholecystectomy.    Echocardiogram on 04/10 revealed mildly dilated right ventricle, LV ejection   fraction 70%, RVSP 33.  LACI done on 04/17 again revealed normal right and left   ventricular function, but a patent foramen ovale was identified with color   Doppler with predominantly left to right flow and no valvular abnormalities.    Last EEG was actually on the  04/24, and there was no focal or generalized   epileptiform activity.    LABORATORY DATA:  White count 16.9, hematocrit 44.9, platelet count 329,   differential noted.  Chemistries:  Sodium 135, potassium 4.0, chloride 103,   bicarbonate 19, anion gap 13, glucose 168, BUN 31, creatinine 1.13.    Approximately 8 days ago, BUN 9, creatinine 0.61.  Calcium 9.5.  Normal   transaminases, alkaline phosphatase, bilirubin.  Albumin 3.9.  Normal   magnesium, 2.2.  Lactic acid elevated at 4.7.  Glucose 125.  Factor V Leiden   study negative.  Protein C and protein S functional assays normal.  Blood gas,   pO2 of 242, pCO2 of 37, pH 7.42 on full support.  Campylobacter antigen was   positive back on 04/10, I believe in her stool.  Patient had negative   anticardiolipin antibody studies.  Anti-double stranded DNA was nondetected.    CARMEN was positive, 1:640.  Rest of the ___ panel grossly negative including   RNP, Conde, SSA52 and SSA60 studies along with Sjogren's anti-SB.    Microbiological studies from the beginning of this admission were all   negative.    MRI done on 04/12/2018 of the brain revealed multifocal areas of acute   infarction with largest area of infarction located superiorly in the left   frontal lobe extending into the cortex.  Small areas of acute infarction   noted, left posterior occipital lobe, right parietooccipital cortex, and right   hemicerebellum.  See Dr. Nolasco's full note for further details of the MRI   and neurological evaluation.    ASSESSMENT:  1.  Status post code blue with spontaneous return of circulation.  2.  Hypotension, presumably hypovolemic/dehydration.  3.  History of bilateral cerebrovascular accidents, abnormal MRI, recent admit   on 04/10 for altered mental status.  4.  Recurrent seizure today versus extension of cerebrovascular accident.    Neurology evaluating.  Suspect seizure.  5.  Patent foramen ovale by LACI.  6.  Dehydration clinically with low CVP, severe diaphoresis and  exam   consistent with dehydration.  7.  Transient metabolic acidosis, elevated anion gap and elevated lactic acid,   presumably related to her code blue, doubt sepsis.  8.  History of chronic hypotension, on midodrine.  9.  Chronic pain syndrome, on narcotics.  10.  History of chronic opiate use, query abuse, possible overdose with admit   on 04/10.  11.  History Campylobacter antigen positivity, status post antibiotic therapy.    RECOMMENDATIONS AND CLINICAL DECISION MAKING:  This patient is critically ill.    I spent in excess of 80 minutes caring for her.  The patient was profoundly   tachycardic and hypotensive initially.  She lacked any access intravenously.    Emergent right IJ central line was placed, and time spent placing line was   independent from critical care time.  She responded nicely to fluid   resuscitation, and pressors did not need to be started.  Urine output has   picked up, which was nil initially, and heart rates and blood pressure both   improved.  A CT scan head was obtained emergently and there is no obvious   bleed, and there is some evolution of the stroke, but nothing new.  She   clearly had some findings on exam when she arrived suggesting seizure activity   versus new stroke, and those actually have improved.  EEG has since been   obtained, and there is some suggestion of nonconvulsive status.  Her oral   medications were converted to IV, and we will place a Cortrak for nutrition   and ongoing enteral medication administration.  Keppra dose has already been   given, and neurology has been reconsulted in regards to seizure management;   she may need additional therapy.  We will monitor for need for a followup MRI.    No clear evidence of embolic process through her PFO that might explain   these recurrent problems, although she was physically active and working with   therapy when this episode occurred.  From reading through the chart and   looking at renal function, I's and O's, exam,  and vital signs, I strongly   suspect the patient has had poor p.o. intake the last few days, unfortunately,   and became profoundly dehydrated.  Orthostasis and hypotension may have   contributed to her code blue this morning.  Her lactic acid is up and I think   it was related to the acute process and not sepsis.  I do not believe we need   to place her on antibiotics or do a workup but will monitor closely for the   need.  No clear evidence of aspiration.  X-ray clear, minimal secretions, no   fever now, although the white count was up a little bit earlier.  There was   discussion about surgical closure ___, and neurology wanted patient to make a   choice of medically treating versus following up with cardiology.  We will get   them to weigh in again on that process as they reconsult today for presumed   seizures versus new CVA.  The ET tube was placed for airway protection.    Hopefully, with management of her neurological processes and improvement in   LOC, we will be able to wean the ventilator and extubate her quickly and get   her back to PT, OT and speech teams working with her.  She will definitely   need placement in a rehab facility, and that had been pending at the time of   this event.  Blood pressure is, if anything, a bit elevated now, so we will   hold off on restarting midodrine, but given her history, we may need to do   that soon.  Maintenance IV fluids are ordered.  I reviewed with the clinical   pharmacist all of her medications and converted the majority of them from   enteral to IV.  Cortrak is being placed this afternoon.  No family available   at this time, but we will speak with her POA when available in regards to her   scenario and our plans.  We will await followup neurological consultation with   Dr. Churchill.  I spoke to him by phone at length and I have reviewed the EEG with   his interpretation in the EHR.    Thank you very much for asking me to see this patient.  I will be happy to    follow along with you and assist in her care.  I have spoken with the   patient's hospitalist here in the [facility], Dr. Angel Ocampo, and we have reviewed   the case as well.       ____________________________________     MD EARLE RUSH / SKYLER    DD:  04/26/2018 16:34:36  DT:  04/26/2018 20:10:20    D#:  [id number]  Job#:  [id number]

## 2018-04-27 NOTE — PROGRESS NOTES
IMPRESSION  1. Seizure ( passing out this morning) rapid response team was called --- most likely non-convulsive seizures-- secondary to new and old embolic strokes?  2. Frontal lobe strokes-- embolic-- likely from heart?   3. Narcotic abuse, overdose?      MANAGEMENT    Rapid response team was called this morning 4/26  All of sudden, became unresponsive since then  Stat CT-- no ICH, subacute stroke remains  Questionable seizures  Will get stat EEG     Now intubated  Left limbs tonic postures  Eyes open, but no eye contacts    EEG later confirmed the non convulsive seizures- -- video EEG was started, seizure medication was increased      4/11/2018 MRI of brain   Multifocal areas of acute infarction with the largest area of infarction located superiorly in the left frontal lobe extending to the cortex. Much smaller areas of acute infarction are noted in the left posterior occipital lobe, right   parietal-occipital cortex, and right hemicerebellum.    ________________________________________________________________________    HX from Dr Nolasco  4/10./2018    A 52-year-old female who was found unresponsive,   incontinent of urine and bowel with evidence of narcotic overdose, which has   improved after receiving Narcan.  ________________________________________________________________________      Vitals:    04/26/18 1700 04/26/18 1715 04/26/18 1745 04/26/18 1824   BP:       Pulse: (!) 49 (!) 47 (!) 56    Resp: 20 20 20    Temp:       SpO2: 100%  100% 100%   Weight:       Height:         Physical Exam   Constitutional: She is well-developed, well-nourished, and in no distress.   HENT:   Head: Normocephalic.   Eyes: Pupils are equal, round, and reactive to light.   Abdominal: Soft.   Neurological:   Coma, intubated, left limb tonic, eyes open, no eye contact-- stat EEG was ordered     Review of Systems   Respiratory: Negative for hemoptysis.    Genitourinary: Negative for hematuria.   Musculoskeletal: Positive for  falls.   Neurological: Positive for seizures, loss of consciousness and weakness.   Psychiatric/Behavioral: Positive for memory loss.

## 2018-04-27 NOTE — RESPIRATORY CARE
Extubation    Cuff leak noted: Yes  Stridor present: No  O2 (LPM): 4  O2 Daily Delivery Respiratory : Nasal Cannula  Patient toleration: Tolerated well

## 2018-04-27 NOTE — PROGRESS NOTES
Spoke with pharmacy regarding sedation. Per pharmacist trying a fentanyl gtt might be more beneficial for EEG. Updated Dr. Lyle, per MD he will put in order.

## 2018-04-27 NOTE — CARE PLAN
Problem: Safety  Goal: Will remain free from injury    Intervention: Provide assistance with mobility  Not mobilized due to bed rest.   Intervention: Collaborate with Interdisciplinary Team for safe transfer and mobilization techniques  complete  Intervention: Educate patient and significant other/support system about adaptive mobility strategies and safe transfers  Patient educated on progressing her mobility tomorrow.     Goal: Will remain free from falls  Outcome: PROGRESSING AS EXPECTED  No falls during this shift. Patient understands the need to call for assistance.  Intervention: Assess risk factors for falls  Fall precautions in place. Patient educated to call for assistance.

## 2018-04-27 NOTE — PROGRESS NOTES
Pulmonary Critical Care Progress Note        Admit 4/10/2018    Chief Complaint: s/p code blue, alterred LOC, possible Sz/CVA    History of Present Illness:  Patient is a 53-year-old woman with a past   history of opioid use, chronic pain syndrome who was admitted on 04/10 for   altered mental status, subsequently felt to have left-sided as well as   right-sided cerebellar CVA.  She was seen by neurology, felt to possibly have   focal seizures and started on Keppra.  Patient had diarrhea complicating   course, and Campylobacter was identified and she was treated with antibiotics.    She clinically improved and got out of the ICU, was in the neurological   floor having completion of neurologic evaluation.  A LACI revealed a patent   foramen ovale.  Medical treatment versus surgical closure was being evaluated   by neurology.  The patient today was up with some physical therapy, and she   suddenly became unresponsive, apneic and pulseless.  Code blue was called, and   she was placed back in bed.  She had spontaneous return of circulation and   pulse.  She was unresponsive with no gag and was not protecting her airway.    Emergency room physician who responded to the code intubated her for airway   protection.  IV access was not obtainable and she was rapidly transferred to   the ICU.  She was very tachycardic in the 160s, but appeared to be sinus and   blood pressures in the 60s-90s and quite low.  Very diaphoretic on arrival.    No history from the patient.  She did have some possible focal seizure-like   activity or posturing.  Stat CT head was ordered to rule out CNS bleed.    ROS:  unable to perform due to the patient's inability to effectively communicate     Interval Events:  24 hour interval history reviewed     Vent day# 2  SBT -> good weans  Following well  No Sz  Extubated  Hemodynamics better after IVF    UO adequate  CT neg - evolving CVA/no bleed  Tm 100.8  TF goal  Loose stool  Skin break sown -  trumpet  EEG ok overnight  K/Mg/Phos repleted    PFSH:  No change.    General:  NAD, on full support the vent, appears stated age, starting to follow    Skin:  Warm and dry, no longer severely diaphoretic, no rash or lesion    Respiratory:  Echols Vent Mode: APVCMV, Rate (breaths/min): 18, Vt Target (mL): 360, PEEP/CPAP: 5, FiO2: 30, Static Compliance (ml / cm H2O): 57.4, Control VTE (exp VT): 362  Pulse Oximetry: 100 %  Ventilator mechanics and waveforms are reviewed at the bedside with RT  Secretions min          Exam: unlabored respirations, no intercostal retractions or accessory muscle use on full support   Clear anteriorly slight diminished breath sounds at the bases    ImagingCXR  I have personally reviewed the chest x-ray my impression is  noted above and films are reviewed with Team on rounds     Recent Labs      04/26/18   0806  04/26/18   1036   JMXFR71L  7.54*   --    [id number]  24.2*   --    SPIPO103L  133.1*   --    TRKD5ERF  99.0   --    ARTHCO3  20   --    ARTBE  0   --    ISTATAPH   --   7.414   ISTATAPCO2   --   36.9   ISTATAPO2   --   242*   ISTATATCO2   --   25   LHYKRIP8ICE   --   100*   ISTATARTHCO3   --   23.6   ISTATARTBE   --   -1   ISTATTEMP   --   98.3 F   ISTATFIO2   --   70   ISTATSPEC   --   Arterial   ISTATAPHTC   --   7.416   XYUPNBWO2OH   --   242*       HemoDynamics:  Pulse: 66, Heart Rate (Monitored): 61  Blood Pressure: 113/48, NIBP: 119/78  CVP (mm Hg): 3 MM HG    Exam: regular rate and rhythm, no ectopy, no murmur,   Imaging: Available data reviewed   ECHO - LACI 4/17  CONCLUSIONS  Normal left and right ventricular systolic function.  Patent foramen ovale identified with color Doppler with predominately   left to right flow.  No significant valve abnormalities.   No significant aortic arch or ascending aorta atherosclerosis seen.        Neuro:  GCS Total Maida Coma Score: 10       Exam: starting to follow simple commands, moving all extremities to request, withdraws all  extremities, creatinine nerves intact     Imaging: Available data reviewed   CT 4/26  Findings of a left frontoparietal infarction are again noted with development of cortical laminar necrosis.  There are mild periventricular and subcortical white matter changes present.  This finding is nonspecific and could be from previous small vessel ischemia, demyelination, or gliosis.    Fluids:  Intake/Output       04/25/18 0700 - 04/26/18 0659 04/26/18 0700 - 04/27/18 0659 04/27/18 0700 - 04/28/18 0659      0700-1859 1900-0659 Total 0700-1859 1900-0659 Total [id number] 1900-0659 Total       Intake    I.V.  --  -- --  3137.6  666.1 3803.7  --  -- --    Precedex Volume -- -- -- 62.6 66.1 128.7 -- -- --    IV Piggyback Volume -- -- -- 200 100 300 -- -- --    IV Volume (< Enter Name Here >) -- -- -- 2000 -- 2000 -- -- --    IV Volume (LR) -- -- --  -- -- --    Enteral  --  -- --  --  120 120  --  -- --    Free Water / Tube Flush -- -- -- -- 120 120 -- -- --    Total Intake -- -- -- 3137.6 786.1 3923.7 -- -- --       Output    Urine  --  -- --  425  759 1184  --  -- --    Number of Times Voided -- 1 x 1 x 1 x -- 1 x -- -- --    Output (mL) (Urinary Catheter Indwelling Catheter 16) -- -- --  -- -- --    Stool  --  -- --  --  -- --  --  -- --    Number of Times Stooled 1 x -- 1 x 1 x -- 1 x -- -- --    Total Output -- -- --  -- -- --       Net I/O     -- -- -- 2712.6 27.1 2739.7 -- -- --        Weight: 52.2 kg (115 lb 1.3 oz)  Recent Labs      04/26/18   0806   SODIUM  135   POTASSIUM  4.0   CHLORIDE  103   CO2  19*   BUN  31*   CREATININE  1.13   MAGNESIUM  2.2   CALCIUM  9.5       GI/Nutrition:  Exam: abdomen is soft and non-tender, normal active bowel sounds, no CVAT  Imaging: Available data reviewed  NPO  Liver Function  Recent Labs      04/26/18   0806   ALTSGPT  43   ASTSGOT  22   ALKPHOSPHAT  60   TBILIRUBIN  0.5   GLUCOSE  168*       Heme:  Recent Labs      04/26/18   0806  04/27/18    0500   RBC  4.93  4.12*   HEMOGLOBIN  15.0  12.7   HEMATOCRIT  44.9  36.6*   PLATELETCT  329  237       Infectious Disease:  Monitored Temp 2  Av.6 °C (99.6 °F)  Min: 36.3 °C (97.3 °F)  Max: 38.2 °C (100.8 °F)  Temp  Av.2 °C (98.9 °F)  Min: 36.7 °C (98.1 °F)  Max: 37.8 °C (100 °F)  Micro: reviewed  Recent Labs      18   0806  18   0500   WBC  16.9*  13.0*   NEUTSPOLYS  66.00  83.40*   LYMPHOCYTES  26.00  11.60*   MONOCYTES  6.00  4.20   EOSINOPHILS  0.00  0.10   BASOPHILS  0.00  0.20   ASTSGOT  22   --    ALTSGPT  43   --    ALKPHOSPHAT  60   --    TBILIRUBIN  0.5   --      Current Facility-Administered Medications   Medication Dose Frequency Provider Last Rate Last Dose   • dexmedetomidine (PRECEDEX) 400 mcg in D5W 100 mL infusion  0.1-1.5 mcg/kg/hr Continuous Fantasma Lyle M.D. 7.7 mL/hr at 18 0.6 mcg/kg/hr at 18   • Respiratory Care per Protocol   Continuous RT Fantasma Lyle M.D.       • chlorhexidine (PERIDEX) 0.12 % solution 15 mL  15 mL BID Fantasma Lyle M.D.   15 mL at 18   • MD ALERT...Adult ICU Electrolyte Replacement per Pharmacy Protocol   pharmacy to dose Fantasma Lyle M.D.       • midazolam (VERSED) 2 MG/2ML injection 1 mg  1 mg Q HOUR PRN Fantasma Lyle M.D.   1 mg at 18 0327    Or   • midazolam (VERSED) 2 MG/2ML injection 2 mg  2 mg Q HOUR PRN Fantasma Lyle M.D.        Or   • midazolam (VERSED) injection 5 mg  5 mg Q HOUR PRN Fantasma Lyle M.D.       • fentaNYL (SUBLIMAZE) injection 25 mcg  25 mcg Q HOUR PRN Fantasma Lyle M.D.   25 mcg at 04/26/18 1009    Or   • fentaNYL (SUBLIMAZE) injection 50 mcg  50 mcg Q HOUR PRN Fantasma Lyle M.D.   50 mcg at 18 1735    Or   • fentaNYL (SUBLIMAZE) injection 100 mcg  100 mcg Q HOUR PRN Fantasma Lyle M.D.       • ipratropium-albuterol (DUONEB) nebulizer solution 3 mL  3 mL Q2HRS PRN (RT) Fantasma Lyle M.D.       • hydrocortisone sodium succinate  PF (SOLU-CORTEF) 100 MG injection 50 mg  50 mg Q6HRS Fantasma Lyle M.D.   50 mg at 04/27/18 0544   • levETIRAcetam (KEPPRA) 1,000 mg in  mL IVPB  1,000 mg Q12HRS Fantasma Lyle M.D.   Stopped at 04/26/18 2023   • phenylephrine (GLENYS-SYNEPHRINE) 40,000 mcg in  mL Infusion  1-300 mcg/min Continuous Fantasma Lyle M.D.   Stopped at 04/26/18 1037   • lactated ringers infusion   Continuous Fantasma Lyle M.D. 125 mL/hr at 04/27/18 0320 125 mL at 04/27/18 0320   • Pharmacy Consult: Enteral tube feeding - review meds/change route/product selection  1 Each PRN Fantasma Lyle M.D.       • acetaminophen (TYLENOL) tablet 650 mg  650 mg Q6HRS PRN Fantasma Lyle M.D.       • aspirin (ASA) chewable tab 81 mg  81 mg DAILY Fantasma Lyle M.D.   81 mg at 04/26/18 2010   • HYDROcodone-acetaminophen (NORCO) 5-325 MG per tablet 1 Tab  1 Tab TID Fantasma Lyle M.D.   Stopped at 04/26/18 2100   • loperamide (IMODIUM) capsule 2 mg  2 mg 4X/DAY PRN Fantasma Lyle M.D.       • mirtazapine (REMERON) tablet 15 mg  15 mg QHS Fantasma Lyle M.D.   15 mg at 04/26/18 2009   • polyethylene glycol/lytes (MIRALAX) PACKET 1 Packet  1 Packet QDAY PRN Fantasma Lyle M.D.        And   • senna-docusate (PERICOLACE or SENOKOT S) 8.6-50 MG per tablet 2 Tab  2 Tab BID Fantasma Lyle M.D.   2 Tab at 04/26/18 2009    And   • magnesium hydroxide (MILK OF MAGNESIA) suspension 30 mL  30 mL QDAY PRN Fantasma Lyle M.D.        And   • bisacodyl (DULCOLAX) suppository 10 mg  10 mg QDAY PRN Fantasma Lyle M.D.       • potassium chloride SA (Kdur) tablet 20 mEq  20 mEq BID Fantasma Lyle M.D.   20 mEq at 04/26/18 2012   • pregabalin (LYRICA) capsule 150 mg  150 mg BID Fantasma Lyle M.D.   150 mg at 04/26/18 2008   • MD ALATORRE...Pharmacy to implement PROPOFOL CRITICAL CARE PROTOCOL 1 Each  1 Each PRN Fantasma Lyle M.D.       • propofol (DIPRIVAN) injection  0-80 mcg/kg/min Continuous  Fantasma Lyle M.D.       • fentaNYL (SUBLIMAZE) 50 mcg/mL in 50mL (Continuous Infusion)   Continuous Fantasma Lyle M.D.   25 mcg at 04/26/18 1833   • midodrine (PROAMATINE) tablet 10 mg  10 mg TID WITH MEALS Fantasma Lyle M.D.   Stopped at 04/26/18 1050   • miconazole 2%-zinc oxide (TOM) topical cream   Q6HR Jesús Palma D.O.   Stopped at 04/26/18 1400   • labetalol (NORMODYNE,TRANDATE) injection 5 mg  5 mg Q4HRS PRN Cierra Parker M.D.         Last reviewed on 4/10/2018  6:05 PM by Penny Burnett, Pharmacy Intern    Quality  Measures:  Labs reviewed, Radiology images reviewed and Medications reviewed                      Problems/plan:  Acute hypoxemic respiratory failure   Intubated 4/26   RT protocols   Serial ABG/chest x-ray/ventilator mechanics review   Spontaneous breathing trials when clinically appropriate    SBT accessible, trial extubation 4/27    Ventilator A-F bundle   Daily sedation vacation/mobilize every shift  Status post code blue with spontaneous return of circulation.   Query Hypotension, presumably hypovolemic/dehydration.   Monitor  History of bilateral CVAs, abnormal MRI, recent admit on 04/10 for altered mental status.   Follow-up CT 4/26 negative for new CVA, reveals recent CVA in evolution without bleed   Neurology following   Resume therapies when clinically appropriate  Recurrent seizure today versus extension of cerebrovascular accident.     Neurology evaluating.  Suspect seizure.   Keppra   Frequent neuro checks  Patent foramen ovale by LACI.   Reassess need for intervention as patient improved neurologically   Surgical consultation or to discharge versus arrange surgical consultation follow-up as an outpatient  Dehydration clinically with low CVP, severe diaphoresis and exam consistent with dehydration.   Markedly improve with aggressive IV fluid rehydration   Monitor   Tinea LR at 125 cc per hour ×1 more day  Transient metabolic acidosis, elevated anion gap and  elevated lactic acid, presumably related to her code blue, doubt sepsis.   Monitor  History of chronic hypotension, on midodrine.   Resume as clinically appropriate   Hypotension secondary to hypovolemia yesterday, now a bit hypertensive  Chronic pain syndrome, on narcotics.   Therapies, judicious narcotics, outpatient pain management evaluation?  History of chronic opiate use, query abuse, possible overdose with admit on 04/10.   Caution with narcotics  History Campylobacter antigen positivity, status post antibiotic therapy.   Monitor  Prophylaxis    Discussed patient condition and risk of morbidity and/or mortality with RN, RT, Pharmacy, Dietary, , Charge nurse / hot rounds and hospitalist and neurology.

## 2018-04-27 NOTE — RESPIRATORY CARE
Ventilator Weaning Update    Patient is on vent day 2.  SBT was tolerated for a minimum of 1 hours on settings of 5/5.    Wean parameters for this SBT were:  #FVC / Vital Capacity (liters) : 1800  NIF (cm H2O) : -29   Rapid Shallow Breathing Index (RR/VT): 13  RR (bpm): 9  Spontaneous VE: 5.4   Spontaneous VT: 474      Weaning Trial Stopped due to:: Pt weaned for 1 hour and returned to rest settings per protocol

## 2018-04-27 NOTE — CARE PLAN
Problem: Nutritional:  Goal: Nutrition support tolerated and meeting greater than 85% of estimated needs  Outcome: MET Date Met: 04/27/18

## 2018-04-27 NOTE — EEG PROGRESS NOTE
EEG 04/27/18 9:04 AM    EEG monitoring, improved since yesterday, no more epileptiform sharp  The patient woke up before last night    Will stop video EEG

## 2018-04-27 NOTE — CARE PLAN
Problem: Fluid Volume:  Goal: Will maintain balanced intake and output  Outcome: PROGRESSING AS EXPECTED  Pt severely dehydrated when arrived to the ICU. Pt has received two 1000ml bolus's and is currently on maintence fluid. u/o has picked up through out the shift. CVP 4-8    Problem: Respiratory:  Goal: Respiratory status will improve  Outcome: PROGRESSING SLOWER THAN EXPECTED  Pt was a RRT/respiratory code this am. Pt currently requiring mechanical ventilation.

## 2018-04-27 NOTE — PROGRESS NOTES
[facility] Hospitalist Progress Note    Date of Service: 2018    Chief Complaint  53 y.o. female admitted 4/10/2018 with altered mental status    Interval Problem Update  Ms. Dunn has a history of chronic pain syndrome that was up visiting from [facility] that was unresponsive. She had a buprenorphine patch which was taken off. She underwent a work up and was found to have multiple acute CVAs as well as seizures. On  she lost consciousness and was hypotensive. A code blue was called and she was intubated. CT head was negative for acute.   Her daughter is at bedside and we discussed her mother's condition at length. Her BP has improved with IV fluids and steroids.   She is undergoing continuous EEG monitoring.  Consultants/Specialty  Neurology  Critical care    Disposition  ICU        Review of Systems   Unable to perform ROS: Intubated      Physical Exam  Laboratory/Imaging   Hemodynamics  Temp (24hrs), Av.7 °C (98 °F), Min:36.4 °C (97.6 °F), Max:36.8 °C (98.2 °F)   Temperature: 36.7 °C (98.1 °F), Monitored Temp: 37.9 °C (100.2 °F)  Pulse  Av.6  Min: 12  Max: 157 Heart Rate (Monitored): (!) 59  Blood Pressure: 113/48, NIBP: 142/75 CVP (mm Hg): 5 MM HG    Respiratory  Echols Vent Mode: APVCMV, Rate (breaths/min): 20, PEEP/CPAP: 5, PEEP/CPAP: 5, FiO2: 30, P Peak (PIP): 14, P MEAN: 7.8   Respiration: 20, Pulse Oximetry: 100 %     Work Of Breathing / Effort: Vented  RUL Breath Sounds: Clear, RML Breath Sounds: Clear, RLL Breath Sounds: Diminished, JOLYNN Breath Sounds: Clear, LLL Breath Sounds: Diminished    Fluids    Intake/Output Summary (Last 24 hours) at 184  Last data filed at 18 1800   Gross per 24 hour   Intake          3137.64 ml   Output              425 ml   Net          2712.64 ml       Nutrition  Orders Placed This Encounter   Procedures   • Diet NPO     Standing Status:   Standing     Number of Occurrences:   1     Order Specific Question:   Type:     Answer:   Now [1]      Order Specific Question:   Restrict to:     Answer:   Strict [1]     Physical Exam   Constitutional: No distress.   thin   Neck:   Right IJ central line   Cardiovascular:   No murmur heard.  Sinus tachycardia   Pulmonary/Chest:   Vent, good air movement. Clear bilaterally   Abdominal: Soft. She exhibits no distension. There is no tenderness.   Genitourinary:   Genitourinary Comments: Bishop catheter   Musculoskeletal: She exhibits no edema or tenderness.   Neurological:   Sedated. Horizontal nystagmus. She does not follow commands.    Skin: Skin is warm and dry. She is not diaphoretic.   Nursing note and vitals reviewed.      Recent Labs      04/26/18   0806   WBC  16.9*   RBC  4.93   HEMOGLOBIN  15.0   HEMATOCRIT  44.9   MCV  91.1   MCH  30.4   MCHC  33.4*   RDW  49.0   PLATELETCT  329   MPV  10.6     Recent Labs      04/26/18   0806   SODIUM  135   POTASSIUM  4.0   CHLORIDE  103   CO2  19*   GLUCOSE  168*   BUN  31*   CREATININE  1.13   CALCIUM  9.5                      Assessment/Plan     * Stroke syndrome (CMS-[facility])   Assessment & Plan    Multifocal infarctions, largest in the left frontal lobe, left posterior occipital lobe, right parietal-occipital cortex, and right hemicerebellum, respectively  Neurology consulted and following    Carotid US normal.  LACI w bubble study showed PFO.  Neurology was discussed with the patient the option of surgical repair versus being on medical treatment with aspirin..  She has had therapies.  MRI Brain:  1.  Multifocal areas of acute infarction with the largest area of infarction located superiorly in the left frontal lobe extending to the cortex. Much smaller areas of acute infarction are noted in the left posterior occipital lobe, right   parietal-occipital cortex, and right hemicerebellum.    2.  Mild periventricular and juxtacortical white matter changes consistent with chronic microvascular ischemic gliosis            Acute respiratory failure with hypoxia (CMS-[facility])    Assessment & Plan    Requiring intubation on 4/26.  Pulmonology consulted.        Hypotension- (present on admission)   Assessment & Plan    She had been on hydrocortisone as an outpatient and is now on IV hydrocortisone  Cortisol level ordered to eval for MATILDA  IV pressors and IV fluids.        Seizure disorder as sequela of cerebrovascular accident (CMS-[facility])- (present on admission)   Assessment & Plan    On Keppra 1 gram BID.  On seizure precautions.  Neurology consulted  Continuous EEG        Drug overdose - unintentional- (present on admission)   Assessment & Plan    Buprenorphine patch removed PTA.   Patient was on high doses of narcotics at home for chronic pain and fibromyalgia                 Normocytic anemia- (present on admission)   Assessment & Plan    Monitor H&H        Chronic pain syndrome- (present on admission)   Assessment & Plan    Currently denies pain. Only required norco once overnight.  Wean Norco          Hypokalemia   Assessment & Plan    On replacement.        Campylobacter diarrhea- (present on admission)   Assessment & Plan    Campylobacter on cultures from transferring facility. C-diff negative here on 4/10/18.            Hyperglycemia   Assessment & Plan    A1C normal at 5.4. No hx of Dm.  This likely steroid induced.   Monitor blood glucoses.            Leukocytosis- (present on admission)   Assessment & Plan    WBC has remained elevated with elevated polys and low lymphs    Completed Unasyn and Azithro 4/16.  Procalcitonin was normal.  Clinically with no signs with active infection.   On Cortef which might contribute to leucocytosis.           Elevated brain natriuretic peptide (BNP) level- (present on admission)   Assessment & Plan    BNP 8000s on arrival.   TTE showed normal diastolic function & EF 70%.  .            Elevated troponin- (present on admission)   Assessment & Plan    No CP or hx of CAD.  Normal echocardiogram.  Repeat trended down.         Autonomic neuropathy-  (present on admission)   Assessment & Plan    Now normotensive. BP stable. Allow for permissive hypertension in light of multiple strokes.  On low dose Cortef  Midodrine was continued as the patient is getting occasional dizziness and lightheadedness.        Anorexia- (present on admission)   Assessment & Plan    Patient is depressed and anorexic thus Remeron had been started.  Now on cortrak feeding        Aspiration pneumonia (CMS-HCC)- (present on admission)   Assessment & Plan    Completed a course of Unasyn.  CT chest normal            Quality-Core Measures   Reviewed items::  Labs reviewed, Radiology images reviewed and Medications reviewed  Bishop catheter::  Unconscious / Sedated Patient on a Ventilator

## 2018-04-27 NOTE — PROGRESS NOTES
Cortrak Placement    Tube Team verified patient name and medical record number prior to tube placement.  Cortrak tube (55 inches, 10 Bahamian) placed at 77 cm in left nare.  Per Cortrak picture, tube appears to be in the small bowel.  Nursing Instructions: Awaiting KUB to confirm placement before use for medications or feeding. Once placement confirmed, flush tube with 30 ml of water, and then remove and save stylet, in patient medication drawer.

## 2018-04-27 NOTE — DISCHARGE PLANNING
Spoke with Jose at [facility]- Declined  [facility]  LM with  at [facility]  Spoke with Maritza at [facility], we had the wrong fax number. Refaxed to [phone].  LM with Rozina at [facility]  LM with Hawa Simmons at [facility]

## 2018-04-27 NOTE — THERAPY
"Speech Language Therapy dysphagia treatment completed.   Functional Status:  Patient was seen for clinical swallow re-assessment this date after a change in status 4/26 resulting in short intubation. Oral motor exam revealed no gross changes however, continued slight right labial asymmerty was once again noted. The patient declined majority of PO trials initially however, following extensive education patient agreed to PO trials of ice chips, nectars, purees, thin liquids and solids. The patient consumed PO trials of ice chips, nectars, purees and thin liquids with no overt s/s of aspiration or vocal quality changes. The patient had slight increased wet vocal quality following mixed consistency solids. Soft solids were consumed with slightly prolonged mastication but appeared functional for modified PO diet of D2/thin liquids. SLP following.     Recommendations: 1) Restart PO alimentation with D2/thin liquids. 2) Standby supervision to ensure tolerance, follow through with swallow strategies and safety.2) Given recent concerns for poor PO intake may want to keep cortrak in place until RD's re-assess PO intake/nutritional needs.      Plan of Care: Will benefit from Speech Therapy 3 times per week    Post-Acute Therapy: Discharge to a transitional care facility for continued skilled therapy services.    See \"Rehab Therapy-Acute\" Patient Summary Report for complete documentation.     "

## 2018-04-27 NOTE — CARE PLAN
Problem: Ventilation Defect:  Goal: Ability to achieve and maintain unassisted ventilation or tolerate decreased levels of ventilator support    Intervention: Support and monitor invasive and noninvasive mechanical ventilation  Adult Ventilation Update    Total Vent Days: 1  Event Summary: Not weaned, s/p code blue

## 2018-04-27 NOTE — PROGRESS NOTES
[facility] Hospitalist Progress Note    Date of Service: 2018    Chief Complaint  53 y.o. female admitted 4/10/2018 with altered mental status    Interval Problem Update  Ms. Dunn has a history of chronic pain syndrome that was up visiting from [facility] that was unresponsive. She had a buprenorphine patch which was taken off. She underwent a work up and was found to have multiple acute CVAs as well as seizures. On  she lost consciousness and was hypotensive. A code blue was called and she was intubated. CT head was negative for acute.   On  I met with daughter is at bedside and we discussed her mother's condition at length. Her BP has improved with IV fluids and steroids.   She is undergoing continuous EEG monitoring. Her mentation has improved. She was successfully extubated this morning. RN notes very significant diarrhea.  Consultants/Specialty  Neurology  Critical care. I discussed her condition with Dr. Lyle on ICU Hot Rounds.    Disposition  ICU        Review of Systems   Unable to perform ROS: Mental acuity      Physical Exam  Laboratory/Imaging   Hemodynamics  Temp (24hrs), Av.4 °C (99.4 °F), Min:37.3 °C (99.1 °F), Max:37.8 °C (100 °F)   Temperature: 37.3 °C (99.1 °F), Monitored Temp: 36.9 °C (98.4 °F)  Pulse  Av  Min: 12  Max: 157 Heart Rate (Monitored): (!) 103  NIBP: 125/88 CVP (mm Hg): 4 MM HG    Respiratory  Echols Vent Mode: APVCMV, Rate (breaths/min): 12, PEEP/CPAP: 5, PEEP/CPAP: 5, FiO2: 30, P Peak (PIP): 12, P MEAN: 7.4   Respiration: 14, Pulse Oximetry: 100 %     Work Of Breathing / Effort: Vented  RUL Breath Sounds: Clear, RML Breath Sounds: Clear;Diminished, RLL Breath Sounds: Diminished, JOLYNN Breath Sounds: Clear, LLL Breath Sounds: Diminished    Fluids    Intake/Output Summary (Last 24 hours) at 18 0935  Last data filed at 18 0800   Gross per 24 hour   Intake          4408.08 ml   Output             1508 ml   Net          2900.08 ml        Nutrition  Orders Placed This Encounter   Procedures   • Diet NPO     Standing Status:   Standing     Number of Occurrences:   1     Order Specific Question:   Type:     Answer:   Now [1]     Order Specific Question:   Restrict to:     Answer:   Strict [1]     Physical Exam   Constitutional: No distress.   thin   Neck:   Right IJ central line   Cardiovascular:   No murmur heard.  Sinus rhythm.    Pulmonary/Chest:   Nasal cannula oxygen.  good air movement.   Clear bilaterally   Abdominal: Soft. She exhibits no distension. There is no tenderness.   Genitourinary:   Genitourinary Comments: Bishop catheter   Musculoskeletal: She exhibits no edema or tenderness.   Neurological:   Sleepy, moderately confused.  She follows commands with equal strength bilaterally.     Skin: Skin is warm and dry. She is not diaphoretic.   Nursing note and vitals reviewed.      Recent Labs      04/26/18   0806  04/27/18   0500   WBC  16.9*  13.0*   RBC  4.93  4.12*   HEMOGLOBIN  15.0  12.7   HEMATOCRIT  44.9  36.6*   MCV  91.1  88.8   MCH  30.4  30.8   MCHC  33.4*  34.7   RDW  49.0  46.1   PLATELETCT  329  237   MPV  10.6  11.3     Recent Labs      04/26/18   0806  04/27/18   0500   SODIUM  135  139   POTASSIUM  4.0  3.6   CHLORIDE  103  108   CO2  19*  22   GLUCOSE  168*  155*   BUN  31*  19   CREATININE  1.13  0.66   CALCIUM  9.5  9.0                      Assessment/Plan     * Stroke syndrome (HCC)   Assessment & Plan    Multifocal infarctions, largest in the left frontal lobe, left posterior occipital lobe, right parietal-occipital cortex, and right hemicerebellum, respectively  Neurology consulted and following    Carotid US normal.  On aspirin  LACI w bubble study showed PFO.  Neurology was discussed with the patient the option of surgical repair versus being on medical treatment with aspirin..  She has had therapies.  She has had a poor appetite thus continue cortrak feeding.  She passed her swallow eval thus dysphagia II diet  ordered.    MRI Brain:  1.  Multifocal areas of acute infarction with the largest area of infarction located superiorly in the left frontal lobe extending to the cortex. Much smaller areas of acute infarction are noted in the left posterior occipital lobe, right   parietal-occipital cortex, and right hemicerebellum.    2.  Mild periventricular and juxtacortical white matter changes consistent with chronic microvascular ischemic gliosis            Acute respiratory failure with hypoxia (HCC)   Assessment & Plan    Requiring intubation on 4/26 and extubation on 4/27.  Pulmonology consulted.        Hypotension- (present on admission)   Assessment & Plan    She had been on hydrocortisone as an outpatient and is now on IV hydrocortisone  Cortisol level ordered to eval for MATILDA  IV pressors and IV fluids.        Seizure disorder as sequela of cerebrovascular accident (CMS-HCC)- (present on admission)   Assessment & Plan    On Keppra 1 gram BID.  Neurology consulted  Continuous EEG 4/26 did not reveal seizures.        Drug overdose - unintentional- (present on admission)   Assessment & Plan    Buprenorphine patch removed PTA.   Patient was on high doses of narcotics at home for chronic pain and fibromyalgia                 Normocytic anemia- (present on admission)   Assessment & Plan    Monitor H&H        Chronic pain syndrome- (present on admission)   Assessment & Plan    Continue Norco          Hypokalemia   Assessment & Plan    On replacement.        Campylobacter diarrhea- (present on admission)   Assessment & Plan    Campylobacter on cultures from transferring facility. C-diff negative here on 4/10/18.            Hyperglycemia   Assessment & Plan    A1C normal at 5.4. No hx of Dm.  This likely steroid induced.   Monitor blood glucoses.            Leukocytosis- (present on admission)   Assessment & Plan    WBC has remained elevated with elevated polys and low lymphs    Completed Unasyn and Azithro 4/16.  Procalcitonin  was normal.  Clinically with no signs with active infection.   On Cortef which might contribute to leucocytosis.           Elevated brain natriuretic peptide (BNP) level- (present on admission)   Assessment & Plan    BNP 8000s on arrival.   TTE showed normal diastolic function & EF 70%.  .            Elevated troponin- (present on admission)   Assessment & Plan    No CP or hx of CAD.  Normal echocardiogram.  Repeat trended down.         Autonomic neuropathy- (present on admission)   Assessment & Plan    Now normotensive.  On Cortef          Anorexia- (present on admission)   Assessment & Plan    Patient is depressed and anorexic thus Remeron had been started.  Now on cortrak feeding        Aspiration pneumonia (HCC)- (present on admission)   Assessment & Plan    Completed a course of Unasyn.  CT chest normal            Quality-Core Measures   Reviewed items::  Labs reviewed, Radiology images reviewed and Medications reviewed  Bishop catheter::  Critically Ill - Requiring Accurate Measurement of Urinary Output

## 2018-04-27 NOTE — CARE PLAN
Problem: Safety  Goal: Will remain free from injury  Outcome: PROGRESSING AS EXPECTED  Pt has remained free from injury. Bed in low position with bed alarm on. Pt has call light within reach and has been educated to use when needing assistance.     Problem: Respiratory:  Goal: Respiratory status will improve  Outcome: PROGRESSING AS EXPECTED  Pt extubated this am. Pt tolerating well.

## 2018-04-27 NOTE — PROGRESS NOTES
Assumed care at 0700. When assessing the patient she follows commands appropriately, moved all extremities, basic neuro reflexes intact, nods appropriately. Change from previous baseline.

## 2018-04-28 ENCOUNTER — APPOINTMENT (OUTPATIENT)
Dept: RADIOLOGY | Facility: MEDICAL CENTER | Age: 53
DRG: 917 | End: 2018-04-28
Attending: INTERNAL MEDICINE
Payer: COMMERCIAL

## 2018-04-28 PROBLEM — E87.6 HYPOKALEMIA: Status: RESOLVED | Noted: 2018-04-12 | Resolved: 2018-04-28

## 2018-04-28 LAB
ANION GAP SERPL CALC-SCNC: 6 MMOL/L (ref 0–11.9)
BASOPHILS # BLD AUTO: 0.2 % (ref 0–1.8)
BASOPHILS # BLD: 0.02 K/UL (ref 0–0.12)
BUN SERPL-MCNC: 15 MG/DL (ref 8–22)
CALCIUM SERPL-MCNC: 8.1 MG/DL (ref 8.5–10.5)
CHLORIDE SERPL-SCNC: 109 MMOL/L (ref 96–112)
CO2 SERPL-SCNC: 25 MMOL/L (ref 20–33)
CREAT SERPL-MCNC: 0.56 MG/DL (ref 0.5–1.4)
EOSINOPHIL # BLD AUTO: 0 K/UL (ref 0–0.51)
EOSINOPHIL NFR BLD: 0 % (ref 0–6.9)
ERYTHROCYTE [DISTWIDTH] IN BLOOD BY AUTOMATED COUNT: 48.6 FL (ref 35.9–50)
GLUCOSE SERPL-MCNC: 117 MG/DL (ref 65–99)
HCT VFR BLD AUTO: 31.4 % (ref 37–47)
HGB BLD-MCNC: 10.5 G/DL (ref 12–16)
IMM GRANULOCYTES # BLD AUTO: 0.06 K/UL (ref 0–0.11)
IMM GRANULOCYTES NFR BLD AUTO: 0.5 % (ref 0–0.9)
LYMPHOCYTES # BLD AUTO: 1.73 K/UL (ref 1–4.8)
LYMPHOCYTES NFR BLD: 13.7 % (ref 22–41)
MAGNESIUM SERPL-MCNC: 1.9 MG/DL (ref 1.5–2.5)
MCH RBC QN AUTO: 30.8 PG (ref 27–33)
MCHC RBC AUTO-ENTMCNC: 33.4 G/DL (ref 33.6–35)
MCV RBC AUTO: 92.1 FL (ref 81.4–97.8)
MONOCYTES # BLD AUTO: 0.67 K/UL (ref 0–0.85)
MONOCYTES NFR BLD AUTO: 5.3 % (ref 0–13.4)
NEUTROPHILS # BLD AUTO: 10.19 K/UL (ref 2–7.15)
NEUTROPHILS NFR BLD: 80.3 % (ref 44–72)
NRBC # BLD AUTO: 0 K/UL
NRBC BLD-RTO: 0 /100 WBC
PHOSPHATE SERPL-MCNC: 4.3 MG/DL (ref 2.5–4.5)
PLATELET # BLD AUTO: 172 K/UL (ref 164–446)
PMV BLD AUTO: 10.8 FL (ref 9–12.9)
POTASSIUM SERPL-SCNC: 4 MMOL/L (ref 3.6–5.5)
RBC # BLD AUTO: 3.41 M/UL (ref 4.2–5.4)
SODIUM SERPL-SCNC: 140 MMOL/L (ref 135–145)
WBC # BLD AUTO: 12.7 K/UL (ref 4.8–10.8)

## 2018-04-28 PROCEDURE — 700105 HCHG RX REV CODE 258: Performed by: INTERNAL MEDICINE

## 2018-04-28 PROCEDURE — 80048 BASIC METABOLIC PNL TOTAL CA: CPT

## 2018-04-28 PROCEDURE — 700102 HCHG RX REV CODE 250 W/ 637 OVERRIDE(OP): Performed by: INTERNAL MEDICINE

## 2018-04-28 PROCEDURE — A9270 NON-COVERED ITEM OR SERVICE: HCPCS | Performed by: INTERNAL MEDICINE

## 2018-04-28 PROCEDURE — 83735 ASSAY OF MAGNESIUM: CPT

## 2018-04-28 PROCEDURE — 84100 ASSAY OF PHOSPHORUS: CPT

## 2018-04-28 PROCEDURE — 99233 SBSQ HOSP IP/OBS HIGH 50: CPT | Performed by: HOSPITALIST

## 2018-04-28 PROCEDURE — 85025 COMPLETE CBC W/AUTO DIFF WBC: CPT

## 2018-04-28 PROCEDURE — A9270 NON-COVERED ITEM OR SERVICE: HCPCS | Performed by: HOSPITALIST

## 2018-04-28 PROCEDURE — 51798 US URINE CAPACITY MEASURE: CPT

## 2018-04-28 PROCEDURE — 700111 HCHG RX REV CODE 636 W/ 250 OVERRIDE (IP): Performed by: HOSPITALIST

## 2018-04-28 PROCEDURE — 71045 X-RAY EXAM CHEST 1 VIEW: CPT

## 2018-04-28 PROCEDURE — 700111 HCHG RX REV CODE 636 W/ 250 OVERRIDE (IP): Performed by: INTERNAL MEDICINE

## 2018-04-28 PROCEDURE — 770022 HCHG ROOM/CARE - ICU (200)

## 2018-04-28 PROCEDURE — 700102 HCHG RX REV CODE 250 W/ 637 OVERRIDE(OP): Performed by: HOSPITALIST

## 2018-04-28 RX ORDER — LEVETIRACETAM 500 MG/1
500 TABLET ORAL 2 TIMES DAILY
Status: DISCONTINUED | OUTPATIENT
Start: 2018-04-28 | End: 2018-05-18 | Stop reason: HOSPADM

## 2018-04-28 RX ORDER — MAGNESIUM SULFATE HEPTAHYDRATE 40 MG/ML
2 INJECTION, SOLUTION INTRAVENOUS ONCE
Status: COMPLETED | OUTPATIENT
Start: 2018-04-28 | End: 2018-04-28

## 2018-04-28 RX ORDER — OXYCODONE HYDROCHLORIDE 10 MG/1
10 TABLET ORAL EVERY 4 HOURS PRN
Status: DISCONTINUED | OUTPATIENT
Start: 2018-04-28 | End: 2018-05-17

## 2018-04-28 RX ORDER — HYDROCORTISONE 20 MG/1
20 TABLET ORAL DAILY
Status: DISCONTINUED | OUTPATIENT
Start: 2018-04-28 | End: 2018-05-02

## 2018-04-28 RX ORDER — OXYCODONE HYDROCHLORIDE 5 MG/1
5-10 TABLET ORAL EVERY 4 HOURS PRN
Status: DISCONTINUED | OUTPATIENT
Start: 2018-04-28 | End: 2018-04-28

## 2018-04-28 RX ORDER — OXYCODONE HYDROCHLORIDE 5 MG/1
5 TABLET ORAL EVERY 4 HOURS PRN
Status: DISCONTINUED | OUTPATIENT
Start: 2018-04-28 | End: 2018-05-18 | Stop reason: HOSPADM

## 2018-04-28 RX ADMIN — ASPIRIN 81 MG: 81 TABLET, CHEWABLE ORAL at 08:32

## 2018-04-28 RX ADMIN — SODIUM CHLORIDE 1000 MG: 9 INJECTION, SOLUTION INTRAVENOUS at 09:34

## 2018-04-28 RX ADMIN — OXYCODONE HYDROCHLORIDE 5 MG: 5 TABLET ORAL at 23:35

## 2018-04-28 RX ADMIN — HYDROCODONE BITARTRATE AND ACETAMINOPHEN 1 TABLET: 5; 325 TABLET ORAL at 21:18

## 2018-04-28 RX ADMIN — ENOXAPARIN SODIUM 40 MG: 100 INJECTION SUBCUTANEOUS at 08:33

## 2018-04-28 RX ADMIN — PREGABALIN 150 MG: 75 CAPSULE ORAL at 08:32

## 2018-04-28 RX ADMIN — MIDODRINE HYDROCHLORIDE 10 MG: 5 TABLET ORAL at 12:39

## 2018-04-28 RX ADMIN — MICONAZOLE NITRATE: 20 CREAM TOPICAL at 08:00

## 2018-04-28 RX ADMIN — LEVETIRACETAM 500 MG: 500 TABLET, FILM COATED ORAL at 21:20

## 2018-04-28 RX ADMIN — PREGABALIN 150 MG: 75 CAPSULE ORAL at 21:18

## 2018-04-28 RX ADMIN — MICONAZOLE NITRATE: 20 CREAM TOPICAL at 02:00

## 2018-04-28 RX ADMIN — HYDROCORTISONE SODIUM SUCCINATE 50 MG: 100 INJECTION, POWDER, FOR SOLUTION INTRAMUSCULAR; INTRAVENOUS at 05:14

## 2018-04-28 RX ADMIN — SODIUM CHLORIDE, POTASSIUM CHLORIDE, SODIUM LACTATE AND CALCIUM CHLORIDE: 600; 310; 30; 20 INJECTION, SOLUTION INTRAVENOUS at 06:38

## 2018-04-28 RX ADMIN — OXYCODONE HYDROCHLORIDE 10 MG: 10 TABLET ORAL at 00:28

## 2018-04-28 RX ADMIN — MICONAZOLE NITRATE: 20 CREAM TOPICAL at 20:00

## 2018-04-28 RX ADMIN — MIDODRINE HYDROCHLORIDE 10 MG: 5 TABLET ORAL at 08:32

## 2018-04-28 RX ADMIN — HYDROCODONE BITARTRATE AND ACETAMINOPHEN 1 TABLET: 5; 325 TABLET ORAL at 08:32

## 2018-04-28 RX ADMIN — POTASSIUM CHLORIDE 20 MEQ: 1500 TABLET, EXTENDED RELEASE ORAL at 08:33

## 2018-04-28 RX ADMIN — HYDROCORTISONE SODIUM SUCCINATE 50 MG: 100 INJECTION, POWDER, FOR SOLUTION INTRAMUSCULAR; INTRAVENOUS at 13:39

## 2018-04-28 RX ADMIN — SODIUM CHLORIDE, POTASSIUM CHLORIDE, SODIUM LACTATE AND CALCIUM CHLORIDE 1000 ML: 600; 310; 30; 20 INJECTION, SOLUTION INTRAVENOUS at 16:13

## 2018-04-28 RX ADMIN — MAGNESIUM SULFATE IN WATER 2 G: 40 INJECTION, SOLUTION INTRAVENOUS at 14:58

## 2018-04-28 RX ADMIN — MIDODRINE HYDROCHLORIDE 10 MG: 5 TABLET ORAL at 18:20

## 2018-04-28 RX ADMIN — MICONAZOLE NITRATE: 20 CREAM TOPICAL at 14:00

## 2018-04-28 ASSESSMENT — ENCOUNTER SYMPTOMS
ABDOMINAL PAIN: 0
CONSTIPATION: 0
NERVOUS/ANXIOUS: 0
DIAPHORESIS: 0
HEADACHES: 0
SPEECH CHANGE: 0
SHORTNESS OF BREATH: 0
SINUS PAIN: 0
BLURRED VISION: 0
NECK PAIN: 0
ORTHOPNEA: 0
DIARRHEA: 0
SENSORY CHANGE: 0
PHOTOPHOBIA: 0
LOSS OF CONSCIOUSNESS: 0
POLYDIPSIA: 0
NAUSEA: 0
CLAUDICATION: 0
DEPRESSION: 0
HALLUCINATIONS: 0
FEVER: 0
COUGH: 1
MYALGIAS: 0
BACK PAIN: 0
PALPITATIONS: 0
VOMITING: 0
TINGLING: 0
FOCAL WEAKNESS: 0
DOUBLE VISION: 0
EYE PAIN: 0
STRIDOR: 0
HEMOPTYSIS: 0
TREMORS: 0
BRUISES/BLEEDS EASILY: 0
CHILLS: 0
SPUTUM PRODUCTION: 0

## 2018-04-28 ASSESSMENT — PAIN SCALES - GENERAL
PAINLEVEL_OUTOF10: 3
PAINLEVEL_OUTOF10: 2
PAINLEVEL_OUTOF10: 2
PAINLEVEL_OUTOF10: 3
PAINLEVEL_OUTOF10: 4
PAINLEVEL_OUTOF10: 1
PAINLEVEL_OUTOF10: 5
PAINLEVEL_OUTOF10: 7
PAINLEVEL_OUTOF10: 5

## 2018-04-28 ASSESSMENT — LIFESTYLE VARIABLES: SUBSTANCE_ABUSE: 0

## 2018-04-28 NOTE — PROGRESS NOTES
[facility] Hospitalist Progress Note    Date of Service: 2018    Chief Complaint  53 y.o. female admitted 4/10/2018 with altered mental status    Interval Problem Update  Ms. Dunn has a history of chronic pain syndrome that was up visiting from [facility] that was unresponsive. She had a buprenorphine patch which was taken off. She underwent a work up and was found to have multiple acute CVAs as well as seizures. On  she lost consciousness and was hypotensive. A code blue was called and she was intubated. CT head was negative for acute.   On  I met with daughter is at bedside and we discussed her mother's condition at length. Her BP has improved with IV fluids and steroids.   She is undergoing continuous EEG monitoring. Her mentation has improved. She was successfully extubated . RN notes very significant diarrhea.  Her friend is visiting from [place]. Ms. Dunn and I discussed the need for oral fluids and intake.  Consultants/Specialty  Neurology  Critical care. I discussed her condition with Dr. Rutherford on ICU Hot Rounds.    Disposition  ICU        Review of Systems   Unable to perform ROS: Mental acuity      Physical Exam  Laboratory/Imaging   Hemodynamics  Temp (24hrs), Av.4 °C (99.4 °F), Min:37.1 °C (98.8 °F), Max:38 °C (100.4 °F)   Temperature: 37.3 °C (99.1 °F), Monitored Temp: 37.2 °C (98.96 °F)  Pulse  Av.1  Min: 12  Max: 157 Heart Rate (Monitored): 73  NIBP: 128/72 CVP (mm Hg): 4 MM HG    Respiratory      Respiration: 14, Pulse Oximetry: 94 %     Work Of Breathing / Effort: Mild  RUL Breath Sounds: Clear, RML Breath Sounds: Clear;Diminished, RLL Breath Sounds: Diminished, JOLYNN Breath Sounds: Clear, LLL Breath Sounds: Diminished    Fluids    Intake/Output Summary (Last 24 hours) at 18 1639  Last data filed at 18 1200   Gross per 24 hour   Intake             1930 ml   Output             1190 ml   Net              740 ml       Nutrition  Orders Placed This  Encounter   Procedures   • DIET ORDER     Standing Status:   Standing     Number of Occurrences:   1     Order Specific Question:   Diet:     Answer:   Regular [1]     Order Specific Question:   Texture/Fiber modifications:     Answer:   Dysphagia 2(Pureed/Chopped)specify fluid consistency(question 6) [2]     Order Specific Question:   Consistency/Fluid modifications:     Answer:   Thin Liquids [3]     Physical Exam   Constitutional: No distress.   Quite thin   Neck: Neck supple.   Cardiovascular:   No murmur heard.  Sinus rhythm.    Pulmonary/Chest:   Nasal cannula oxygen.  good air movement.   Clear bilaterally   Abdominal: Soft. She exhibits no distension. There is no tenderness.   scaffoid   Musculoskeletal: She exhibits no edema or tenderness.   Neurological:   Awake, alert, she remains mildly confused on timing of recent events  She follows commands with equal strength bilaterally.     Skin: Skin is warm and dry. She is not diaphoretic.   Psychiatric: She has a normal mood and affect.   Nursing note and vitals reviewed.      Recent Labs      04/26/18   0806  04/27/18   0500  04/28/18   0520   WBC  16.9*  13.0*  12.7*   RBC  4.93  4.12*  3.41*   HEMOGLOBIN  15.0  12.7  10.5*   HEMATOCRIT  44.9  36.6*  31.4*   MCV  91.1  88.8  92.1   MCH  30.4  30.8  30.8   MCHC  33.4*  34.7  33.4*   RDW  49.0  46.1  48.6   PLATELETCT  329  237  172   MPV  10.6  11.3  10.8     Recent Labs      04/26/18   0806  04/27/18   0500  04/28/18   0520   SODIUM  135  139  140   POTASSIUM  4.0  3.6  4.0   CHLORIDE  103  108  109   CO2  19*  22  25   GLUCOSE  168*  155*  117*   BUN  31*  19  15   CREATININE  1.13  0.66  0.56   CALCIUM  9.5  9.0  8.1*                      Assessment/Plan     * Stroke syndrome (HCC)   Assessment & Plan    Multifocal infarctions, largest in the left frontal lobe, left posterior occipital lobe, right parietal-occipital cortex, and right hemicerebellum, respectively  Neurology consulted and following    Carotid  US normal.  On aspirin  LACI w bubble study showed PFO.  Neurology was discussed with the patient the option of surgical repair versus being on medical treatment with aspirin..  She has had therapies.  She has had a poor appetite and family/friends and staff will encourage oral intake  She passed her swallow eval thus dysphagia II diet ordered.    MRI Brain:  1.  Multifocal areas of acute infarction with the largest area of infarction located superiorly in the left frontal lobe extending to the cortex. Much smaller areas of acute infarction are noted in the left posterior occipital lobe, right   parietal-occipital cortex, and right hemicerebellum.    2.  Mild periventricular and juxtacortical white matter changes consistent with chronic microvascular ischemic gliosis            Acute respiratory failure with hypoxia (HCC)   Assessment & Plan    Requiring intubation on 4/26 and extubation on 4/27.  Pulmonology consulted.        Hypotension- (present on admission)   Assessment & Plan    She had been on hydrocortisone IV now transition to oral with a slow taper.  s/p IV pressors and IV fluids.        Seizure disorder as sequela of cerebrovascular accident (CMS-HCC)- (present on admission)   Assessment & Plan    On Keppra 1 gram BID.  Neurology consulted  Continuous EEG 4/26 did not reveal seizures.        Drug overdose - unintentional- (present on admission)   Assessment & Plan    Buprenorphine patch removed PTA.   Patient was on high doses of narcotics at home for chronic pain and fibromyalgia                 Normocytic anemia- (present on admission)   Assessment & Plan    Monitor H&H        Chronic pain syndrome- (present on admission)   Assessment & Plan    Continue Norco          Campylobacter diarrhea- (present on admission)   Assessment & Plan    Campylobacter on cultures from transferring facility. C-diff negative here on 4/10/18.            Hyperglycemia   Assessment & Plan    A1C normal at 5.4. No hx of  Dm.  This likely steroid induced.   Monitor blood glucoses.            Leukocytosis- (present on admission)   Assessment & Plan    WBC has remained elevated with elevated polys and low lymphs    Completed Unasyn and Azithro 4/16.  Procalcitonin was normal.  Clinically with no signs with active infection.   On Cortef which might contribute to leucocytosis.           Elevated brain natriuretic peptide (BNP) level- (present on admission)   Assessment & Plan    BNP 8000s on arrival.   TTE showed normal diastolic function & EF 70%.  .            Elevated troponin- (present on admission)   Assessment & Plan    No CP or hx of CAD.  Normal echocardiogram.  Repeat trended down.         Autonomic neuropathy- (present on admission)   Assessment & Plan    Now normotensive.  On Cortef          Anorexia- (present on admission)   Assessment & Plan    Patient is depressed and anorexic thus Remeron had been started.  Now on cortrak feeding        Aspiration pneumonia (HCC)- (present on admission)   Assessment & Plan    Completed a course of Unasyn.  CT chest normal  She was hypotensive requiring fluids and steroids  Hydrocortisone will be tapered.            Quality-Core Measures   Reviewed items::  Labs reviewed and Medications reviewed  DVT prophylaxis pharmacological::  Enoxaparin (Lovenox)

## 2018-04-28 NOTE — CARE PLAN
Problem: Pain Management  Goal: Pain level will decrease to patient's comfort goal  PRN pain medication added to MAR    Problem: Skin Integrity  Goal: Risk for impaired skin integrity will decrease  Pt on Q 2hr turns, barrier cream applied, waffle cushion in place.

## 2018-04-28 NOTE — PROGRESS NOTES
IMPRESSION    1. Seizure ( passing out 4/26) rapid response team was called --- most likely non-convulsive seizures-- secondary to new and old embolic strokes?  2. Frontal lobe strokes-- embolic-- likely from heart?   3. Narcotic abuse, overdose?      MANAGEMENT      EEG confirmed the non convulsive seizures- -- , seizure medication was increased  Now patient is back to baseline, she is awake and able to answer questions appropriately  Denied using drugs at home? Denied using alcohol at home?      ________________________________________________________________________      Guidelines (American Stroke Association)    TOAST Classification of stroke mechanism in this patient.    Presumed Mechanism by TOAST:    ___Large Artery Atherosclerosis--    ___Small Vessel (Lacunar)    X___Cardioembolic---    ___Other (Sickle Cell Disease, Vasculitis, Hypercoagulable State, etc)    ___Unknown    ________________________________________________________________________        ________________________________________________________________________    If circumstances change do not hesitate to re-consult neurology.     At this point, patient remains stable.    Advise follow up in outpatient neurology clinic in 3 months regarding management of seizure medication    She might benefit from Loop Recorder to monitor heart rate regarding her embolic strokes    Thank you for the consult.     Evangelina Churchill M.D.  [facility]  8:46 PM04/27/18    ________________________________________________________________________    Carotid Duplex   Report     Vascular Laboratory   CONCLUSIONS   No significant stenosis of the right or left carotid, subclavian, or    vertebral arteries.    4/11/2018 MRI of brain   Multifocal areas of acute infarction with the largest area of infarction located superiorly in the left frontal lobe extending to the cortex. Much smaller areas of acute infarction are noted in the left posterior occipital  lobe, right   parietal-occipital cortex, and right hemicerebellum.    ________________________________________________________________________    HX from Dr Nolasco  4/10./2018    A 52-year-old female who was found unresponsive,   incontinent of urine and bowel with evidence of narcotic overdose, which has   improved after receiving Narcan.  ________________________________________________________________________      Vitals:    04/27/18 1500 04/27/18 1600 04/27/18 1707 04/27/18 1800   BP:       Pulse: (!) 105 (!) 104 95 100   Resp: 15 14 15 16   Temp:       SpO2: 96% 91% 97% 95%   Weight:       Height:         Physical Exam   Constitutional: She is well-developed, well-nourished, and in no distress.   HENT:   Head: Normocephalic.   Eyes: Pupils are equal, round, and reactive to light.   Abdominal: Soft.   Neurological:   Coma, intubated, left limb tonic, eyes open, no eye contact-- stat EEG was ordered     Review of Systems   Respiratory: Negative for hemoptysis.    Genitourinary: Negative for hematuria.   Musculoskeletal: Positive for falls.   Neurological: Positive for seizures, loss of consciousness and weakness.   Psychiatric/Behavioral: Positive for memory loss.

## 2018-04-28 NOTE — PROGRESS NOTES
Pt continues to have frequent BM's. Order for rectal tube. Pt is refusing rectal tube. Pt educated on importance of maintaining skin integrity. Pt still refusing rectal tube. Bilateral buttocks very red. Barrier paste applied with each turn and clean up.

## 2018-04-28 NOTE — RESPIRATORY CARE
COPD EDUCATION by COPD CLINICAL EDUCATOR  4/27/2018 at 5:14 PM by Fannie Thao     Patient reviewed by COPD education team. Patient does not qualify for COPD program.

## 2018-04-28 NOTE — PROGRESS NOTES
Pulmonary Critical Care Progress Note      Date of service:  4/28/2018    Chief Complaint:   Mild cough    Interval Events:  24 hour interval history reviewed  Reason for visit:  Seizures, hypotension       - fully oriented   - SR-ST   -  last night   - ambulates   - RA   - transfer      She is feeling better today.  She denies a sore throat.  She has a mild dry cough which is improving.  She has no sputum production, hemoptysis, wheezing or dyspnea.  She denies chest pain, palpitations, edema or syncope.  She is tolerating her diet.  She denies abdominal pain, nausea or vomiting.  She denies any headaches, blurred vision, photophobia, diplopia, numbness, tingling or focal weakness.      Review of Systems   Constitutional: Negative for chills, diaphoresis and fever.   HENT: Negative for congestion, ear discharge, ear pain, nosebleeds, sinus pain and tinnitus.    Eyes: Negative for blurred vision, double vision, photophobia and pain.   Respiratory: Positive for cough (Mild). Negative for hemoptysis, sputum production, shortness of breath and stridor.    Cardiovascular: Negative for chest pain, palpitations, orthopnea, claudication and leg swelling.   Gastrointestinal: Negative for abdominal pain, constipation, diarrhea, nausea and vomiting.   Genitourinary: Negative for dysuria, frequency, hematuria and urgency.   Musculoskeletal: Negative for back pain, joint pain, myalgias and neck pain.   Skin: Negative for itching and rash.   Neurological: Negative for tingling, tremors, sensory change, speech change, focal weakness, loss of consciousness and headaches.   Endo/Heme/Allergies: Negative for environmental allergies and polydipsia. Does not bruise/bleed easily.   Psychiatric/Behavioral: Negative for depression, hallucinations, substance abuse and suicidal ideas. The patient is not nervous/anxious.        Physical Exam   Constitutional: She is oriented to person, place, and time and well-developed,  well-nourished, and in no distress. No distress.   HENT:   Head: Normocephalic and atraumatic.   Right Ear: External ear normal.   Left Ear: External ear normal.   Nose: Nose normal.   Mouth/Throat: Oropharynx is clear and moist. No oropharyngeal exudate.   Eyes: Conjunctivae are normal. Pupils are equal, round, and reactive to light. Right eye exhibits no discharge. Left eye exhibits no discharge. No scleral icterus.   Neck: Normal range of motion. Neck supple. No JVD present. No tracheal deviation present.   Cardiovascular: Regular rhythm, normal heart sounds and intact distal pulses.  Exam reveals no gallop and no friction rub.    No murmur heard.  Sinus rhythm   Pulmonary/Chest: Effort normal and breath sounds normal. No stridor. No respiratory distress. She has no wheezes. She has no rales.   Abdominal: Soft. Bowel sounds are normal. She exhibits no distension. There is no tenderness. There is no rebound.   Musculoskeletal: She exhibits no edema, tenderness or deformity.   No clubbing or cyanosis   Neurological: She is alert and oriented to person, place, and time. No cranial nerve deficit. Coordination normal. GCS score is 15.   No focal weakness   Skin: Skin is warm and dry. No rash noted. She is not diaphoretic. No erythema. No pallor.       PFSH:  No change.    Respiratory:     Pulse Oximetry: 99 %  CXR personally reviewed and compared to prior images  CXR with clear lungs    Recent Labs      04/26/18   0806  04/26/18   1036   DHEOC93Z  7.54*   --    [id number]  24.2*   --    RGCYH720M  133.1*   --    ERIF4FIV  99.0   --    ARTHCO3  20   --    ARTBE  0   --    ISTATAPH   --   7.414   ISTATAPCO2   --   36.9   ISTATAPO2   --   242*   ISTATATCO2   --   25   [id number]   --   100*   ISTATARTHCO3   --   23.6   ISTATARTBE   --   -1   ISTATTEMP   --   98.3 F   ISTATFIO2   --   70   ISTATSPEC   --   Arterial   ISTATAPHTC   --   7.416   [id number]   --   242*       HemoDynamics:  Pulse: 62, Heart Rate (Monitored):  62  NIBP: 140/89  CVP (mm Hg): (!) 0 MM HG    Neuro:    Fluids:  Intake/Output       04/26/18 0700 - 04/27/18 0659 04/27/18 0700 - 04/28/18 0659 04/28/18 0700 - 04/29/18 0659      0059-8229 1288-6588 Total 4361-4624 2466-6404 Total [id number] 1900-0659 Total       Intake    P.O.  --  -- --  --  30 30  --  -- --    P.O. -- -- -- -- 30 30 -- -- --    I.V.  3137.6  666.1 3803.7  2074.8  1600 3674.8  --  -- --    Magnesium Sulfate Volume -- -- -- 56.3 -- 56.3 -- -- --    Precedex Volume 62.6 66.1 128.7 18.5 -- 18.5 -- -- --    IV Piggyback Volume 200 100 300 -- 100 100 -- -- --    IV Volume (< Enter Name Here >) 2000 -- 2000 500 -- 500 -- -- --    IV Volume (LR)  1500 1500 3000 -- -- --    Other  --  -- --  110  -- 110  --  -- --    Medications (P.O./ Enteral Liquids) -- -- -- 110 -- 110 -- -- --    Enteral  --  120 120  90  50 140  --  -- --    Free Water / Tube Flush -- 120 120 90 -- 90 -- -- --    Intake (mL) ([REMOVED] Enteral Tube Left Nare Cortrak Gastric Feeding Tube) -- -- -- -- 50 50 -- -- --    Total Intake 3137.6 786.1 3923.7 2274.8 1680 3954.8 -- -- --       Output    Urine  425  017 7162  1164  440 1609  --  -- --    Number of Times Voided 1 x -- 1 x -- -- -- -- -- --    Output (mL) (Urinary Catheter Indwelling Catheter 16)  3143 528 1407 -- -- --    Stool  --  -- --  200  -- 200  --  -- --    Number of Times Stooled 1 x -- 1 x 5 x -- 5 x -- -- --    Measurable Stool (mL) -- -- -- 200 -- 200 -- -- --    Total Output  2853 293 0598 -- -- --       Net I/O     2712.6 27.1 2739.7 905.8 1240 2145.8 -- -- --           Recent Labs      04/26/18   0806  04/27/18   0500  04/28/18   0520   SODIUM  135  139  140   POTASSIUM  4.0  3.6  4.0   CHLORIDE  103  108  109   CO2  19*  22  25   BUN  31*  19  15   CREATININE  1.13  0.66  0.56   MAGNESIUM  2.2  1.8  1.9   PHOSPHORUS   --   2.3*  4.3   CALCIUM  9.5  9.0  8.1*       GI/Nutrition:    Liver Function  Recent Labs      04/26/18   0806   18   0500  18   0520   ALTSGPT  43   --    --    ASTSGOT  22   --    --    ALKPHOSPHAT  60   --    --    TBILIRUBIN  0.5   --    --    GLUCOSE  168*  155*  117*       Heme:  Recent Labs      18   0806  18   0500  18   0520   RBC  4.93  4.12*  3.41*   HEMOGLOBIN  15.0  12.7  10.5*   HEMATOCRIT  44.9  36.6*  31.4*   PLATELETCT  329  237  172       Infectious Disease:  Monitored Temp 2  Av.4 °C (99.3 °F)  Min: 36.9 °C (98.4 °F)  Max: 38 °C (100.4 °F)  Temp  Av.7 °C (99.8 °F)  Min: 37.2 °C (99 °F)  Max: 38 °C (100.4 °F)    Recent Labs      18   0806  18   0500  18   0520   WBC  16.9*  13.0*  12.7*   NEUTSPOLYS  66.00  83.40*  80.30*   LYMPHOCYTES  26.00  11.60*  13.70*   MONOCYTES  6.00  4.20  5.30   EOSINOPHILS  0.00  0.10  0.00   BASOPHILS  0.00  0.20  0.20   ASTSGOT  22   --    --    ALTSGPT  43   --    --    ALKPHOSPHAT  60   --    --    TBILIRUBIN  0.5   --    --      Current Facility-Administered Medications   Medication Dose Frequency Provider Last Rate Last Dose   • oxyCODONE immediate-release (ROXICODONE) tablet 5 mg  5 mg Q4HRS PRN Titi Batista Jr., D.O.        Or   • oxyCODONE immediate release (ROXICODONE) tablet 10 mg  10 mg Q4HRS PRN Titi Batista Jr., D.O.   10 mg at 18 0028   • enoxaparin (LOVENOX) inj 40 mg  40 mg DAILY Angel Ocampo M.D.   40 mg at 18 1144   • Respiratory Care per Protocol   Continuous RT Fantasma Lyle M.D.       • MD ALERT...Adult ICU Electrolyte Replacement per Pharmacy Protocol   pharmacy to dose Fantasma Lyle M.D.       • midazolam (VERSED) 2 MG/2ML injection 1 mg  1 mg Q HOUR PRN Fantasma Lyle M.D.   1 mg at 18 0327    Or   • midazolam (VERSED) 2 MG/2ML injection 2 mg  2 mg Q HOUR PRN Fantasma Lyle M.D.        Or   • midazolam (VERSED) injection 5 mg  5 mg Q HOUR PRN Fantasma Lyle M.D.       • ipratropium-albuterol (DUONEB) nebulizer solution 3 mL  3 mL Q2HRS PRN (RT) Fantasma  NIKKI Lyle M.D.       • hydrocortisone sodium succinate PF (SOLU-CORTEF) 100 MG injection 50 mg  50 mg Q6HRS Fantasma Lyle M.D.   50 mg at 04/28/18 0514   • levETIRAcetam (KEPPRA) 1,000 mg in  mL IVPB  1,000 mg Q12HRS Fantasma Lyle M.D.   Stopped at 04/27/18 2047   • lactated ringers infusion   Continuous Fantasma Lyle M.D. 125 mL/hr at 04/28/18 0638     • Pharmacy Consult: Enteral tube feeding - review meds/change route/product selection  1 Each PRN Fantasma Lyle M.D.       • acetaminophen (TYLENOL) tablet 650 mg  650 mg Q6HRS PRN Fantasma Lyle M.D.   650 mg at 04/27/18 2348   • aspirin (ASA) chewable tab 81 mg  81 mg DAILY Fantasma Lyle M.D.   81 mg at 04/27/18 0745   • HYDROcodone-acetaminophen (NORCO) 5-325 MG per tablet 1 Tab  1 Tab TID Fantasma Lyle M.D.   1 Tab at 04/27/18 2020   • loperamide (IMODIUM) capsule 2 mg  2 mg 4X/DAY PRN Fantasma Lyle M.D.       • polyethylene glycol/lytes (MIRALAX) PACKET 1 Packet  1 Packet QDAY PRN Fantasma Lyle M.D.        And   • senna-docusate (PERICOLACE or SENOKOT S) 8.6-50 MG per tablet 2 Tab  2 Tab BID Fantasma Lyle M.D.   Stopped at 04/27/18 0900    And   • magnesium hydroxide (MILK OF MAGNESIA) suspension 30 mL  30 mL QDAY PRN Fantasma Lyle M.D.        And   • bisacodyl (DULCOLAX) suppository 10 mg  10 mg QDAY PRN Fantasma Lyle M.D.       • potassium chloride SA (Kdur) tablet 20 mEq  20 mEq BID Fantasma Lyle M.D.   20 mEq at 04/27/18 2020   • pregabalin (LYRICA) capsule 150 mg  150 mg BID Fantasma Lyle M.D.   150 mg at 04/27/18 2020   • MD ALERT...Pharmacy to implement PROPOFOL CRITICAL CARE PROTOCOL 1 Each  1 Each PRN Fantasma Lyle M.D.       • midodrine (PROAMATINE) tablet 10 mg  10 mg TID WITH MEALS Fantasma Lyle M.D.   Stopped at 04/26/18 1050   • miconazole 2%-zinc oxide (TOM) topical cream   Q6HR Jesús Palma D.O.       • labetalol (NORMODYNE,TRANDATE) injection 5 mg   5 mg Q4HRS PRN Cierra Parker M.D.         Last reviewed on 4/10/2018  6:05 PM by Penny Burnett, Pharmacy Intern    Quality  Measures:  Labs reviewed, Medications reviewed and Radiology images reviewed  Bishop catheter: No Bishop      DVT Prophylaxis: Enoxaparin (Lovenox)  DVT prophylaxis - mechanical: SCDs  Ulcer prophylaxis: Not indicated          Assessment and Plan:    Acute hypoxemic respiratory failure   -Resolved   -Now on room air    Status post VDRF   -Intubated 4/26-4/27    Seizures   -Decrease Keppra, 500 mg twice a day    Hypotension   -History of chronic hypotension - continue midodrine   -Suspect due to intravascular volume depletion   -Resolved    Status post acute ischemic stroke   -Continue aspirin    Chronic pain   -Continue Lyrica    Patent foramen ovale on LACI   -Query need for closure      OK to transfer out of ICU.  [facility] will sign off on transfer.  Please call if you have any questions.    Discussed with RN, RT, team, hospitalist, clinical pharmacist

## 2018-04-28 NOTE — PROGRESS NOTES
Pt pulled cortrak out [facility]. Tube feeds stopped. cortrak placed back at 77. Xray ordered to confirm placement.

## 2018-04-29 LAB
BACTERIA BLD CULT: NORMAL
SIGNIFICANT IND 70042: NORMAL
SITE SITE: NORMAL
SOURCE SOURCE: NORMAL

## 2018-04-29 PROCEDURE — 700102 HCHG RX REV CODE 250 W/ 637 OVERRIDE(OP): Performed by: INTERNAL MEDICINE

## 2018-04-29 PROCEDURE — 770006 HCHG ROOM/CARE - MED/SURG/GYN SEMI*

## 2018-04-29 PROCEDURE — 99233 SBSQ HOSP IP/OBS HIGH 50: CPT | Performed by: HOSPITALIST

## 2018-04-29 PROCEDURE — 700102 HCHG RX REV CODE 250 W/ 637 OVERRIDE(OP): Performed by: HOSPITALIST

## 2018-04-29 PROCEDURE — 700111 HCHG RX REV CODE 636 W/ 250 OVERRIDE (IP): Performed by: INTERNAL MEDICINE

## 2018-04-29 PROCEDURE — 700111 HCHG RX REV CODE 636 W/ 250 OVERRIDE (IP): Performed by: HOSPITALIST

## 2018-04-29 PROCEDURE — A9270 NON-COVERED ITEM OR SERVICE: HCPCS | Performed by: INTERNAL MEDICINE

## 2018-04-29 PROCEDURE — A9270 NON-COVERED ITEM OR SERVICE: HCPCS | Performed by: HOSPITALIST

## 2018-04-29 RX ORDER — MAGNESIUM SULFATE HEPTAHYDRATE 40 MG/ML
2 INJECTION, SOLUTION INTRAVENOUS ONCE
Status: COMPLETED | OUTPATIENT
Start: 2018-04-29 | End: 2018-04-29

## 2018-04-29 RX ADMIN — HYDROCODONE BITARTRATE AND ACETAMINOPHEN 1 TABLET: 5; 325 TABLET ORAL at 15:14

## 2018-04-29 RX ADMIN — LEVETIRACETAM 500 MG: 500 TABLET, FILM COATED ORAL at 20:30

## 2018-04-29 RX ADMIN — HYDROCODONE BITARTRATE AND ACETAMINOPHEN 1 TABLET: 5; 325 TABLET ORAL at 20:29

## 2018-04-29 RX ADMIN — MAGNESIUM SULFATE IN WATER 2 G: 40 INJECTION, SOLUTION INTRAVENOUS at 10:57

## 2018-04-29 RX ADMIN — PREGABALIN 150 MG: 75 CAPSULE ORAL at 08:34

## 2018-04-29 RX ADMIN — MIDODRINE HYDROCHLORIDE 10 MG: 5 TABLET ORAL at 11:22

## 2018-04-29 RX ADMIN — HYDROCORTISONE 20 MG: 20 TABLET ORAL at 08:34

## 2018-04-29 RX ADMIN — ASPIRIN 81 MG: 81 TABLET, CHEWABLE ORAL at 08:34

## 2018-04-29 RX ADMIN — LEVETIRACETAM 500 MG: 500 TABLET, FILM COATED ORAL at 08:34

## 2018-04-29 RX ADMIN — HYDROCODONE BITARTRATE AND ACETAMINOPHEN 1 TABLET: 5; 325 TABLET ORAL at 08:34

## 2018-04-29 RX ADMIN — MICONAZOLE NITRATE: 20 CREAM TOPICAL at 02:00

## 2018-04-29 RX ADMIN — PREGABALIN 150 MG: 75 CAPSULE ORAL at 20:30

## 2018-04-29 RX ADMIN — ENOXAPARIN SODIUM 40 MG: 100 INJECTION SUBCUTANEOUS at 08:34

## 2018-04-29 RX ADMIN — MIDODRINE HYDROCHLORIDE 10 MG: 5 TABLET ORAL at 16:56

## 2018-04-29 RX ADMIN — MIDODRINE HYDROCHLORIDE 10 MG: 5 TABLET ORAL at 08:34

## 2018-04-29 ASSESSMENT — ENCOUNTER SYMPTOMS
ABDOMINAL PAIN: 0
CHILLS: 0
COUGH: 0
STRIDOR: 0
PALPITATIONS: 0
NAUSEA: 0
HEMOPTYSIS: 0
VOMITING: 0
DEPRESSION: 0
BLURRED VISION: 0
EYE PAIN: 0
FEVER: 0
HEADACHES: 0

## 2018-04-29 ASSESSMENT — PAIN SCALES - GENERAL
PAINLEVEL_OUTOF10: 3
PAINLEVEL_OUTOF10: 2
PAINLEVEL_OUTOF10: 0
PAINLEVEL_OUTOF10: 2
PAINLEVEL_OUTOF10: 4
PAINLEVEL_OUTOF10: 3
PAINLEVEL_OUTOF10: 0
PAINLEVEL_OUTOF10: 8
PAINLEVEL_OUTOF10: 0

## 2018-04-29 NOTE — CARE PLAN
Problem: Pain Management  Goal: Pain level will decrease to patient's comfort goal    Intervention: Follow pain managment plan developed in collaboration with patient and Interdisciplinary Team  Tolerating current regimen       Problem: Skin Integrity  Goal: Risk for impaired skin integrity will decrease    Intervention: Assess risk factors for impaired skin integrity and/or pressure ulcers  Patient turns in bed and able to apply sarahi by self.

## 2018-04-29 NOTE — PROGRESS NOTES
aaox3-4, mild expressive aphasia with slow response to questions, denies any discomfort,sz prec,  POC discussed, needs attended.

## 2018-04-29 NOTE — PROGRESS NOTES
Pulmonary Critical Care Progress Note      Date of service:  4/28/2018    Chief Complaint:   Mild cough    Interval Events:  24 hour interval history reviewed  Reason for visit:  Seizures, hypotension    - replace Mg   - room air      Review of Systems   Constitutional: Negative for chills and fever.   Eyes: Negative for blurred vision and pain.   Respiratory: Negative for cough, hemoptysis and stridor.    Cardiovascular: Negative for chest pain and palpitations.   Gastrointestinal: Negative for abdominal pain, nausea and vomiting.   Genitourinary: Negative for dysuria.   Musculoskeletal: Negative for joint pain.   Skin: Negative for rash.   Neurological: Negative for headaches.   Psychiatric/Behavioral: Negative for depression.       Physical Exam   Constitutional: She is oriented to person, place, and time and well-developed, well-nourished, and in no distress. No distress.   HENT:   Head: Normocephalic and atraumatic.   Right Ear: External ear normal.   Left Ear: External ear normal.   Nose: Nose normal.   Mouth/Throat: Oropharynx is clear and moist. No oropharyngeal exudate.   Eyes: Conjunctivae are normal. Pupils are equal, round, and reactive to light. Right eye exhibits no discharge. Left eye exhibits no discharge. No scleral icterus.   Neck: Normal range of motion. Neck supple. No JVD present. No tracheal deviation present.   Cardiovascular: Regular rhythm, normal heart sounds and intact distal pulses.  Exam reveals no gallop and no friction rub.    No murmur heard.  Sinus rhythm   Pulmonary/Chest: Effort normal and breath sounds normal. No stridor. No respiratory distress. She has no wheezes. She has no rales.   Abdominal: Soft. Bowel sounds are normal. She exhibits no distension. There is no tenderness. There is no rebound.   Musculoskeletal: She exhibits no edema, tenderness or deformity.   No clubbing or cyanosis   Neurological: She is alert and oriented to person, place, and time. No cranial nerve  deficit. Coordination normal. GCS score is 15.   No focal weakness   Skin: Skin is warm and dry. No rash noted. She is not diaphoretic. No erythema. No pallor.       PFSH:  No change.    Respiratory:     Pulse Oximetry: 93 %  CXR personally reviewed    Recent Labs      04/26/18   1036   ISTATAPH  7.414   ISTATAPCO2  36.9   ISTATAPO2  242*   ISTATATCO2  25   [id number]  100*   ISTATARTHCO3  23.6   ISTATARTBE  -1   ISTATTEMP  98.3 F   ISTATFIO2  70   ISTATSPEC  Arterial   ISTATAPHTC  7.416   [id number]  242*       HemoDynamics:  Pulse: 62, Heart Rate (Monitored): 95  Blood Pressure: 118/72, NIBP: 157/90  CVP (mm Hg): (!) -13 MM HG    Neuro:    Fluids:  Intake/Output       04/27/18 0700 - 04/28/18 0659 04/28/18 0700 - 04/29/18 0659 04/29/18 0700 - 04/30/18 0659      4220-4323 4064-5923 Total 0607-7479 3184-3547 Total 7684-0634 7248-8276 Total       Intake    P.O.  --  30 30  --  -- --  100  -- 100    P.O. -- 30 30 -- -- -- 100 -- 100    I.V.  2074.8  1600 3674.8  --  -- --  --  -- --    Magnesium Sulfate Volume 56.3 -- 56.3 -- -- -- -- -- --    Precedex Volume 18.5 -- 18.5 -- -- -- -- -- --    IV Piggyback Volume -- 100 100 -- -- -- -- -- --    IV Volume (< Enter Name Here >) 500 -- 500 -- -- -- -- -- --    IV Volume (LR) 1500 1500 3000 -- -- -- -- -- --    Other  110  -- 110  --  -- --  --  -- --    Medications (P.O./ Enteral Liquids) 110 -- 110 -- -- -- -- -- --    Enteral  90  50 140  --  -- --  --  -- --    Free Water / Tube Flush 90 -- 90 -- -- -- -- -- --    Intake (mL) ([REMOVED] Enteral Tube Left Nare Cortrak Gastric Feeding Tube) -- 50 50 -- -- -- -- -- --    Total Intake 2274.8 1680 3954.8 -- -- -- 100 -- 100       Output    Urine  1161 028 1608  850  -- 850  --  -- --    Number of Times Voided -- -- -- -- 2 x 2 x -- -- --    Output (mL) ([REMOVED] Urinary Catheter Indwelling Catheter 16) 3445 223 2118 850 -- 850 -- -- --    Stool  200  -- 200  --  -- --  --  -- --    Number of Times Stooled 5 x -- 5 x --  -- -- 0 x -- 0 x    Measurable Stool (mL) 200 -- 200 -- -- -- -- -- --    Total Output 0016 168 1131 850 -- 850 -- -- --       Net I/O     905.8 1240 2145.8 -850 -- -850 100 -- 100           Recent Labs      18   0500  18   0520   SODIUM  139  140   POTASSIUM  3.6  4.0   CHLORIDE  108  109   CO2  22  25   BUN  19  15   CREATININE  0.66  0.56   MAGNESIUM  1.8  1.9   PHOSPHORUS  2.3*  4.3   CALCIUM  9.0  8.1*       GI/Nutrition:    Liver Function  Recent Labs      18   0500  18   0520   GLUCOSE  155*  117*       Heme:  Recent Labs      18   0500  18   0520   RBC  4.12*  3.41*   HEMOGLOBIN  12.7  10.5*   HEMATOCRIT  36.6*  31.4*   PLATELETCT  237  172       Infectious Disease:  Monitored Temp 2  Av.4 °C (99.3 °F)  Min: 37.1 °C (98.78 °F)  Max: 38.1 °C (100.58 °F)  Temp  Av.2 °C (98.9 °F)  Min: 37 °C (98.6 °F)  Max: 37.3 °C (99.1 °F)    Recent Labs      18   0500  18   0520   WBC  13.0*  12.7*   NEUTSPOLYS  83.40*  80.30*   LYMPHOCYTES  11.60*  13.70*   MONOCYTES  4.20  5.30   EOSINOPHILS  0.10  0.00   BASOPHILS  0.20  0.20     Current Facility-Administered Medications   Medication Dose Frequency Provider Last Rate Last Dose   • oxyCODONE immediate-release (ROXICODONE) tablet 5 mg  5 mg Q4HRS PRN Titi Batista Jr., D.O.   5 mg at 18 2335    Or   • oxyCODONE immediate release (ROXICODONE) tablet 10 mg  10 mg Q4HRS PRN Titi Batista Jr., D.O.   10 mg at 18 0028   • levETIRAcetam (KEPPRA) tablet 500 mg  500 mg BID Angel Ocampo M.D.   500 mg at 04/29/18 0834   • hydrocortisone (CORTEF) tablet 20 mg  20 mg DAILY Angel Ocampo M.D.   20 mg at 18   • enoxaparin (LOVENOX) inj 40 mg  40 mg DAILY Angel Ocampo M.D.   40 mg at 18   • Respiratory Care per Protocol   Continuous RT Fantasma Lyle M.D.       • ipratropium-albuterol (DUONEB) nebulizer solution 3 mL  3 mL Q2HRS PRN (RT) Fantasma Lyle M.D.       • acetaminophen  (TYLENOL) tablet 650 mg  650 mg Q6HRS PRN Fantasma Lyle M.D.   650 mg at 04/27/18 2348   • aspirin (ASA) chewable tab 81 mg  81 mg DAILY Fantasma Lyle M.D.   81 mg at 04/29/18 0834   • HYDROcodone-acetaminophen (NORCO) 5-325 MG per tablet 1 Tab  1 Tab TID Fantasma Lyle M.D.   1 Tab at 04/29/18 0834   • loperamide (IMODIUM) capsule 2 mg  2 mg 4X/DAY PRN Fantasma Lyle M.D.       • polyethylene glycol/lytes (MIRALAX) PACKET 1 Packet  1 Packet QDAY PRN Fantasma Lyle M.D.        And   • senna-docusate (PERICOLACE or SENOKOT S) 8.6-50 MG per tablet 2 Tab  2 Tab BID Fantasma Lyle M.D.   Stopped at 04/27/18 0900    And   • magnesium hydroxide (MILK OF MAGNESIA) suspension 30 mL  30 mL QDAY PRN Fantasma Lyle M.D.        And   • bisacodyl (DULCOLAX) suppository 10 mg  10 mg QDAY PRN Fantasma Lyle M.D.       • pregabalin (LYRICA) capsule 150 mg  150 mg BID Fantasma Lyle M.D.   150 mg at 04/29/18 0834   • midodrine (PROAMATINE) tablet 10 mg  10 mg TID WITH MEALS Fantasma Lyle M.D.   10 mg at 04/29/18 0834   • miconazole 2%-zinc oxide (TOM) topical cream   Q6HR Jesús Palma D.O.       • labetalol (NORMODYNE,TRANDATE) injection 5 mg  5 mg Q4HRS PRN Cierra Parker M.D.         Last reviewed on 4/10/2018  6:05 PM by Penny Burnett, Pharmacy Intern    Quality  Measures:   Labs reviewed, Medications reviewed and Radiology images reviewed                      Assessment and Plan:    Acute hypoxemic respiratory failure   -Resolved   -Now on room air   -Intubated 4/26-4/27  Seizures   -Keppra, 500 mg twice a day  Hypotension   -History of chronic hypotension - continue midodrine   -Suspect due to intravascular volume depletion   -Resolved  Status post acute ischemic stroke   -Continue aspirin  Chronic pain   -Continue Lyrica  Patent foramen ovale on LACI   -Query need for closure  OK to transfer out of ICU.  [facility] will sign off on transfer.  Please call if you  have any questions.    Discussed with RN, RT, team, hospitalist, clinical pharmacist

## 2018-04-29 NOTE — CARE PLAN
Problem: Pain Management  Goal: Pain level will decrease to patient's comfort goal    Intervention: Follow pain managment plan developed in collaboration with patient and Interdisciplinary Team  Pain meds per MAR

## 2018-04-29 NOTE — CARE PLAN
Problem: Knowledge Deficit  Goal: Knowledge of disease process/condition, treatment plan, diagnostic tests, and medications will improve    Intervention: Explain information regarding disease process/condition, treatment plan, diagnostic tests, and medications and document in education  Transfer orders, sz prec, monitor vital signs

## 2018-04-29 NOTE — PROGRESS NOTES
[facility] Hospitalist Progress Note    Date of Service: 2018    Chief Complaint  53 y.o. female admitted 4/10/2018 with altered mental status    Interval Problem Update  Ms. Dunn has a history of chronic pain syndrome that was up visiting from [facility] that was unresponsive. She had a buprenorphine patch which was taken off. She underwent a work up and was found to have multiple acute CVAs as well as seizures. On  she lost consciousness and was hypotensive. A code blue was called and she was intubated. CT head was negative for acute.   On  I met with daughter is at bedside and we discussed her mother's condition at length. Her BP has improved with IV fluids and steroids.   She is undergoing continuous EEG monitoring. Her mentation has improved. She was successfully extubated . Ms. Dunn has been ambulating the hallways. She is looking forward to getting out of the ICU.  Consultants/Specialty  Neurology  Critical care. I discussed her condition with Dr. Rand on ICU Hot Rounds.    Disposition  ICU        Review of Systems   Constitutional: Negative for chills and fever.   Gastrointestinal:        Poor appetite though she is tolerating a diet   Neurological:        Strength is improving   All other systems reviewed and are negative.     Physical Exam  Laboratory/Imaging   Hemodynamics  Temp (24hrs), Av °C (98.6 °F), Min:37 °C (98.6 °F), Max:37 °C (98.6 °F)   Temperature: 37 °C (98.6 °F), Monitored Temp: 37.2 °C (99 °F)  Pulse  Av.4  Min: 12  Max: 157 Heart Rate (Monitored): 95  Blood Pressure: 118/72, NIBP: 157/90 CVP (mm Hg): (!) -13 MM HG    Respiratory      Respiration: 18, Pulse Oximetry: 93 %        RUL Breath Sounds: Clear, RML Breath Sounds: Clear, RLL Breath Sounds: Diminished, JOLYNN Breath Sounds: Clear, LLL Breath Sounds: Diminished    Fluids    Intake/Output Summary (Last 24 hours) at 18 1002  Last data filed at 18 0800   Gross per 24 hour   Intake               100 ml   Output              500 ml   Net             -400 ml       Nutrition  Orders Placed This Encounter   Procedures   • DIET ORDER     Standing Status:   Standing     Number of Occurrences:   1     Order Specific Question:   Diet:     Answer:   Regular [1]     Order Specific Question:   Texture/Fiber modifications:     Answer:   Dysphagia 2(Pureed/Chopped)specify fluid consistency(question 6) [2]     Order Specific Question:   Consistency/Fluid modifications:     Answer:   Thin Liquids [3]     Physical Exam   Constitutional: No distress.   Quite thin   Cardiovascular:   No murmur heard.  Sinus rhythm.    Pulmonary/Chest:   Nasal cannula oxygen.  good air movement.   Clear bilaterally   Abdominal: Soft. She exhibits no distension. There is no tenderness.   scaffoid   Musculoskeletal: She exhibits no edema or tenderness.   Neurological:   Awake, alert, equal strength     Skin: Skin is warm and dry. She is not diaphoretic.   Psychiatric:   She is in good spirits   Nursing note and vitals reviewed.      Recent Labs      04/27/18   0500  04/28/18   0520   WBC  13.0*  12.7*   RBC  4.12*  3.41*   HEMOGLOBIN  12.7  10.5*   HEMATOCRIT  36.6*  31.4*   MCV  88.8  92.1   MCH  30.8  30.8   MCHC  34.7  33.4*   RDW  46.1  48.6   PLATELETCT  237  172   MPV  11.3  10.8     Recent Labs      04/27/18   0500  04/28/18   0520   SODIUM  139  140   POTASSIUM  3.6  4.0   CHLORIDE  108  109   CO2  22  25   GLUCOSE  155*  117*   BUN  19  15   CREATININE  0.66  0.56   CALCIUM  9.0  8.1*                      Assessment/Plan     * Stroke syndrome (HCC)   Assessment & Plan    Multifocal ischemic infarcts  Neurology consulted and following    Carotid US normal.  On aspirin  LACI w bubble study showed PFO.  Neurology was discussed with the patient the option of surgical repair versus being on medical treatment with aspirin.  She has had therapies.  She has had a poor appetite and family/friends and staff will encourage oral intake  She passed  her swallow eval and has been tolerating a diet.    MRI Brain:  1.  Multifocal areas of acute infarction with the largest area of infarction located superiorly in the left frontal lobe extending to the cortex. Much smaller areas of acute infarction are noted in the left posterior occipital lobe, right   parietal-occipital cortex, and right hemicerebellum.    2.  Mild periventricular and juxtacortical white matter changes consistent with chronic microvascular ischemic gliosis            Acute respiratory failure with hypoxia (HCC)- (present on admission)   Assessment & Plan    Requiring intubation on 4/26 and extubation on 4/27.  Pulmonology consulted.        Hypotension- (present on admission)   Assessment & Plan    She had been on hydrocortisone IV now transitioned to oral with a taper.  s/p IV pressors and IV fluids.        Seizure disorder as sequela of cerebrovascular accident (HCC)- (present on admission)   Assessment & Plan    On Keppra per neurology.  Neurology consulted  Continuous EEG 4/26 did not reveal seizures.        Drug overdose - unintentional- (present on admission)   Assessment & Plan    Buprenorphine patch removed PTA.   Patient was on high doses of narcotics at home for chronic pain and fibromyalgia                 Normocytic anemia- (present on admission)   Assessment & Plan    Hb I s10.5        Chronic pain syndrome- (present on admission)   Assessment & Plan    Continue Norco          Campylobacter diarrhea- (present on admission)   Assessment & Plan    Campylobacter on cultures from transferring facility. C-diff negative here on 4/10/18.            Hyperglycemia   Assessment & Plan    A1C normal at 5.4. No hx of Dm.  This likely steroid induced.   Monitor blood glucoses.            Leukocytosis- (present on admission)   Assessment & Plan    WBC has remained elevated with elevated polys and low lymphs    Completed Unasyn and Azithro 4/16.  Procalcitonin was normal.  Clinically with no signs  with active infection.   On Cortef which might contribute to leucocytosis.           Elevated brain natriuretic peptide (BNP) level   Assessment & Plan    BNP 8000s on arrival.   TTE showed normal diastolic function & EF 70%.  .            Elevated troponin- (present on admission)   Assessment & Plan    No CP or hx of CAD.  Normal echocardiogram.  Repeat trended down.         Autonomic neuropathy- (present on admission)   Assessment & Plan    On midodrine and tapering hydrocortisone          Anorexia- (present on admission)   Assessment & Plan    Appropriate caloric intake discussed with patient and family.  She has required a PEG in the past.        Aspiration pneumonia (HCC)- (present on admission)   Assessment & Plan    Completed a course of Unasyn.  CT chest normal  She was hypotensive requiring fluids and steroids  Hydrocortisone will be tapered.            Quality-Core Measures   Reviewed items::  Medications reviewed  DVT prophylaxis pharmacological::  Enoxaparin (Lovenox)  Assessed for rehabilitation services:  Patient was assess for and/or received rehabilitation services during this hospitalization

## 2018-04-30 PROCEDURE — 99233 SBSQ HOSP IP/OBS HIGH 50: CPT | Performed by: INTERNAL MEDICINE

## 2018-04-30 PROCEDURE — A9270 NON-COVERED ITEM OR SERVICE: HCPCS | Performed by: INTERNAL MEDICINE

## 2018-04-30 PROCEDURE — 700111 HCHG RX REV CODE 636 W/ 250 OVERRIDE (IP): Performed by: HOSPITALIST

## 2018-04-30 PROCEDURE — A9270 NON-COVERED ITEM OR SERVICE: HCPCS | Performed by: HOSPITALIST

## 2018-04-30 PROCEDURE — 700102 HCHG RX REV CODE 250 W/ 637 OVERRIDE(OP): Performed by: INTERNAL MEDICINE

## 2018-04-30 PROCEDURE — 770006 HCHG ROOM/CARE - MED/SURG/GYN SEMI*

## 2018-04-30 PROCEDURE — 700102 HCHG RX REV CODE 250 W/ 637 OVERRIDE(OP): Performed by: HOSPITALIST

## 2018-04-30 RX ADMIN — LEVETIRACETAM 500 MG: 500 TABLET, FILM COATED ORAL at 20:07

## 2018-04-30 RX ADMIN — MIDODRINE HYDROCHLORIDE 10 MG: 5 TABLET ORAL at 09:46

## 2018-04-30 RX ADMIN — LEVETIRACETAM 500 MG: 500 TABLET, FILM COATED ORAL at 09:46

## 2018-04-30 RX ADMIN — MIDODRINE HYDROCHLORIDE 10 MG: 5 TABLET ORAL at 18:07

## 2018-04-30 RX ADMIN — HYDROCODONE BITARTRATE AND ACETAMINOPHEN 1 TABLET: 5; 325 TABLET ORAL at 20:07

## 2018-04-30 RX ADMIN — HYDROCORTISONE 20 MG: 20 TABLET ORAL at 09:46

## 2018-04-30 RX ADMIN — HYDROCODONE BITARTRATE AND ACETAMINOPHEN 1 TABLET: 5; 325 TABLET ORAL at 09:46

## 2018-04-30 RX ADMIN — ENOXAPARIN SODIUM 40 MG: 100 INJECTION SUBCUTANEOUS at 09:47

## 2018-04-30 RX ADMIN — MIDODRINE HYDROCHLORIDE 10 MG: 5 TABLET ORAL at 12:54

## 2018-04-30 RX ADMIN — PREGABALIN 150 MG: 75 CAPSULE ORAL at 09:46

## 2018-04-30 RX ADMIN — PREGABALIN 150 MG: 75 CAPSULE ORAL at 20:08

## 2018-04-30 RX ADMIN — HYDROCODONE BITARTRATE AND ACETAMINOPHEN 1 TABLET: 5; 325 TABLET ORAL at 14:50

## 2018-04-30 RX ADMIN — ASPIRIN 81 MG: 81 TABLET, CHEWABLE ORAL at 09:45

## 2018-04-30 ASSESSMENT — ENCOUNTER SYMPTOMS
NAUSEA: 0
NECK PAIN: 0
DIZZINESS: 0
BLURRED VISION: 0
COUGH: 0
PALPITATIONS: 0
FEVER: 0
HEARTBURN: 0
MYALGIAS: 0
DOUBLE VISION: 0
DEPRESSION: 0
SENSORY CHANGE: 0
HEMOPTYSIS: 0
HEADACHES: 0

## 2018-04-30 ASSESSMENT — PAIN SCALES - GENERAL
PAINLEVEL_OUTOF10: 2
PAINLEVEL_OUTOF10: 8
PAINLEVEL_OUTOF10: 6

## 2018-04-30 NOTE — CARE PLAN
Problem: Communication  Goal: The ability to communicate needs accurately and effectively will improve  Outcome: PROGRESSING SLOWER THAN EXPECTED  Expressive aphasia observed. Call light in reach. Bed alarm on. Patient sometimes impulsive and does not always use call light.    Problem: Knowledge Deficit  Goal: Knowledge of the prescribed therapeutic regimen will improve  Outcome: PROGRESSING SLOWER THAN EXPECTED  Needs reinforcement of plan of care.

## 2018-04-30 NOTE — PROGRESS NOTES
2 RN skin check completed. Gauze with tegaderm noted to right upper chest, redness with blanching noted to sacrum and bilateral buttocks, no other skin breakdown noted.

## 2018-04-30 NOTE — CARE PLAN
Problem: Respiratory:  Goal: Respiratory status will improve  Outcome: PROGRESSING AS EXPECTED  O2 sat 86% on room air. 2L NC applied with improvement of O2 sat to 95%. Patient refusing to wear supplemental O2 this am, education provided. Also encouraged deep breathing.    Problem: Mobility  Goal: Risk for activity intolerance will decrease  Outcome: PROGRESSING AS EXPECTED  Patient up to bathroom with min assist x 1. Gait steady with the occasional loss of balance. Bed alarm on.

## 2018-04-30 NOTE — PROGRESS NOTES
[facility] Hospitalist Progress Note    Date of Service: 2018    Chief Complaint  53 y.o. female admitted 4/10/2018 with ams    Interval Problem Update  : transferrred from icu    : stable currently, laying in bed, she was extubated on , requested for pt/ot reeval. Currently patient still has inadequate fluid, calorie intake. Will request dietitian consult. Will assess potential for snf. Neurology increased keppra dosing due to  Non convulsive seizure acitivty on EEG. Neuro suspects the stroke she had was likely embolic and they recommend considering loop recorder at the time of discharge.     Consultants/Specialty  Neurology, ICU    Disposition  Plan for snf        Review of Systems   Constitutional: Negative for fever.   Eyes: Negative for blurred vision and double vision.   Respiratory: Negative for cough and hemoptysis.    Cardiovascular: Negative for chest pain, palpitations and leg swelling.   Gastrointestinal: Negative for heartburn and nausea.   Genitourinary: Negative for dysuria.   Musculoskeletal: Negative for myalgias and neck pain.   Skin: Negative for rash.   Neurological: Negative for dizziness, sensory change and headaches.   Psychiatric/Behavioral: Negative for depression.      Physical Exam  Laboratory/Imaging   Hemodynamics  Temp (24hrs), Av.7 °C (98.1 °F), Min:36.5 °C (97.7 °F), Max:37.3 °C (99.1 °F)   Temperature: 36.5 °C (97.7 °F)  Pulse  Av.1  Min: 12  Max: 157    Blood Pressure: 140/94     Respiratory      Respiration: 15, Pulse Oximetry: 95 %     Work Of Breathing / Effort: (P) Mild  RUL Breath Sounds: Clear, RML Breath Sounds: Clear;Diminished, RLL Breath Sounds: Diminished, JOLYNN Breath Sounds: Clear, LLL Breath Sounds: Clear;Diminished    Fluids    Intake/Output Summary (Last 24 hours) at 18 1126  Last data filed at 18   Gross per 24 hour   Intake              320 ml   Output                0 ml   Net              320 ml       Nutrition  Orders Placed  This Encounter   Procedures   • DIET ORDER     Standing Status:   Standing     Number of Occurrences:   1     Order Specific Question:   Diet:     Answer:   Regular [1]     Order Specific Question:   Texture/Fiber modifications:     Answer:   Dysphagia 2(Pureed/Chopped)specify fluid consistency(question 6) [2]     Order Specific Question:   Consistency/Fluid modifications:     Answer:   Thin Liquids [3]     Physical Exam   Constitutional: No distress.   Thin white female   HENT:   Head: Atraumatic.   Eyes: Pupils are equal, round, and reactive to light.   Neck: Neck supple.   Cardiovascular: Normal rate, regular rhythm, normal heart sounds and intact distal pulses.    Pulmonary/Chest: Effort normal and breath sounds normal. No respiratory distress.   Abdominal: Soft. Bowel sounds are normal. She exhibits no distension.   Musculoskeletal: Normal range of motion. She exhibits no edema.   Neurological: She is alert. Coordination normal.   Equal and symmetrical strength b/l, improved activity tolerance since hospitalization   Skin: Skin is warm and dry.       Recent Labs      04/28/18   0520   WBC  12.7*   RBC  3.41*   HEMOGLOBIN  10.5*   HEMATOCRIT  31.4*   MCV  92.1   MCH  30.8   MCHC  33.4*   RDW  48.6   PLATELETCT  172   MPV  10.8     Recent Labs      04/28/18   0520   SODIUM  140   POTASSIUM  4.0   CHLORIDE  109   CO2  25   GLUCOSE  117*   BUN  15   CREATININE  0.56   CALCIUM  8.1*                      Assessment/Plan     * Stroke syndrome (HCC)   Assessment & Plan    Multifocal ischemic infarcts  Neurology consulted and following    Carotid US normal.  On aspirin  LACI w bubble study showed PFO.  Neurology was discussed with the patient the option of surgical repair versus being on medical treatment with aspirin.  She has had therapies.  She has had a poor appetite and family/friends and staff will encourage oral intake  She passed her swallow eval and has been tolerating a diet.    MRI Brain:  1.  Multifocal areas  of acute infarction with the largest area of infarction located superiorly in the left frontal lobe extending to the cortex. Much smaller areas of acute infarction are noted in the left posterior occipital lobe, right   parietal-occipital cortex, and right hemicerebellum.    2.  Mild periventricular and juxtacortical white matter changes consistent with chronic microvascular ischemic gliosis    - currently no focal deficits  - some balance deficits  - requested for pt/ot reeeval  - passed swallow eval        Acute respiratory failure with hypoxia (HCC)- (present on admission)   Assessment & Plan    Requiring intubation on 4/26 and extubation on 4/27.  Pulmonology consulted.  No post extubation complications        Hypotension- (present on admission)   Assessment & Plan    She had been on hydrocortisone IV now transitioned to oral with a taper.  s/p IV pressors and IV fluids.  Also has midodrine scheduled         Seizure disorder as sequela of cerebrovascular accident (HCC)- (present on admission)   Assessment & Plan    On Keppra per neurology.  Neurology consulted  Continuous EEG 4/26 done by , reviewed their notes        Drug overdose - unintentional- (present on admission)   Assessment & Plan    Buprenorphine patch removed PTA.   Patient was on high doses of narcotics at home for chronic pain and fibromyalgia                 Weakness   Assessment & Plan    Reordered pt/ot        Normocytic anemia- (present on admission)   Assessment & Plan    Hb I s10.5        Chronic pain syndrome- (present on admission)   Assessment & Plan    Continue Norco          Campylobacter diarrhea- (present on admission)   Assessment & Plan    Campylobacter on cultures from transferring facility. C-diff negative here on 4/10/18.            Hyperglycemia   Assessment & Plan    A1C normal at 5.4. No hx of Dm.  This likely steroid induced.   Monitor blood glucoses.            Leukocytosis- (present on admission)   Assessment & Plan     WBC has remained elevated with elevated polys and low lymphs    Completed Unasyn and Azithro 4/16.  Procalcitonin was normal.  Clinically with no signs with active infection.   On Cortef which might contribute to leucocytosis.           Elevated brain natriuretic peptide (BNP) level   Assessment & Plan    BNP 8000s on arrival.   TTE showed normal diastolic function & EF 70%.  .            Elevated troponin- (present on admission)   Assessment & Plan    No CP or hx of CAD.  Normal echocardiogram.  Repeat trended down.         Autonomic neuropathy- (present on admission)   Assessment & Plan    On midodrine and tapering hydrocortisone          Anorexia- (present on admission)   Assessment & Plan    Appropriate caloric intake discussed with patient and family.  She has required a PEG in the past.  Will request for dietitian consult        Aspiration pneumonia (HCC)- (present on admission)   Assessment & Plan    Completed a course of Unasyn.  CT chest normal  She was hypotensive requiring fluids and steroids  Hydrocortisone will be tapered.            Quality-Core Measures   Reviewed items::  Labs reviewed, Radiology images reviewed and Medications reviewed  Bishop catheter::  No Bishop  DVT prophylaxis pharmacological::  Enoxaparin (Lovenox)  Assessed for rehabilitation services:  Patient was assess for and/or received rehabilitation services during this hospitalization

## 2018-04-30 NOTE — DIETARY
Nutrition Services: Update   Pt was previously on TF. TF was d/c on 4/28. Pt is currently on regular, dysphagia 2 diet with thin liquids. Pt is receiving smoothies TID with meals and snacks TID (Boost Plus, yogurt, and high protein milkshake). Per chart pt PO 25-75% 3 meals documented. Wt 4/26: 52.2 kg via bed scale - wt loss percent from admit wt of 56.2 kg on 4/10 is 7.1% in ~2 weeks, which is severe.     Recommendations/Plan:  1. Encourage intake of meals  2. Document intake of all meals as % taken in ADL's to provide interdisciplinary communication across all shifts.   3. Monitor weight.  4. Nutrition rep will continue to see patient for ongoing meal and snack preferences.  5. Obtain supplement order per RD as needed.    RD following

## 2018-04-30 NOTE — DISCHARGE PLANNING
Per rounds, anticipating m/c tomorrow. [facility] following up on pending referrals.     Pt previously denied on JUANIS with all local facilities. [facility] Supervisor notified.

## 2018-04-30 NOTE — DISCHARGE PLANNING
Attempted to follow up with [facility], however, no answer. Voicemail left for Violet.  Followed up with [facility]. Per staff, she will locate referral and call this [facility] back. Followed up with [facility]. Per admissions staff, referral was not received as it was sent to incorrect fax number. Referral faxed to fax number [phone]. [facility] has declined patient as they are a psych facility.

## 2018-04-30 NOTE — PROGRESS NOTES
Left AC iv site removed d/t patient c/o pain at site, no redness or edema observed. IV restart now attempted x 3 RN's, the last nurse used US for 3 unsuccessful attempts.     placed on patient at bedtime, O2 sat 87% on room air, 2L/nc applied with improvement of O2 sat to 95%. Patient now refusing to wear nasal cannula despite repeated eduction on importance of keeping O2 sat > 90%, O2 sat still 86-88% on room air. No respiratory distress observed or reported.

## 2018-05-01 ENCOUNTER — APPOINTMENT (OUTPATIENT)
Dept: RADIOLOGY | Facility: MEDICAL CENTER | Age: 53
DRG: 917 | End: 2018-05-01
Attending: NURSE PRACTITIONER
Payer: COMMERCIAL

## 2018-05-01 PROCEDURE — 700111 HCHG RX REV CODE 636 W/ 250 OVERRIDE (IP): Performed by: HOSPITALIST

## 2018-05-01 PROCEDURE — A9270 NON-COVERED ITEM OR SERVICE: HCPCS | Performed by: INTERNAL MEDICINE

## 2018-05-01 PROCEDURE — 99232 SBSQ HOSP IP/OBS MODERATE 35: CPT | Performed by: HOSPITALIST

## 2018-05-01 PROCEDURE — 700102 HCHG RX REV CODE 250 W/ 637 OVERRIDE(OP): Performed by: INTERNAL MEDICINE

## 2018-05-01 PROCEDURE — 770006 HCHG ROOM/CARE - MED/SURG/GYN SEMI*

## 2018-05-01 PROCEDURE — 70450 CT HEAD/BRAIN W/O DYE: CPT

## 2018-05-01 PROCEDURE — 97530 THERAPEUTIC ACTIVITIES: CPT

## 2018-05-01 PROCEDURE — 700102 HCHG RX REV CODE 250 W/ 637 OVERRIDE(OP): Performed by: HOSPITALIST

## 2018-05-01 PROCEDURE — 700105 HCHG RX REV CODE 258: Performed by: NURSE PRACTITIONER

## 2018-05-01 PROCEDURE — 700102 HCHG RX REV CODE 250 W/ 637 OVERRIDE(OP): Performed by: NURSE PRACTITIONER

## 2018-05-01 PROCEDURE — 97535 SELF CARE MNGMENT TRAINING: CPT

## 2018-05-01 PROCEDURE — A9270 NON-COVERED ITEM OR SERVICE: HCPCS | Performed by: HOSPITALIST

## 2018-05-01 PROCEDURE — A9270 NON-COVERED ITEM OR SERVICE: HCPCS | Performed by: NURSE PRACTITIONER

## 2018-05-01 RX ORDER — SODIUM CHLORIDE 9 MG/ML
INJECTION, SOLUTION INTRAVENOUS CONTINUOUS
Status: DISCONTINUED | OUTPATIENT
Start: 2018-05-01 | End: 2018-05-03

## 2018-05-01 RX ADMIN — HYDROCODONE BITARTRATE AND ACETAMINOPHEN 1 TABLET: 5; 325 TABLET ORAL at 08:27

## 2018-05-01 RX ADMIN — OXYCODONE HYDROCHLORIDE 10 MG: 10 TABLET ORAL at 01:07

## 2018-05-01 RX ADMIN — ENOXAPARIN SODIUM 40 MG: 100 INJECTION SUBCUTANEOUS at 08:28

## 2018-05-01 RX ADMIN — MIDODRINE HYDROCHLORIDE 10 MG: 5 TABLET ORAL at 12:36

## 2018-05-01 RX ADMIN — HYDROCODONE BITARTRATE AND ACETAMINOPHEN 1 TABLET: 5; 325 TABLET ORAL at 20:13

## 2018-05-01 RX ADMIN — PREGABALIN 150 MG: 75 CAPSULE ORAL at 20:13

## 2018-05-01 RX ADMIN — PREGABALIN 150 MG: 75 CAPSULE ORAL at 08:28

## 2018-05-01 RX ADMIN — OXYCODONE HYDROCHLORIDE 10 MG: 10 TABLET ORAL at 14:16

## 2018-05-01 RX ADMIN — LEVETIRACETAM 500 MG: 500 TABLET, FILM COATED ORAL at 20:13

## 2018-05-01 RX ADMIN — LEVETIRACETAM 500 MG: 500 TABLET, FILM COATED ORAL at 08:28

## 2018-05-01 RX ADMIN — MIDODRINE HYDROCHLORIDE 10 MG: 5 TABLET ORAL at 08:27

## 2018-05-01 RX ADMIN — MIDODRINE HYDROCHLORIDE 10 MG: 5 TABLET ORAL at 18:21

## 2018-05-01 RX ADMIN — HYDROCORTISONE 20 MG: 20 TABLET ORAL at 08:28

## 2018-05-01 RX ADMIN — ASPIRIN 81 MG: 81 TABLET, CHEWABLE ORAL at 08:27

## 2018-05-01 RX ADMIN — SODIUM CHLORIDE: 9 INJECTION, SOLUTION INTRAVENOUS at 16:20

## 2018-05-01 RX ADMIN — HYDROCODONE BITARTRATE AND ACETAMINOPHEN 1 TABLET: 5; 325 TABLET ORAL at 15:29

## 2018-05-01 ASSESSMENT — ENCOUNTER SYMPTOMS
SHORTNESS OF BREATH: 0
NAUSEA: 0
DIZZINESS: 0
HEARTBURN: 0
VOMITING: 0
HEADACHES: 1
FEVER: 0
COUGH: 0
BLURRED VISION: 0
CHILLS: 0
DEPRESSION: 0
CONSTIPATION: 0
DIARRHEA: 0
SPEECH CHANGE: 0
ABDOMINAL PAIN: 0
WEAKNESS: 1
MYALGIAS: 0
SENSORY CHANGE: 0
FOCAL WEAKNESS: 0

## 2018-05-01 ASSESSMENT — PAIN SCALES - GENERAL
PAINLEVEL_OUTOF10: 5
PAINLEVEL_OUTOF10: 10
PAINLEVEL_OUTOF10: 7
PAINLEVEL_OUTOF10: 5
PAINLEVEL_OUTOF10: 5
PAINLEVEL_OUTOF10: 3
PAINLEVEL_OUTOF10: 0
PAINLEVEL_OUTOF10: 10
PAINLEVEL_OUTOF10: 10
PAINLEVEL_OUTOF10: 6
PAINLEVEL_OUTOF10: 10

## 2018-05-01 ASSESSMENT — COGNITIVE AND FUNCTIONAL STATUS - GENERAL
STANDING UP FROM CHAIR USING ARMS: A LITTLE
EATING MEALS: A LITTLE
WALKING IN HOSPITAL ROOM: A LITTLE
CLIMB 3 TO 5 STEPS WITH RAILING: A LITTLE
SUGGESTED CMS G CODE MODIFIER MOBILITY: CJ
DRESSING REGULAR UPPER BODY CLOTHING: A LITTLE
DRESSING REGULAR LOWER BODY CLOTHING: A LITTLE
DAILY ACTIVITIY SCORE: 18
TOILETING: A LITTLE
MOBILITY SCORE: 21
PERSONAL GROOMING: A LITTLE
HELP NEEDED FOR BATHING: A LITTLE
SUGGESTED CMS G CODE MODIFIER DAILY ACTIVITY: CK

## 2018-05-01 ASSESSMENT — GAIT ASSESSMENTS: GAIT LEVEL OF ASSIST: UNABLE TO PARTICIPATE

## 2018-05-01 NOTE — THERAPY
"Occupational Therapy Treatment completed with focus on ADLs and ADL transfers.  Functional Status:  Max v/c's to attend ADL tasks, CGA functional mobility, CGA grooming, poor activity tolerance  Plan of Care: Will benefit from Occupational Therapy 3 times per week  Discharge Recommendations:  Equipment Will Continue to Assess for Equipment Needs. Post-acute therapy Discharge to a transitional care facility for continued skilled therapy services.    See \"Rehab Therapy-Acute\" Patient Summary Report for complete documentation.     OT re-evaluation orders received. Pt's POC and goals did not change from prior status. Pt mobilized to sink to brushed teeth, no AD used, pt. Stood at sink for less than 20 seconds before stating she was too fatigued and returning to bed. Encouraged pt to participate in grooming ADL while seated EOB. Pt. Continues to be limited by poor activity tolerance, generalized strength, decreased balance, decreased attention and initiation impacting ability to participate in BADL tasks. At this time it would be unsafe to DC pt home. Recommend DC to transitional care facility for therapy 5x/wk.     "

## 2018-05-01 NOTE — CARE PLAN
Problem: Safety  Goal: Will remain free from falls    Intervention: Implement fall precautions  Fall precautions in place - bed in lowest position, bed alarm is on, call light and personal belongings within reach.       Problem: Pain Management  Goal: Pain level will decrease to patient's comfort goal  Medicating pt per MAR for pain

## 2018-05-01 NOTE — CARE PLAN
Problem: Safety  Goal: Will remain free from falls  Outcome: PROGRESSING AS EXPECTED      Problem: Bowel/Gastric:  Goal: Will not experience complications related to bowel motility  Outcome: PROGRESSING AS EXPECTED      Problem: Pain Management  Goal: Pain level will decrease to patient's comfort goal  Outcome: PROGRESSING AS EXPECTED      Problem: Skin Integrity  Goal: Risk for impaired skin integrity will decrease  Outcome: PROGRESSING AS EXPECTED

## 2018-05-01 NOTE — THERAPY
"Physical Therapy Treatment completed.   Bed Mobility:  Supine to Sit: Supervised  Transfers: Sit to Stand: Stand by Assist  Gait: Level Of Assist: Unable to Participate      Plan of Care: Will benefit from Physical Therapy 3 times per week  Discharge Recommendations: Equipment: Will Continue to Assess for Equipment Needs.     Pt unable to participate in ambulation or exercises for therapy session 2/2 low BP with standing; Pt reporting increased dizziness with standing with BP measured at 58/42 after 5 sit<>stands performed; Pt is most limited by low BP with activity but ther are no noted functional deficits with bed mobility or sit<>stand; In current condition pt would benefit from SNF for increased activity tolerance; will continue to follow to progress activities and assess d/c needs as BP becomes more consistent and under control    See \"Rehab Therapy-Acute\" Patient Summary Report for complete documentation.       "

## 2018-05-01 NOTE — PROGRESS NOTES
Pt in bed resting, reassessed headache states 0/10 pain. Call light within reach, bed alarm on and bed in lowest position. Will continue hourly rounding.

## 2018-05-01 NOTE — PROGRESS NOTES
[facility] Hospitalist Progress Note    Date of Service: 2018    Chief Complaint  53 y.o. female admitted 4/10/2018 with ams.  Found to have multiple acute CVAs on MRI.  New onset seizure disorder.    Interval Problem Update  No further seizure activity noted.  Complaining of severe headache, not responding to medications.  CT pending.  Generally weak.  Denies numbness or tingling to extremities.  Discussed placement of loop recorder prior to D/C.  Patient understanding and willing to go forward.    Consultants/Specialty  Neurology    Disposition  Plan for snf at d/c.  SW following.        Review of Systems   Constitutional: Positive for malaise/fatigue. Negative for chills and fever.   HENT: Negative for congestion.    Eyes: Negative for blurred vision.   Respiratory: Negative for cough and shortness of breath.    Cardiovascular: Negative for chest pain and leg swelling.   Gastrointestinal: Negative for abdominal pain, constipation, diarrhea, heartburn, nausea and vomiting.   Genitourinary: Negative for dysuria and urgency.   Musculoskeletal: Negative for myalgias.   Skin: Negative for itching and rash.   Neurological: Positive for weakness and headaches. Negative for dizziness, sensory change, speech change and focal weakness.   Psychiatric/Behavioral: Negative for depression.      Physical Exam  Laboratory/Imaging   Hemodynamics  Temp (24hrs), Av.9 °C (98.5 °F), Min:36.7 °C (98.1 °F), Max:37.1 °C (98.8 °F)   Temperature: (P) 37.1 °C (98.7 °F)  Pulse  Av.7  Min: 12  Max: 157    Blood Pressure: (P) 150/78     Respiratory      Respiration: (P) 14, Pulse Oximetry: (P) 98 %     Work Of Breathing / Effort: (P) Mild  RUL Breath Sounds: (P) Clear, RML Breath Sounds: (P) Clear, RLL Breath Sounds: (P) Diminished, JOLYNN Breath Sounds: (P) Clear, LLL Breath Sounds: (P) Clear    Fluids    Intake/Output Summary (Last 24 hours) at 18 1541  Last data filed at 18   Gross per 24 hour   Intake               100 ml   Output                0 ml   Net              100 ml       Nutrition  Orders Placed This Encounter   Procedures   • DIET ORDER     Standing Status:   Standing     Number of Occurrences:   1     Order Specific Question:   Diet:     Answer:   Regular [1]     Order Specific Question:   Texture/Fiber modifications:     Answer:   Dysphagia 2(Pureed/Chopped)specify fluid consistency(question 6) [2]     Order Specific Question:   Consistency/Fluid modifications:     Answer:   Thin Liquids [3]     Physical Exam   Constitutional: She is oriented to person, place, and time. She appears well-developed. No distress.   Thin white female   HENT:   Head: Normocephalic and atraumatic.   Mouth/Throat: No oropharyngeal exudate.   Eyes: Conjunctivae are normal. Pupils are equal, round, and reactive to light. Right eye exhibits no discharge. Left eye exhibits no discharge.   Neck: Neck supple. No tracheal deviation present.   Cardiovascular: Normal rate, regular rhythm, normal heart sounds and intact distal pulses.    No murmur heard.  Pulmonary/Chest: Effort normal and breath sounds normal. No stridor. No respiratory distress. She has no wheezes. She has no rales.   Abdominal: Soft. Bowel sounds are normal. She exhibits no distension. There is no tenderness. There is no rebound.   Musculoskeletal: Normal range of motion. She exhibits no edema or deformity.   Neurological: She is alert and oriented to person, place, and time. She exhibits normal muscle tone. Coordination normal.   Skin: Skin is warm and dry. No rash noted. She is not diaphoretic. No erythema.                                Assessment/Plan     * Stroke syndrome (HCC)   Assessment & Plan    Multifocal ischemic infarcts  Neurology following    Carotid US normal.  On aspirin  LACI w bubble study showed PFO.  Patient wishing to treat medically at this time.  Anticipate loop recorder placement prior to d/c to r/o embolic origin.  She passed her swallow eval and has  been tolerating a diet.  Anticipate need for SNF at d/c.    MRI Brain:  1.  Multifocal areas of acute infarction with the largest area of infarction located superiorly in the left frontal lobe extending to the cortex. Much smaller areas of acute infarction are noted in the left posterior occipital lobe, right   parietal-occipital cortex, and right hemicerebellum.    2.  Mild periventricular and juxtacortical white matter changes consistent with chronic microvascular ischemic gliosis            Acute respiratory failure with hypoxia (HCC)- (present on admission)   Assessment & Plan    Likely secondary to seizure.  Requiring intubation on 4/26 and extubation on 4/27.  Pulmonology s/o  No post extubation complications        Hypotension- (present on admission)   Assessment & Plan    Continues to have episodes of hypotension.  She had been on hydrocortisone IV now transitioned to oral with a taper.  s/p IV pressors and IV fluids.  Scheduled midodrine increased.        Seizure disorder as sequela of cerebrovascular accident (HCC)- (present on admission)   Assessment & Plan    Neurology following.  Continuous EEG 4/26 consistent with non convulsive seizures.  No further seizure activity with increased dose of keppra.          Drug overdose - unintentional- (present on admission)   Assessment & Plan    Buprenorphine patch removed PTA.   Patient was on high doses of narcotics at home for chronic pain and fibromyalgia  Conservative use of narcotics.                 Weakness   Assessment & Plan    Secondary to above.  PT/OT following.   Anticipate SNF at d/c.        Normocytic anemia- (present on admission)   Assessment & Plan    Stable.        Chronic pain syndrome- (present on admission)   Assessment & Plan    Continue Norco  Conservative use of narcotics due to possible unintentional narcotics overdose.          Campylobacter diarrhea- (present on admission)   Assessment & Plan    Campylobacter on cultures from  transferring facility. C-diff negative here on 4/10/18.            Hyperglycemia   Assessment & Plan    A1C normal at 5.4. No hx of Dm.  This likely steroid induced.   Monitor blood glucoses.            Leukocytosis- (present on admission)   Assessment & Plan    WBC has remained elevated with elevated polys and low lymphs    Completed Unasyn and Azithro 4/16.  Procalcitonin was normal.  Clinically with no signs with active infection.   On Cortef which might contribute to leucocytosis.           Elevated brain natriuretic peptide (BNP) level   Assessment & Plan    BNP 8000s on arrival.   TTE showed normal diastolic function & EF 70%.  .            Elevated troponin- (present on admission)   Assessment & Plan    No CP or hx of CAD.  Normal echocardiogram.  Repeat trended down.         Autonomic neuropathy- (present on admission)   Assessment & Plan    On midodrine and tapering hydrocortisone          Anorexia- (present on admission)   Assessment & Plan    Encourage oral intake.  Appropriate caloric intake discussed with patient and family.  She has required a PEG in the past.  Dietician following.        Aspiration pneumonia (HCC)- (present on admission)   Assessment & Plan    Completed a course of Unasyn.  CT chest normal  She was hypotensive requiring fluids and steroids  Hydrocortisone will be tapered.            Quality-Core Measures   Reviewed items::  Labs reviewed and Medications reviewed  Bishop catheter::  No Bishop  DVT prophylaxis pharmacological::  Enoxaparin (Lovenox)  Assessed for rehabilitation services:  Patient was assess for and/or received rehabilitation services during this hospitalization

## 2018-05-01 NOTE — PROGRESS NOTES
Pt A&Ox3, disoriented to place. No c/o N/V, N/T. Pt c/o generalized pain, medicated per MAR. Pt ambulates x1 assist hand held to restroom, c/o dizziness when ambulating. Pt on 3L oxygen via nasal cannula,  in place, pt desats to 70's on room air. Pt educated on need to keep nasal cannula on. Bed alarm on, call light and personal belongings within reach, hourly rounding in place.

## 2018-05-01 NOTE — CARE PLAN
Problem: Safety  Goal: Will remain free from falls  Outcome: PROGRESSING AS EXPECTED      Problem: Pain Management  Goal: Pain level will decrease to patient's comfort goal  Outcome: PROGRESSING AS EXPECTED      Problem: Skin Integrity  Goal: Risk for impaired skin integrity will decrease  Outcome: PROGRESSING AS EXPECTED

## 2018-05-01 NOTE — PROCEDURES
DATE OF SERVICE:  04/26/2018    This is inpatient video EEG from 04/26/2018-04/27/2018    2 DAYS OF INTENSIVE VIDEO ELECTROENCEPHALOGRAM REPORT        This Video EEG was done from 4/26/2018 to 4/27/2018        INDICATION: Seizure        TECHNIQUE:  Routine electroencephalogram (EEG) was performed in accordance with the international 10-20 system. The study was reviewed in bipolar and referential montages. The recording examined the patient during wakeful and drowsy state(s).      DESCRIPTION OF THE RECORD:        On 4/26/2018 There are nearly frequent long bursts of delta sharp with more focus from right hemisphere. Non convulsive seizure are likely with focus probably from right frontal region  The left limb tonic posturing are part of seizure manifestations     On 4/27/2018 Non convulsive seizure subsided. Focus probably from right frontal region  The left limb tonic posturing are part of seizure manifestations.  No spike-and-wave discharges         ACTIVATION PROCEDURES:          ICTAL AND/OR INTERICTAL FINDINGS:          EKG: sampling of the EKG recording demonstrated sinus rhythm.          INTERPRETATION:           ________________________________________________________________________     This scalp EEG from 4/26/2018 to 4/27/2018 is consistent with                  1. Probable non-convulsive seizure in the beginning and followed by normalization of EEG  The patient also woke up despite intubation              ________________________________________________________________________           ________________________________________________________________________        Billing documentation reflecting total daily recordings:   4/26/2018 (16 hrs and 29 mins)  4/27/2018 (4 hrs)     ________________________________________________________________________              EEG monitoring, improved since yesterday, no more epileptiform sharp  The patient woke up before last night     Will stop video  EEG                      ____________________________________     MD JUDD CASTRO    DD:  04/30/2018 23:28:17  DT:  05/01/2018 00:04:10    D#:  [id number]  Job#:  [id number]

## 2018-05-01 NOTE — PROGRESS NOTES
Pt woke up, A&O x4. No c/o N/V. Headache 7/10 on 0-10 pain scale, medicate per MAR. C/O of dizziness when ambulating to bathroom, ambulates 1 person assist. Pt on , 96% on 3L via n.c., BBS= clear throughout. Call light with in reach, bed in lowest position and bed alarm in place. Will continue to hourly rounding.

## 2018-05-01 NOTE — EEG PROGRESS NOTE
2 DAYS OF INTENSIVE VIDEO ELECTROENCEPHALOGRAM REPORT      This Video EEG was done from 4/26/2018 to 4/27/2018      INDICATION: Seizure      TECHNIQUE:  Routine electroencephalogram (EEG) was performed in accordance with the international 10-20 system. The study was reviewed in bipolar and referential montages. The recording examined the patient during wakeful and drowsy state(s).     DESCRIPTION OF THE RECORD:      On 4/26/2018 There are nearly frequent long bursts of delta sharp with more focus from right hemisphere. Non convulsive seizure are likely with focus probably from right frontal region  The left limb tonic posturing are part of seizure manifestations     On 4/27/2018 Non convulsive seizure subsided. Focus probably from right frontal region  The left limb tonic posturing are part of seizure manifestations.  No spike-and-wave discharges       ACTIVATION PROCEDURES:        ICTAL AND/OR INTERICTAL FINDINGS:         EKG: sampling of the EKG recording demonstrated sinus rhythm.        INTERPRETATION:        ________________________________________________________________________    This scalp EEG from 4/26/2018 to 4/27/2018 is consistent with      1. Probable non-convulsive seizure in the beginning and followed by normalization of EEG  The patient also woke up despite intubation          ________________________________________________________________________        ________________________________________________________________________      Billing documentation reflecting total daily recordings:   4/26/2018 (16 hrs and 29 mins)  4/27/2018 (4 hrs)    ________________________________________________________________________             EEG monitoring, improved since yesterday, no more epileptiform sharp  The patient woke up before last night     Will stop video EEG

## 2018-05-02 LAB
ANION GAP SERPL CALC-SCNC: 7 MMOL/L (ref 0–11.9)
BUN SERPL-MCNC: 21 MG/DL (ref 8–22)
CALCIUM SERPL-MCNC: 9 MG/DL (ref 8.5–10.5)
CHLORIDE SERPL-SCNC: 102 MMOL/L (ref 96–112)
CO2 SERPL-SCNC: 29 MMOL/L (ref 20–33)
CORTIS SERPL-MCNC: 27.4 UG/DL (ref 0–23)
CREAT SERPL-MCNC: 0.64 MG/DL (ref 0.5–1.4)
EKG IMPRESSION: NORMAL
GLUCOSE BLD-MCNC: 97 MG/DL (ref 65–99)
GLUCOSE SERPL-MCNC: 92 MG/DL (ref 65–99)
POTASSIUM SERPL-SCNC: 4.2 MMOL/L (ref 3.6–5.5)
SODIUM SERPL-SCNC: 138 MMOL/L (ref 135–145)

## 2018-05-02 PROCEDURE — 80048 BASIC METABOLIC PNL TOTAL CA: CPT

## 2018-05-02 PROCEDURE — G8997 SWALLOW GOAL STATUS: HCPCS | Mod: CH

## 2018-05-02 PROCEDURE — 700102 HCHG RX REV CODE 250 W/ 637 OVERRIDE(OP): Performed by: NURSE PRACTITIONER

## 2018-05-02 PROCEDURE — 700102 HCHG RX REV CODE 250 W/ 637 OVERRIDE(OP): Performed by: HOSPITALIST

## 2018-05-02 PROCEDURE — 93010 ELECTROCARDIOGRAM REPORT: CPT | Performed by: INTERNAL MEDICINE

## 2018-05-02 PROCEDURE — 700102 HCHG RX REV CODE 250 W/ 637 OVERRIDE(OP): Performed by: INTERNAL MEDICINE

## 2018-05-02 PROCEDURE — 93005 ELECTROCARDIOGRAM TRACING: CPT | Performed by: NURSE PRACTITIONER

## 2018-05-02 PROCEDURE — 36415 COLL VENOUS BLD VENIPUNCTURE: CPT

## 2018-05-02 PROCEDURE — 92526 ORAL FUNCTION THERAPY: CPT

## 2018-05-02 PROCEDURE — A9270 NON-COVERED ITEM OR SERVICE: HCPCS | Performed by: INTERNAL MEDICINE

## 2018-05-02 PROCEDURE — 700111 HCHG RX REV CODE 636 W/ 250 OVERRIDE (IP): Performed by: HOSPITALIST

## 2018-05-02 PROCEDURE — A9270 NON-COVERED ITEM OR SERVICE: HCPCS | Performed by: HOSPITALIST

## 2018-05-02 PROCEDURE — G8996 SWALLOW CURRENT STATUS: HCPCS | Mod: CI

## 2018-05-02 PROCEDURE — A9270 NON-COVERED ITEM OR SERVICE: HCPCS | Performed by: NURSE PRACTITIONER

## 2018-05-02 PROCEDURE — 82533 TOTAL CORTISOL: CPT

## 2018-05-02 PROCEDURE — 82962 GLUCOSE BLOOD TEST: CPT

## 2018-05-02 PROCEDURE — 770006 HCHG ROOM/CARE - MED/SURG/GYN SEMI*

## 2018-05-02 PROCEDURE — 99233 SBSQ HOSP IP/OBS HIGH 50: CPT | Performed by: HOSPITALIST

## 2018-05-02 RX ORDER — HYDROCORTISONE 20 MG/1
20 TABLET ORAL 3 TIMES DAILY
Status: DISCONTINUED | OUTPATIENT
Start: 2018-05-02 | End: 2018-05-04

## 2018-05-02 RX ORDER — MIDODRINE HYDROCHLORIDE 5 MG/1
10 TABLET ORAL 4 TIMES DAILY
Status: DISCONTINUED | OUTPATIENT
Start: 2018-05-02 | End: 2018-05-18 | Stop reason: HOSPADM

## 2018-05-02 RX ADMIN — ASPIRIN 81 MG: 81 TABLET, CHEWABLE ORAL at 10:04

## 2018-05-02 RX ADMIN — MIDODRINE HYDROCHLORIDE 10 MG: 5 TABLET ORAL at 12:43

## 2018-05-02 RX ADMIN — LEVETIRACETAM 500 MG: 500 TABLET, FILM COATED ORAL at 10:04

## 2018-05-02 RX ADMIN — HYDROCODONE BITARTRATE AND ACETAMINOPHEN 1 TABLET: 5; 325 TABLET ORAL at 10:04

## 2018-05-02 RX ADMIN — HYDROCORTISONE 20 MG: 20 TABLET ORAL at 10:05

## 2018-05-02 RX ADMIN — MIDODRINE HYDROCHLORIDE 10 MG: 5 TABLET ORAL at 16:04

## 2018-05-02 RX ADMIN — PREGABALIN 150 MG: 75 CAPSULE ORAL at 20:08

## 2018-05-02 RX ADMIN — ACETAMINOPHEN 650 MG: 325 TABLET, FILM COATED ORAL at 16:05

## 2018-05-02 RX ADMIN — HYDROCORTISONE 20 MG: 20 TABLET ORAL at 20:10

## 2018-05-02 RX ADMIN — MIDODRINE HYDROCHLORIDE 10 MG: 5 TABLET ORAL at 10:04

## 2018-05-02 RX ADMIN — HYDROCORTISONE 20 MG: 20 TABLET ORAL at 16:04

## 2018-05-02 RX ADMIN — PREGABALIN 150 MG: 75 CAPSULE ORAL at 10:04

## 2018-05-02 RX ADMIN — LEVETIRACETAM 500 MG: 500 TABLET, FILM COATED ORAL at 20:08

## 2018-05-02 RX ADMIN — OXYCODONE HYDROCHLORIDE 10 MG: 10 TABLET ORAL at 04:53

## 2018-05-02 RX ADMIN — OXYCODONE HYDROCHLORIDE 5 MG: 5 TABLET ORAL at 21:17

## 2018-05-02 RX ADMIN — ENOXAPARIN SODIUM 40 MG: 100 INJECTION SUBCUTANEOUS at 10:04

## 2018-05-02 RX ADMIN — MIDODRINE HYDROCHLORIDE 10 MG: 5 TABLET ORAL at 20:08

## 2018-05-02 ASSESSMENT — ENCOUNTER SYMPTOMS
ABDOMINAL PAIN: 0
BLURRED VISION: 0
DEPRESSION: 0
DOUBLE VISION: 0
TINGLING: 0
WEIGHT LOSS: 1
HEADACHES: 0
SHORTNESS OF BREATH: 0
DIARRHEA: 0
BACK PAIN: 0
SPEECH CHANGE: 0
FEVER: 0
DIZZINESS: 0
TREMORS: 0
FOCAL WEAKNESS: 0
MYALGIAS: 0
WEAKNESS: 1
SENSORY CHANGE: 0
HEARTBURN: 0
VOMITING: 0
NAUSEA: 0
CHILLS: 0
SORE THROAT: 0

## 2018-05-02 ASSESSMENT — PAIN SCALES - GENERAL
PAINLEVEL_OUTOF10: 7
PAINLEVEL_OUTOF10: 8
PAINLEVEL_OUTOF10: 6
PAINLEVEL_OUTOF10: 6
PAINLEVEL_OUTOF10: 8
PAINLEVEL_OUTOF10: 6
PAINLEVEL_OUTOF10: 8
PAINLEVEL_OUTOF10: 8

## 2018-05-02 NOTE — PROGRESS NOTES
[facility] Hospitalist Progress Note    Date of Service: 2018    Chief Complaint  53 y.o. female admitted 4/10/2018 with ams.  Found to have multiple acute CVAs on MRI.  New onset seizure disorder.    Interval Problem Update  Code blue called overnight due to patient becoming unresponsive while in the bathroom.  She has recovered spontaneously from this episode.  This is likely associated with her episodes of hypotension.  Her midodrine and hydrocortisone doses have been increased.  She is stable at this time.  Denies pain, lightheadedness, nausea, or vomiting.      Consultants/Specialty  Neurology    Disposition  Plan for snf at d/c.   following.        Review of Systems   Constitutional: Positive for malaise/fatigue and weight loss. Negative for chills and fever.   HENT: Negative for congestion and sore throat.    Eyes: Negative for blurred vision and double vision.   Respiratory: Negative for shortness of breath.    Cardiovascular: Negative for chest pain and leg swelling.   Gastrointestinal: Negative for abdominal pain, diarrhea, heartburn, nausea and vomiting.   Genitourinary: Negative for dysuria.   Musculoskeletal: Negative for back pain and myalgias.   Skin: Negative for rash.   Neurological: Positive for weakness. Negative for dizziness, tingling, tremors, sensory change, speech change, focal weakness and headaches.   Psychiatric/Behavioral: Negative for depression.      Physical Exam  Laboratory/Imaging   Hemodynamics  Temp (24hrs), Av.8 °C (98.2 °F), Min:36.6 °C (97.9 °F), Max:37.1 °C (98.7 °F)   Temperature: 36.6 °C (97.9 °F)  Pulse  Av.6  Min: 12  Max: 157    Blood Pressure: 101/55     Respiratory      Respiration: 16, Pulse Oximetry: 100 %     Work Of Breathing / Effort: Mild  RUL Breath Sounds: Clear, RML Breath Sounds: Clear, RLL Breath Sounds: Diminished, JOLYNN Breath Sounds: Clear, LLL Breath Sounds: Diminished    Fluids    Intake/Output Summary (Last 24 hours) at 18 0541  Last data  filed at 05/02/18 0900   Gross per 24 hour   Intake              890 ml   Output                0 ml   Net              890 ml       Nutrition  Orders Placed This Encounter   Procedures   • DIET ORDER     Standing Status:   Standing     Number of Occurrences:   1     Order Specific Question:   Diet:     Answer:   Regular [1]     Order Specific Question:   Texture/Fiber modifications:     Answer:   Dysphagia 2(Pureed/Chopped)specify fluid consistency(question 6) [2]     Order Specific Question:   Consistency/Fluid modifications:     Answer:   Thin Liquids [3]     Physical Exam   Constitutional: She is oriented to person, place, and time. She appears well-developed.   Thin white female   HENT:   Head: Normocephalic and atraumatic.   Eyes: Conjunctivae are normal. Pupils are equal, round, and reactive to light. No scleral icterus.   Neck: Neck supple. No JVD present.   Cardiovascular: Normal rate, regular rhythm, normal heart sounds and intact distal pulses.  Exam reveals no friction rub.    No murmur heard.  Pulmonary/Chest: Effort normal and breath sounds normal. No stridor. No respiratory distress. She has no wheezes.   Abdominal: Soft. Bowel sounds are normal. She exhibits no distension. There is no tenderness.   Musculoskeletal: Normal range of motion. She exhibits no edema.   Neurological: She is alert and oriented to person, place, and time. Coordination normal.   Skin: Skin is warm and dry. She is not diaphoretic. No erythema.           Recent Labs      05/02/18   0236   SODIUM  138   POTASSIUM  4.2   CHLORIDE  102   CO2  29   GLUCOSE  92   BUN  21   CREATININE  0.64   CALCIUM  9.0                      Assessment/Plan     * Stroke syndrome (HCC)   Assessment & Plan    Multifocal ischemic infarcts  Neurology following    Carotid US normal.  On aspirin  LACI w bubble study showed PFO.  Patient wishing to treat medically at this time.  Anticipate loop recorder placement prior to d/c to r/o embolic origin.  She  passed her swallow eval and has been tolerating a diet.  Anticipate need for SNF at d/c.    MRI Brain:  1.  Multifocal areas of acute infarction with the largest area of infarction located superiorly in the left frontal lobe extending to the cortex. Much smaller areas of acute infarction are noted in the left posterior occipital lobe, right   parietal-occipital cortex, and right hemicerebellum.    2.  Mild periventricular and juxtacortical white matter changes consistent with chronic microvascular ischemic gliosis            Acute respiratory failure with hypoxia (HCC)- (present on admission)   Assessment & Plan    Likely secondary to seizure.  Requiring intubation on 4/26 and extubation on 4/27.  Pulmonology s/o  No post extubation complications        Hypotension- (present on admission)   Assessment & Plan    Continues to have episodes of hypotension.  She had been on hydrocortisone IV now transitioned to oral.  According to med rec patient was prescribed 50 mg of hydrocortisone TID.  Will increase frequency of hydrocortisone and monitor.           Seizure disorder as sequela of cerebrovascular accident (HCC)- (present on admission)   Assessment & Plan    Neurology following.  Continuous EEG 4/26 consistent with non convulsive seizures.  No further seizure activity with increased dose of keppra.  Episode of becoming unresponsive while going to the bathroom.  No evidence of seizure activity reported.  ? Seizure vs symptomatic hypotension.          Drug overdose - unintentional- (present on admission)   Assessment & Plan    Buprenorphine patch removed PTA.   Patient was on high doses of narcotics at home for chronic pain and fibromyalgia  Conservative use of narcotics.                 Weakness   Assessment & Plan    Secondary to above.  PT/OT following.   Anticipate SNF at d/c.        Normocytic anemia- (present on admission)   Assessment & Plan    Stable.        Chronic pain syndrome- (present on admission)    Assessment & Plan    Continue Norco  Conservative use of narcotics due to possible unintentional narcotics overdose.          Campylobacter diarrhea- (present on admission)   Assessment & Plan    Campylobacter on cultures from transferring facility. C-diff negative here on 4/10/18.            Hyperglycemia   Assessment & Plan    A1C normal at 5.4. No hx of Dm.  This likely steroid induced.   Monitor blood glucoses.            Leukocytosis- (present on admission)   Assessment & Plan    WBC has remained elevated with elevated polys and low lymphs    Completed Unasyn and Azithro 4/16.  Procalcitonin was normal.  Clinically with no signs with active infection.   On Cortef which might contribute to leucocytosis.           Elevated brain natriuretic peptide (BNP) level   Assessment & Plan    BNP 8000s on arrival.   TTE showed normal diastolic function & EF 70%.  .            Elevated troponin- (present on admission)   Assessment & Plan    No CP or hx of CAD.  Normal echocardiogram.  Repeat trended down.         Autonomic neuropathy- (present on admission)   Assessment & Plan    On midodrine and hydrocortisone          Anorexia- (present on admission)   Assessment & Plan    Encourage oral intake.  Appropriate caloric intake discussed with patient and family.  She has required a PEG in the past.  Dietician following.        Aspiration pneumonia (HCC)- (present on admission)   Assessment & Plan    Completed a course of Unasyn.  CT chest normal                Quality-Core Measures   Reviewed items::  Labs reviewed, Medications reviewed and Radiology images reviewed  Bishop catheter::  No Bishop  DVT prophylaxis pharmacological::  Enoxaparin (Lovenox)  Assessed for rehabilitation services:  Patient was assess for and/or received rehabilitation services during this hospitalization

## 2018-05-02 NOTE — PROGRESS NOTES
Pt alert and oriented x4. Fatigued following code blue. Will administer morning  medications when she is more awake. Ns running at 75cc/hr. Bed alarm on ,call light w/in reach, hourly rounding in place.

## 2018-05-02 NOTE — PROGRESS NOTES
Patient seen Aox4 and resting comfortably in bed.  Plan of care updated.  Patient denying pain and numbness/tingling at this time.  VALENCIA without difficulty. Patient up to bathroom this evening with one person assist. Tolerated ambulation well.    in place.   All needs addressed at this time.  Call light and personal belongings within reach, bed locked and in lowest position, room near nurses station and hourly rounding in place.

## 2018-05-02 NOTE — THERAPY
"Speech Language Therapy dysphagia treatment completed.   Functional Status:  Tolerating dysphagia 2/thins diet. Functional swallow for diet advancement.    Recommendations: Recommend advance diet to dysphagia 3 and thin liquids. Hold with any difficulty.    Plan of Care: Will benefit from Speech Therapy 5 times per week  Post-Acute Therapy: Discharge to a transitional care facility for continued skilled therapy services.    See \"Rehab Therapy-Acute\" Patient Summary Report for complete documentation.     "

## 2018-05-02 NOTE — CARE PLAN
Problem: Pain Management  Goal: Pain level will decrease to patient's comfort goal  Outcome: PROGRESSING AS EXPECTED  Patient has no complaints of pain at this time. Scheduled pain medication administered per MAR.     Problem: Mobility  Goal: Risk for activity intolerance will decrease  Outcome: PROGRESSING AS EXPECTED  Patient encouraged to ambulate. Patient ambulatory with 1 person assist due to orthostatic hypotension.

## 2018-05-02 NOTE — CARE PLAN
Problem: Respiratory:  Goal: Respiratory status will improve    Intervention: Administer and titrate oxygen therapy  Currently on 3L O2 via NC.      Problem: Mobility  Goal: Risk for activity intolerance will decrease    Intervention: Assess and monitor signs of activity intolerance  Allowing pt bedrest following code blue d/t orthostatic hypotension.

## 2018-05-02 NOTE — PROGRESS NOTES
RN answered patient's call light. Patient requesting to get up to the bathroom to urinate.  RN got patient up and walked her to bathroom. On the way to the bathroom RN noticed patients legs becoming weak. RN sat patient up on nearby commode. Staff assist button pressed.   Joi SALINAS in bathroom to assist RN.  Patient began voiding on bathroom floor.   When RN looked up patient appeared blue in the face and was noted to not be breathing. Pulse was checked. Pulse noted.   Patient placed on floor by RN and CNA in case of code. Code blue called.   As patient was placed on the floor patient took a deep breath. Vitals taken while patient on floor.   Patient carried back to bed. Vitals rechecked.   BP: 108/71  HR: 80  O2: 100% on 5L  Respirations: 18  Patient now resting comfortably in bed.   Tristan RIOS updated regarding event. No new orders received.

## 2018-05-03 LAB
ANION GAP SERPL CALC-SCNC: 8 MMOL/L (ref 0–11.9)
BUN SERPL-MCNC: 21 MG/DL (ref 8–22)
CALCIUM SERPL-MCNC: 8.7 MG/DL (ref 8.5–10.5)
CHLORIDE SERPL-SCNC: 102 MMOL/L (ref 96–112)
CO2 SERPL-SCNC: 28 MMOL/L (ref 20–33)
CREAT SERPL-MCNC: 0.58 MG/DL (ref 0.5–1.4)
ERYTHROCYTE [DISTWIDTH] IN BLOOD BY AUTOMATED COUNT: 43.3 FL (ref 35.9–50)
GLUCOSE SERPL-MCNC: 107 MG/DL (ref 65–99)
HCT VFR BLD AUTO: 43 % (ref 37–47)
HGB BLD-MCNC: 14.1 G/DL (ref 12–16)
MCH RBC QN AUTO: 29.6 PG (ref 27–33)
MCHC RBC AUTO-ENTMCNC: 32.8 G/DL (ref 33.6–35)
MCV RBC AUTO: 90.1 FL (ref 81.4–97.8)
PLATELET # BLD AUTO: 239 K/UL (ref 164–446)
PMV BLD AUTO: 10.1 FL (ref 9–12.9)
POTASSIUM SERPL-SCNC: 4.3 MMOL/L (ref 3.6–5.5)
RBC # BLD AUTO: 4.77 M/UL (ref 4.2–5.4)
SODIUM SERPL-SCNC: 138 MMOL/L (ref 135–145)
WBC # BLD AUTO: 19.2 K/UL (ref 4.8–10.8)

## 2018-05-03 PROCEDURE — 97116 GAIT TRAINING THERAPY: CPT

## 2018-05-03 PROCEDURE — 99232 SBSQ HOSP IP/OBS MODERATE 35: CPT | Performed by: HOSPITALIST

## 2018-05-03 PROCEDURE — 85027 COMPLETE CBC AUTOMATED: CPT

## 2018-05-03 PROCEDURE — 80048 BASIC METABOLIC PNL TOTAL CA: CPT

## 2018-05-03 PROCEDURE — 700102 HCHG RX REV CODE 250 W/ 637 OVERRIDE(OP): Performed by: NURSE PRACTITIONER

## 2018-05-03 PROCEDURE — 770006 HCHG ROOM/CARE - MED/SURG/GYN SEMI*

## 2018-05-03 PROCEDURE — 97535 SELF CARE MNGMENT TRAINING: CPT

## 2018-05-03 PROCEDURE — 700102 HCHG RX REV CODE 250 W/ 637 OVERRIDE(OP): Performed by: HOSPITALIST

## 2018-05-03 PROCEDURE — A9270 NON-COVERED ITEM OR SERVICE: HCPCS | Performed by: INTERNAL MEDICINE

## 2018-05-03 PROCEDURE — 92507 TX SP LANG VOICE COMM INDIV: CPT

## 2018-05-03 PROCEDURE — 36415 COLL VENOUS BLD VENIPUNCTURE: CPT

## 2018-05-03 PROCEDURE — 700111 HCHG RX REV CODE 636 W/ 250 OVERRIDE (IP): Performed by: HOSPITALIST

## 2018-05-03 PROCEDURE — 92526 ORAL FUNCTION THERAPY: CPT

## 2018-05-03 PROCEDURE — A9270 NON-COVERED ITEM OR SERVICE: HCPCS | Performed by: NURSE PRACTITIONER

## 2018-05-03 PROCEDURE — 700102 HCHG RX REV CODE 250 W/ 637 OVERRIDE(OP): Performed by: INTERNAL MEDICINE

## 2018-05-03 PROCEDURE — A9270 NON-COVERED ITEM OR SERVICE: HCPCS | Performed by: HOSPITALIST

## 2018-05-03 RX ADMIN — HYDROCORTISONE 20 MG: 20 TABLET ORAL at 15:09

## 2018-05-03 RX ADMIN — ASPIRIN 81 MG: 81 TABLET, CHEWABLE ORAL at 09:28

## 2018-05-03 RX ADMIN — ACETAMINOPHEN 650 MG: 325 TABLET, FILM COATED ORAL at 07:38

## 2018-05-03 RX ADMIN — MIDODRINE HYDROCHLORIDE 10 MG: 5 TABLET ORAL at 19:49

## 2018-05-03 RX ADMIN — MIDODRINE HYDROCHLORIDE 10 MG: 5 TABLET ORAL at 17:24

## 2018-05-03 RX ADMIN — ENOXAPARIN SODIUM 40 MG: 100 INJECTION SUBCUTANEOUS at 09:28

## 2018-05-03 RX ADMIN — PREGABALIN 150 MG: 75 CAPSULE ORAL at 19:48

## 2018-05-03 RX ADMIN — MIDODRINE HYDROCHLORIDE 10 MG: 5 TABLET ORAL at 07:40

## 2018-05-03 RX ADMIN — LEVETIRACETAM 500 MG: 500 TABLET, FILM COATED ORAL at 09:28

## 2018-05-03 RX ADMIN — ACETAMINOPHEN 650 MG: 325 TABLET, FILM COATED ORAL at 18:19

## 2018-05-03 RX ADMIN — MICONAZOLE NITRATE: 20 CREAM TOPICAL at 19:49

## 2018-05-03 RX ADMIN — HYDROCORTISONE 20 MG: 20 TABLET ORAL at 19:49

## 2018-05-03 RX ADMIN — LEVETIRACETAM 500 MG: 500 TABLET, FILM COATED ORAL at 19:48

## 2018-05-03 RX ADMIN — MIDODRINE HYDROCHLORIDE 10 MG: 5 TABLET ORAL at 13:25

## 2018-05-03 RX ADMIN — HYDROCORTISONE 20 MG: 20 TABLET ORAL at 09:28

## 2018-05-03 RX ADMIN — PREGABALIN 150 MG: 75 CAPSULE ORAL at 09:28

## 2018-05-03 ASSESSMENT — ENCOUNTER SYMPTOMS
COUGH: 0
TINGLING: 0
FEVER: 0
BLURRED VISION: 0
PALPITATIONS: 0
ABDOMINAL PAIN: 0
HEADACHES: 0
CHILLS: 0
NAUSEA: 0
WEAKNESS: 1
SENSORY CHANGE: 0
DIZZINESS: 0
CONSTIPATION: 0
HEARTBURN: 0
DEPRESSION: 0
VOMITING: 0
SHORTNESS OF BREATH: 0
WEIGHT LOSS: 1
TREMORS: 0
SPEECH CHANGE: 0
MYALGIAS: 0

## 2018-05-03 ASSESSMENT — PATIENT HEALTH QUESTIONNAIRE - PHQ9
SUM OF ALL RESPONSES TO PHQ9 QUESTIONS 1 AND 2: 0
1. LITTLE INTEREST OR PLEASURE IN DOING THINGS: NOT AT ALL
2. FEELING DOWN, DEPRESSED, IRRITABLE, OR HOPELESS: NOT AT ALL

## 2018-05-03 ASSESSMENT — COGNITIVE AND FUNCTIONAL STATUS - GENERAL
MOVING TO AND FROM BED TO CHAIR: A LITTLE
MOVING FROM LYING ON BACK TO SITTING ON SIDE OF FLAT BED: A LITTLE
HELP NEEDED FOR BATHING: A LITTLE
EATING MEALS: A LITTLE
CLIMB 3 TO 5 STEPS WITH RAILING: A LOT
PERSONAL GROOMING: A LITTLE
MOBILITY SCORE: 18
SUGGESTED CMS G CODE MODIFIER MOBILITY: CK
TOILETING: A LITTLE
STANDING UP FROM CHAIR USING ARMS: A LITTLE
DRESSING REGULAR UPPER BODY CLOTHING: A LITTLE
DAILY ACTIVITIY SCORE: 18
DRESSING REGULAR LOWER BODY CLOTHING: A LITTLE
SUGGESTED CMS G CODE MODIFIER DAILY ACTIVITY: CK
WALKING IN HOSPITAL ROOM: A LITTLE

## 2018-05-03 ASSESSMENT — GAIT ASSESSMENTS
DISTANCE (FEET): 1
GAIT LEVEL OF ASSIST: CONTACT GUARD ASSIST
DEVIATION: ANTALGIC;DECREASED BASE OF SUPPORT;BRADYKINETIC;SHUFFLED GAIT;DECREASED HEEL STRIKE;DECREASED TOE OFF
ASSISTIVE DEVICE: FRONT WHEEL WALKER

## 2018-05-03 ASSESSMENT — PAIN SCALES - GENERAL: PAINLEVEL_OUTOF10: 2

## 2018-05-03 NOTE — CARE PLAN
Problem: Safety  Goal: Will remain free from injury  Outcome: PROGRESSING AS EXPECTED  Bed alarm in place    Problem: Skin Integrity  Goal: Risk for impaired skin integrity will decrease  Outcome: PROGRESSING AS EXPECTED  Turning self in  bed

## 2018-05-03 NOTE — DISCHARGE PLANNING
[facility] has declined patient as they are not accepting LOAs at this time. [facility] has declined patient due to discharge plan and no LOAs at this time.

## 2018-05-03 NOTE — THERAPY
"Speech Language Therapy Aphasia and Dysphagia tx completed.   Functional Status: Pt awake, alert and cooperative but appearing drowsy, in pain and pt reporting being \"foggy.\" RN reporting pt coughing while drinking thin liquids with breakfast today. This was seen during tx session with consecutive straw drinks but not when taking single small drinks. She was educated to no longer use straws and to take single drinks. Reading comprehension testing completed with significant breakdown at the 1-2 sentence level.  Recommendations: Will continue dys3/thin liquids with new swallow strategies (no straws, single sips of thin liquids). If she has continue coughing with meals will consider FEES. RN to monitor intake closely today with SLP to f/u tomorrow.    Plan of Care: Will benefit from Speech Therapy 5 times per week  Post-Acute Therapy: Discharge to a transitional care facility for continued skilled therapy services.    See \"Rehab Therapy-Acute\" Patient Summary Report for complete documentation.     "

## 2018-05-03 NOTE — PROGRESS NOTES
[facility] Hospitalist Progress Note    Date of Service: 5/3/2018    Chief Complaint  53 y.o. female admitted 4/10/2018 with ams.  Found to have multiple acute CVAs on MRI.  New onset seizure disorder.    Interval Problem Update  No acute events overnight.  BP has remained stable.  Patient appears more alert today.  Encourage to ambulate today.     Consultants/Specialty  Neurology    Disposition  Plan for snf at d/c.  [facility].        Review of Systems   Constitutional: Positive for weight loss. Negative for chills and fever.   HENT: Negative for congestion.    Eyes: Negative for blurred vision.   Respiratory: Negative for cough and shortness of breath.    Cardiovascular: Negative for chest pain, palpitations and leg swelling.   Gastrointestinal: Negative for abdominal pain, constipation, heartburn, nausea and vomiting.   Genitourinary: Negative for dysuria and urgency.   Musculoskeletal: Negative for myalgias.   Skin: Negative for itching and rash.   Neurological: Positive for weakness. Negative for dizziness, tingling, tremors, sensory change, speech change and headaches.   Psychiatric/Behavioral: Negative for depression.      Physical Exam  Laboratory/Imaging   Hemodynamics  Temp (24hrs), Av.6 °C (97.9 °F), Min:36.2 °C (97.2 °F), Max:37 °C (98.6 °F)   Temperature: 36.2 °C (97.2 °F)  Pulse  Av.5  Min: 12  Max: 157    Blood Pressure: (!) 99/64 (R.N. notified)     Respiratory      Respiration: 16, Pulse Oximetry: 99 %     Work Of Breathing / Effort: Mild  RUL Breath Sounds: Clear, RML Breath Sounds: Clear, RLL Breath Sounds: Diminished, JOLYNN Breath Sounds: Clear, LLL Breath Sounds: Diminished    Fluids    Intake/Output Summary (Last 24 hours) at 18 1015  Last data filed at 18 0750   Gross per 24 hour   Intake             1050 ml   Output              250 ml   Net              800 ml       Nutrition  Orders Placed This Encounter   Procedures   • DIET ORDER     Standing Status:   Standing      Number of Occurrences:   1     Order Specific Question:   Diet:     Answer:   Regular [1]     Order Specific Question:   Texture/Fiber modifications:     Answer:   Dysphagia 3(Mechanical Soft)specify fluid consistency(question 6) [3]     Order Specific Question:   Consistency/Fluid modifications:     Answer:   Thin Liquids [3]     Physical Exam   Constitutional: She is oriented to person, place, and time. She appears well-developed. No distress.   Thin white female   HENT:   Head: Normocephalic and atraumatic.   Mouth/Throat: No oropharyngeal exudate.   Eyes: Conjunctivae are normal. Pupils are equal, round, and reactive to light. Right eye exhibits no discharge. Left eye exhibits no discharge.   Neck: Neck supple. No tracheal deviation present.   Cardiovascular: Normal rate, regular rhythm, normal heart sounds and intact distal pulses.    No murmur heard.  Pulmonary/Chest: Effort normal and breath sounds normal. No stridor. No respiratory distress. She has no wheezes. She has no rales.   Abdominal: Soft. Bowel sounds are normal. She exhibits no distension. There is no tenderness. There is no rebound.   Musculoskeletal: Normal range of motion. She exhibits no edema or deformity.   Neurological: She is alert and oriented to person, place, and time. She exhibits normal muscle tone. Coordination normal.   Skin: Skin is warm and dry. No rash noted. She is not diaphoretic. No erythema.       Recent Labs      05/03/18   0251   WBC  19.2*   RBC  4.77   HEMOGLOBIN  14.1   HEMATOCRIT  43.0   MCV  90.1   MCH  29.6   MCHC  32.8*   RDW  43.3   PLATELETCT  239   MPV  10.1     Recent Labs      05/02/18   0236  05/03/18   0251   SODIUM  138  138   POTASSIUM  4.2  4.3   CHLORIDE  102  102   CO2  29  28   GLUCOSE  92  107*   BUN  21  21   CREATININE  0.64  0.58   CALCIUM  9.0  8.7                      Assessment/Plan     * Stroke syndrome (HCC)   Assessment & Plan    Multifocal ischemic infarcts  Neurology following    Carotid US  normal.  On aspirin  LACI w bubble study showed PFO.  Patient wishing to treat medically at this time.  Anticipate loop recorder placement prior to d/c to r/o embolic origin.  She passed her swallow eval and has been tolerating a diet.  Anticipate need for SNF at d/c.    MRI Brain:  1.  Multifocal areas of acute infarction with the largest area of infarction located superiorly in the left frontal lobe extending to the cortex. Much smaller areas of acute infarction are noted in the left posterior occipital lobe, right   parietal-occipital cortex, and right hemicerebellum.    2.  Mild periventricular and juxtacortical white matter changes consistent with chronic microvascular ischemic gliosis            Acute respiratory failure with hypoxia (HCC)- (present on admission)   Assessment & Plan    Likely secondary to seizure.  Requiring intubation on 4/26 and extubation on 4/27.  Pulmonology s/o  No post extubation complications        Hypotension- (present on admission)   Assessment & Plan    On 5/2 had episode of unresponsiveness while ambulating secondary to hypotension.  Improving.  BP more stable.   Continue hydrocortisone and midodrine  Orthostatics ordered.          Seizure disorder as sequela of cerebrovascular accident (HCC)- (present on admission)   Assessment & Plan    Neurology following.  Continuous EEG 4/26 consistent with non convulsive seizures.  No further seizure activity with increased dose of keppra.            Drug overdose - unintentional- (present on admission)   Assessment & Plan    Buprenorphine patch removed PTA.   Patient was on high doses of narcotics at home for chronic pain and fibromyalgia  D/C scheduled norco.  Conservative use of narcotics.                 Weakness   Assessment & Plan    Secondary to above.  PT/OT following.   Anticipate SNF at d/c.        Normocytic anemia- (present on admission)   Assessment & Plan    Stable.        Chronic pain syndrome- (present on admission)    Assessment & Plan    Pain controlled.  D/C scheduled norco  Conservative use of narcotics due to possible unintentional narcotics overdose.          Campylobacter diarrhea- (present on admission)   Assessment & Plan    Campylobacter on cultures from transferring facility. C-diff negative here on 4/10/18.            Hyperglycemia   Assessment & Plan    A1C normal at 5.4. No hx of Dm.  This likely steroid induced.   Monitor blood glucoses.            Leukocytosis- (present on admission)   Assessment & Plan    WBC has remained elevated with elevated polys and low lymphs    Completed Unasyn and Azithro 4/16.  Procalcitonin was normal.  Clinically with no signs with active infection.   On Cortef which might contribute to leucocytosis.           Elevated brain natriuretic peptide (BNP) level   Assessment & Plan    BNP 8000s on arrival.   TTE showed normal diastolic function & EF 70%.  .            Elevated troponin- (present on admission)   Assessment & Plan    No CP or hx of CAD.  Normal echocardiogram.  Repeat trended down.         Autonomic neuropathy- (present on admission)   Assessment & Plan    On midodrine and hydrocortisone          Anorexia- (present on admission)   Assessment & Plan    Encourage oral intake.  Appropriate caloric intake discussed with patient and family.  She has required a PEG in the past.  Dietician following.        Aspiration pneumonia (HCC)- (present on admission)   Assessment & Plan    Completed a course of Unasyn.  CT chest normal                Quality-Core Measures   Reviewed items::  Labs reviewed and Medications reviewed  Bishop catheter::  No Bishop  DVT prophylaxis pharmacological::  Enoxaparin (Lovenox)  Assessed for rehabilitation services:  Patient was assess for and/or received rehabilitation services during this hospitalization

## 2018-05-03 NOTE — CODE DOCUMENTATION
Code blue called, never lost pulses. VSS upon RRT arrival. Primary MD notified, no orders obtained.

## 2018-05-03 NOTE — PROGRESS NOTES
Patient is A&Ox4. Patient is resting in bed. 3L o2via  NC. Dysphagia 3, thin liquid diet. Patient c/o of 8/10 head pain, medication given. Patient up to bedside commode with one person assist.  in place.

## 2018-05-03 NOTE — CARE PLAN
Problem: Safety  Goal: Will remain free from injury  Outcome: PROGRESSING AS EXPECTED  Call light within reach, bed in lowest positon, bed alarm on. Educated pt on the use of call light.     Problem: Pain Management  Goal: Pain level will decrease to patient's comfort goal  Outcome: PROGRESSING AS EXPECTED  Pain assessed, call light within reach

## 2018-05-03 NOTE — THERAPY
"Occupational Therapy Treatment completed with focus on ADLs, ADL transfers and patient education.  Functional Status:  Pt seen for OT tx today.  Pt was pleasant and cooperative during the session.  Continues to be limited by endurance, transfers, and self care.  Pt completed LB dressing with CGA.  Needing no cues to initiate, follow through, and problem solve during ADL tasks. Pt completed supine to sit mod I, sit to stand with CGA, and room ambulation using FWW from bed to chair on other side of room.  PTA was present during tx and orthostatics were taking during all activity throughout session and reported to MD outside the room.  BP in supine: 122/64,   in sittin/64, attempt to stand-required sit break for dizziness: 83/54,   post amb 10 ft: 75/56, post rest break in chair and seated marches: 87/56.  Pt would benefit from continued inpt OT as they are continuing to need assistance with endurance, weakness, low BP during activity, and ADLs.   Plan of Care: Will benefit from Occupational Therapy 3 times per week  Discharge Recommendations:  Equipment Will Continue to Assess for Equipment Needs.   See \"Rehab Therapy-Acute\" Patient Summary Report for complete documentation.   "

## 2018-05-03 NOTE — CARE PLAN
Problem: Nutritional:  Goal: Achieve adequate nutritional intake  Patient will tolerate po diet with at least 50% intake of meals.   Outcome: PROGRESSING AS EXPECTED  Pt states her appetite is good and reports eating at least 25% of her meals. Pt enjoys smoothies and yogurt. Adjusted snacks. Pt doesn't drink the Boost Plus. Per chart pt PO < 25-75%. RD will continue to follow.

## 2018-05-03 NOTE — THERAPY
"Pt cleared for mobility by RN, despite low BPs. Pt w/impaired balance, gait, and activity tolerance. Pt limited by low BPs. Pt w/poor safety awareness, requiring verbal cuing. Pt demonstrated quick but shuffled gait, requring verbal cuing for pacing and breathing techniques. Pt would benefit from further acute PT txs to progress towards goals and independence. Would recommend post acute placement to address deficits.    BP assessed:   in supine: 122/64,   in sittin/64,   attempt to stand-required sit break for dizziness: 83/54,   post amb 10 ft: 75/56,   post rest break in chair and seated march: 87/56    Physical Therapy Treatment completed.   Bed Mobility:  Supine to Sit: Modified Independent  Transfers: Sit to Stand: Supervised  Gait: Level Of Assist: Contact Guard Assist with Front-Wheel Walker       Plan of Care: Will benefit from Physical Therapy 3 times per week    See \"Rehab Therapy-Acute\" Patient Summary Report for complete documentation.       "

## 2018-05-03 NOTE — PROGRESS NOTES
Pt with coughing after drinking this am, reminded of swallow precautions and SLP notified. Pt also with decreased BP when getting OOB per PT/OT who performed orthostatic BPs.

## 2018-05-03 NOTE — DISCHARGE PLANNING
SW received call form pt's daughter, Ian. Daughter was upset to hear from pt that a code blue was called this AM (around 0630) and she was not notified. Daughter requesting call from attending to discuss care plan with pt's recent codes. APRN notified in rounds.     SW updated daughter on status of SNF referrals.

## 2018-05-03 NOTE — PROGRESS NOTES
Assumed care of pt at 0700.   Pt is A&O to person and place only.   Pt denies n/v and n/t at this time.   Medication given per MAR.   Pt is x1 assist to bathroom.   Pt on 3L O2 via NC.   Seizure precautions in place.   Plan of care discussed.   Hourly rounding in place.

## 2018-05-03 NOTE — DISCHARGE PLANNING
[facility] and [facility] have declined patient as they are not accepting LOAs at this time.

## 2018-05-04 PROCEDURE — 700102 HCHG RX REV CODE 250 W/ 637 OVERRIDE(OP): Performed by: NURSE PRACTITIONER

## 2018-05-04 PROCEDURE — 700102 HCHG RX REV CODE 250 W/ 637 OVERRIDE(OP): Performed by: HOSPITALIST

## 2018-05-04 PROCEDURE — A9270 NON-COVERED ITEM OR SERVICE: HCPCS | Performed by: INTERNAL MEDICINE

## 2018-05-04 PROCEDURE — 700111 HCHG RX REV CODE 636 W/ 250 OVERRIDE (IP): Performed by: HOSPITALIST

## 2018-05-04 PROCEDURE — 700102 HCHG RX REV CODE 250 W/ 637 OVERRIDE(OP): Performed by: INTERNAL MEDICINE

## 2018-05-04 PROCEDURE — 99232 SBSQ HOSP IP/OBS MODERATE 35: CPT | Performed by: HOSPITALIST

## 2018-05-04 PROCEDURE — A9270 NON-COVERED ITEM OR SERVICE: HCPCS | Performed by: NURSE PRACTITIONER

## 2018-05-04 PROCEDURE — 770006 HCHG ROOM/CARE - MED/SURG/GYN SEMI*

## 2018-05-04 PROCEDURE — A9270 NON-COVERED ITEM OR SERVICE: HCPCS | Performed by: HOSPITALIST

## 2018-05-04 RX ORDER — HYDROCORTISONE 20 MG/1
40 TABLET ORAL 3 TIMES DAILY
Status: DISCONTINUED | OUTPATIENT
Start: 2018-05-04 | End: 2018-05-10

## 2018-05-04 RX ADMIN — MIDODRINE HYDROCHLORIDE 10 MG: 5 TABLET ORAL at 20:06

## 2018-05-04 RX ADMIN — ENOXAPARIN SODIUM 40 MG: 100 INJECTION SUBCUTANEOUS at 08:36

## 2018-05-04 RX ADMIN — HYDROCORTISONE 40 MG: 20 TABLET ORAL at 08:40

## 2018-05-04 RX ADMIN — STANDARDIZED SENNA CONCENTRATE AND DOCUSATE SODIUM 2 TABLET: 8.6; 5 TABLET, FILM COATED ORAL at 20:07

## 2018-05-04 RX ADMIN — OXYCODONE HYDROCHLORIDE 10 MG: 10 TABLET ORAL at 17:55

## 2018-05-04 RX ADMIN — MIDODRINE HYDROCHLORIDE 10 MG: 5 TABLET ORAL at 17:55

## 2018-05-04 RX ADMIN — PREGABALIN 150 MG: 75 CAPSULE ORAL at 20:07

## 2018-05-04 RX ADMIN — HYDROCORTISONE 40 MG: 20 TABLET ORAL at 20:07

## 2018-05-04 RX ADMIN — MIDODRINE HYDROCHLORIDE 10 MG: 5 TABLET ORAL at 08:36

## 2018-05-04 RX ADMIN — LEVETIRACETAM 500 MG: 500 TABLET, FILM COATED ORAL at 08:36

## 2018-05-04 RX ADMIN — LEVETIRACETAM 500 MG: 500 TABLET, FILM COATED ORAL at 20:07

## 2018-05-04 RX ADMIN — HYDROCORTISONE 40 MG: 20 TABLET ORAL at 14:52

## 2018-05-04 RX ADMIN — ACETAMINOPHEN 650 MG: 325 TABLET, FILM COATED ORAL at 02:56

## 2018-05-04 RX ADMIN — MIDODRINE HYDROCHLORIDE 10 MG: 5 TABLET ORAL at 14:52

## 2018-05-04 RX ADMIN — PREGABALIN 150 MG: 75 CAPSULE ORAL at 08:36

## 2018-05-04 RX ADMIN — ASPIRIN 81 MG: 81 TABLET, CHEWABLE ORAL at 08:36

## 2018-05-04 ASSESSMENT — ENCOUNTER SYMPTOMS
CONSTIPATION: 0
TINGLING: 0
DOUBLE VISION: 0
DIARRHEA: 0
HEADACHES: 0
HEARTBURN: 0
CHILLS: 0
SENSORY CHANGE: 0
TREMORS: 0
WEIGHT LOSS: 1
DEPRESSION: 0
ABDOMINAL PAIN: 0
NAUSEA: 0
BLURRED VISION: 0
SHORTNESS OF BREATH: 0
WEAKNESS: 1
FEVER: 0
DIZZINESS: 0
MYALGIAS: 0
SORE THROAT: 0
VOMITING: 0

## 2018-05-04 ASSESSMENT — PAIN SCALES - GENERAL
PAINLEVEL_OUTOF10: 0
PAINLEVEL_OUTOF10: 9
PAINLEVEL_OUTOF10: 3

## 2018-05-04 NOTE — DISCHARGE PLANNING
Per [place], second round of SNF JUANIS referrals sent out to all local facilities. All SNFs have denied for a second time except [facility].     [facility] to look into expanding SNF referrals in [facility].

## 2018-05-04 NOTE — PROGRESS NOTES
[facility] Hospitalist Progress Note    Date of Service: 2018    Chief Complaint  53 y.o. female admitted 4/10/2018 with ams.  Found to have multiple acute CVAs on MRI.  New onset seizure disorder.    Interval Problem Update  BP is much improved.  Patient denying dizziness with ambulation.  Appears forgetful as she does not recall working with therapy yesterday.  Denies pain.  VSS.    Consultants/Specialty  Neurology    Disposition  Plan for snf at d/c.  SW following.        Review of Systems   Constitutional: Positive for malaise/fatigue and weight loss. Negative for chills and fever.   HENT: Negative for congestion and sore throat.    Eyes: Negative for blurred vision and double vision.   Respiratory: Negative for shortness of breath.    Cardiovascular: Negative for chest pain and leg swelling.   Gastrointestinal: Negative for abdominal pain, constipation, diarrhea, heartburn, nausea and vomiting.   Genitourinary: Negative for dysuria.   Musculoskeletal: Negative for joint pain and myalgias.   Skin: Negative for rash.   Neurological: Positive for weakness. Negative for dizziness, tingling, tremors, sensory change and headaches.   Psychiatric/Behavioral: Negative for depression.      Physical Exam  Laboratory/Imaging   Hemodynamics  Temp (24hrs), Av.6 °C (97.8 °F), Min:36.3 °C (97.4 °F), Max:37.1 °C (98.7 °F)   Temperature: 36.4 °C (97.6 °F)  Pulse  Av.5  Min: 12  Max: 157    Blood Pressure: 109/71     Respiratory      Respiration: 18, Pulse Oximetry: 100 %     Work Of Breathing / Effort: Mild  RUL Breath Sounds: Clear, RML Breath Sounds: Clear, RLL Breath Sounds: Clear, JOLYNN Breath Sounds: Clear, LLL Breath Sounds: Clear    Fluids    Intake/Output Summary (Last 24 hours) at 18 1209  Last data filed at 18 0557   Gross per 24 hour   Intake              325 ml   Output                0 ml   Net              325 ml       Nutrition  Orders Placed This Encounter   Procedures   • DIET ORDER      Standing Status:   Standing     Number of Occurrences:   1     Order Specific Question:   Diet:     Answer:   Regular [1]     Order Specific Question:   Texture/Fiber modifications:     Answer:   Dysphagia 3(Mechanical Soft)specify fluid consistency(question 6) [3]     Order Specific Question:   Consistency/Fluid modifications:     Answer:   Thin Liquids [3]     Physical Exam   Constitutional: She appears well-developed.   Thin white female   HENT:   Head: Normocephalic and atraumatic.   Eyes: Conjunctivae are normal. Pupils are equal, round, and reactive to light. No scleral icterus.   Neck: Neck supple. No JVD present.   Cardiovascular: Normal rate, regular rhythm, normal heart sounds and intact distal pulses.  Exam reveals no friction rub.    No murmur heard.  Pulmonary/Chest: Effort normal and breath sounds normal. No stridor. No respiratory distress. She has no wheezes.   Abdominal: Soft. Bowel sounds are normal. She exhibits no distension. There is no tenderness.   Musculoskeletal: Normal range of motion. She exhibits no edema.   Neurological: She is alert. Coordination normal.   Oriented x 2.  Forgetful.   Skin: Skin is warm and dry. She is not diaphoretic. No erythema.       Recent Labs      05/03/18   0251   WBC  19.2*   RBC  4.77   HEMOGLOBIN  14.1   HEMATOCRIT  43.0   MCV  90.1   MCH  29.6   MCHC  32.8*   RDW  43.3   PLATELETCT  239   MPV  10.1     Recent Labs      05/02/18   0236  05/03/18   0251   SODIUM  138  138   POTASSIUM  4.2  4.3   CHLORIDE  102  102   CO2  29  28   GLUCOSE  92  107*   BUN  21  21   CREATININE  0.64  0.58   CALCIUM  9.0  8.7                      Assessment/Plan     * Acute CVA (cerebrovascular accident) ([facility])   Assessment & Plan    Multifocal ischemic infarcts  Neurology following    Carotid US normal.  On aspirin  LACI w bubble study showed PFO.  Patient wishing to treat medically at this time.  Anticipate loop recorder placement prior to d/c to r/o embolic origin.  She passed her  swallow eval and has been tolerating a diet.  Anticipate need for SNF at d/c.    MRI Brain:  1.  Multifocal areas of acute infarction with the largest area of infarction located superiorly in the left frontal lobe extending to the cortex. Much smaller areas of acute infarction are noted in the left posterior occipital lobe, right   parietal-occipital cortex, and right hemicerebellum.    2.  Mild periventricular and juxtacortical white matter changes consistent with chronic microvascular ischemic gliosis            Acute respiratory failure with hypoxia (HCC)- (present on admission)   Assessment & Plan    Likely secondary to seizure.  Requiring intubation on 4/26 and extubation on 4/27.  Pulmonology s/o  No post extubation complications        Hypotension- (present on admission)   Assessment & Plan    On 5/2 had episode of unresponsiveness while ambulating secondary to hypotension.  Improving.  BP more stable.   Still having positive orthostatics  Increase hydrocortisone   Continue midodrine.            Seizure disorder as sequela of cerebrovascular accident (HCC)- (present on admission)   Assessment & Plan    Neurology following.  Continuous EEG 4/26 consistent with non convulsive seizures.  No further seizure activity with increased dose of keppra.            Drug overdose - unintentional- (present on admission)   Assessment & Plan    Buprenorphine patch removed PTA.   Patient was on high doses of narcotics at home for chronic pain and fibromyalgia  Conservative use of narcotics.                 Weakness   Assessment & Plan    Secondary to above.  PT/OT following.   Anticipate SNF at d/c.        Normocytic anemia- (present on admission)   Assessment & Plan    Stable.        Chronic pain syndrome- (present on admission)   Assessment & Plan    Pain controlled.  D/C scheduled norco  Conservative use of narcotics due to possible unintentional narcotics overdose.          Campylobacter diarrhea- (present on admission)    Assessment & Plan    Campylobacter on cultures from transferring facility. C-diff negative here on 4/10/18.            Hyperglycemia   Assessment & Plan    A1C normal at 5.4. No hx of Dm.  This likely steroid induced.   Monitor blood glucoses.            Leukocytosis- (present on admission)   Assessment & Plan    WBC has remained elevated with elevated polys and low lymphs    Completed Unasyn and Azithro 4/16.  Procalcitonin was normal.  Clinically with no signs with active infection.   Likely secondary to steroids.          Elevated brain natriuretic peptide (BNP) level   Assessment & Plan    BNP 8000s on arrival.   TTE showed normal diastolic function & EF 70%.  .            Elevated troponin- (present on admission)   Assessment & Plan    No CP or hx of CAD.  Normal echocardiogram.  Repeat trended down.         Autonomic neuropathy- (present on admission)   Assessment & Plan    On midodrine and hydrocortisone          Anorexia- (present on admission)   Assessment & Plan    Encourage oral intake.  Appropriate caloric intake discussed with patient and family.  She has required a PEG in the past.  Dietician following.        Aspiration pneumonia (HCC)- (present on admission)   Assessment & Plan    Completed a course of Unasyn.  CT chest normal                Quality-Core Measures   Reviewed items::  Labs reviewed and Medications reviewed  Bishop catheter::  No Bishop  DVT prophylaxis pharmacological::  Enoxaparin (Lovenox)  Assessed for rehabilitation services:  Patient was assess for and/or received rehabilitation services during this hospitalization

## 2018-05-04 NOTE — CARE PLAN
Problem: Venous Thromboembolism (VTW)/Deep Vein Thrombosis (DVT) Prevention:  Goal: Patient will participate in Venous Thrombosis (VTE)/Deep Vein Thrombosis (DVT)Prevention Measures  Outcome: PROGRESSING AS EXPECTED  Pt receiving lovenox injections qday to prevent VTE. SCDs also in place when in bed.    Problem: Psychosocial Needs:  Goal: Level of anxiety will decrease  Outcome: PROGRESSING AS EXPECTED  Pt today has exhibited no anxiety. Resting in bed comfortably. All needs met, call light within reach, will continue to monitor.

## 2018-05-04 NOTE — CARE PLAN
Problem: Safety  Goal: Will remain free from injury  Outcome: PROGRESSING AS EXPECTED  Bed alarm on. Call light within reach. Staff rounding frequently on her.    Problem: Respiratory:  Goal: Respiratory status will improve  Outcome: PROGRESSING AS EXPECTED  On 3 liters of oxygen. Sats within normal limits. Reminded to keep oxygen on.

## 2018-05-04 NOTE — DISCHARGE PLANNING
[facility] and [facility] have declined as they are not accepting LOAs at this time. [facility] has declined patient as they do not feel patient is appropriate for their facility.

## 2018-05-04 NOTE — DISCHARGE PLANNING
[facility] has declined patient due to non-contracted insurance provider. [facility] has declined patient due to no MediCal bed available. [facility] and [facility]. Referral faxed to [phone].

## 2018-05-04 NOTE — DISCHARGE PLANNING
Per rounds, pt has officially been denied from SNF LOAs twice from all local facilities. [facility] to look into [place] SNFs.     Pt remains as a difficult placement.

## 2018-05-04 NOTE — PROGRESS NOTES
Pt is alert and oriented times four. Reported mild posterior neck pain. On 3 liters of oxygen, sats within normal limits. Up with assist of one. Using bedside commode. No syncopal episode yet this shift. Will monitor.

## 2018-05-05 LAB
ERYTHROCYTE [DISTWIDTH] IN BLOOD BY AUTOMATED COUNT: 44.5 FL (ref 35.9–50)
HCT VFR BLD AUTO: 43.4 % (ref 37–47)
HGB BLD-MCNC: 14.3 G/DL (ref 12–16)
MCH RBC QN AUTO: 29.6 PG (ref 27–33)
MCHC RBC AUTO-ENTMCNC: 32.9 G/DL (ref 33.6–35)
MCV RBC AUTO: 89.9 FL (ref 81.4–97.8)
PLATELET # BLD AUTO: 238 K/UL (ref 164–446)
PMV BLD AUTO: 10.7 FL (ref 9–12.9)
RBC # BLD AUTO: 4.83 M/UL (ref 4.2–5.4)
WBC # BLD AUTO: 20.4 K/UL (ref 4.8–10.8)

## 2018-05-05 PROCEDURE — 700102 HCHG RX REV CODE 250 W/ 637 OVERRIDE(OP): Performed by: NURSE PRACTITIONER

## 2018-05-05 PROCEDURE — 700111 HCHG RX REV CODE 636 W/ 250 OVERRIDE (IP): Performed by: HOSPITALIST

## 2018-05-05 PROCEDURE — A9270 NON-COVERED ITEM OR SERVICE: HCPCS | Performed by: INTERNAL MEDICINE

## 2018-05-05 PROCEDURE — A9270 NON-COVERED ITEM OR SERVICE: HCPCS | Performed by: HOSPITALIST

## 2018-05-05 PROCEDURE — A9270 NON-COVERED ITEM OR SERVICE: HCPCS | Performed by: NURSE PRACTITIONER

## 2018-05-05 PROCEDURE — 99232 SBSQ HOSP IP/OBS MODERATE 35: CPT | Performed by: HOSPITALIST

## 2018-05-05 PROCEDURE — 700102 HCHG RX REV CODE 250 W/ 637 OVERRIDE(OP): Performed by: HOSPITALIST

## 2018-05-05 PROCEDURE — 700102 HCHG RX REV CODE 250 W/ 637 OVERRIDE(OP): Performed by: INTERNAL MEDICINE

## 2018-05-05 PROCEDURE — 36415 COLL VENOUS BLD VENIPUNCTURE: CPT

## 2018-05-05 PROCEDURE — 770006 HCHG ROOM/CARE - MED/SURG/GYN SEMI*

## 2018-05-05 PROCEDURE — 85027 COMPLETE CBC AUTOMATED: CPT

## 2018-05-05 RX ADMIN — LEVETIRACETAM 500 MG: 500 TABLET, FILM COATED ORAL at 19:53

## 2018-05-05 RX ADMIN — MIDODRINE HYDROCHLORIDE 10 MG: 5 TABLET ORAL at 08:39

## 2018-05-05 RX ADMIN — PREGABALIN 150 MG: 75 CAPSULE ORAL at 19:52

## 2018-05-05 RX ADMIN — MIDODRINE HYDROCHLORIDE 10 MG: 5 TABLET ORAL at 12:15

## 2018-05-05 RX ADMIN — MIDODRINE HYDROCHLORIDE 10 MG: 5 TABLET ORAL at 17:09

## 2018-05-05 RX ADMIN — LEVETIRACETAM 500 MG: 500 TABLET, FILM COATED ORAL at 08:39

## 2018-05-05 RX ADMIN — OXYCODONE HYDROCHLORIDE 10 MG: 10 TABLET ORAL at 23:46

## 2018-05-05 RX ADMIN — HYDROCORTISONE 40 MG: 20 TABLET ORAL at 15:07

## 2018-05-05 RX ADMIN — ASPIRIN 81 MG: 81 TABLET, CHEWABLE ORAL at 08:39

## 2018-05-05 RX ADMIN — OXYCODONE HYDROCHLORIDE 10 MG: 10 TABLET ORAL at 18:12

## 2018-05-05 RX ADMIN — STANDARDIZED SENNA CONCENTRATE AND DOCUSATE SODIUM 2 TABLET: 8.6; 5 TABLET, FILM COATED ORAL at 19:52

## 2018-05-05 RX ADMIN — HYDROCORTISONE 40 MG: 20 TABLET ORAL at 19:52

## 2018-05-05 RX ADMIN — ENOXAPARIN SODIUM 40 MG: 100 INJECTION SUBCUTANEOUS at 08:38

## 2018-05-05 RX ADMIN — MIDODRINE HYDROCHLORIDE 10 MG: 5 TABLET ORAL at 19:52

## 2018-05-05 RX ADMIN — HYDROCORTISONE 40 MG: 20 TABLET ORAL at 08:37

## 2018-05-05 RX ADMIN — ACETAMINOPHEN 650 MG: 325 TABLET, FILM COATED ORAL at 05:55

## 2018-05-05 RX ADMIN — OXYCODONE HYDROCHLORIDE 10 MG: 10 TABLET ORAL at 12:15

## 2018-05-05 RX ADMIN — PREGABALIN 150 MG: 75 CAPSULE ORAL at 08:39

## 2018-05-05 ASSESSMENT — ENCOUNTER SYMPTOMS
WEAKNESS: 1
ABDOMINAL PAIN: 0
BLURRED VISION: 0
SHORTNESS OF BREATH: 0
PALPITATIONS: 0
SPEECH CHANGE: 0
HEARTBURN: 0
VOMITING: 0
CHILLS: 0
DIARRHEA: 0
SENSORY CHANGE: 0
DEPRESSION: 0
COUGH: 0
TREMORS: 0
DIZZINESS: 0
HEADACHES: 0
FEVER: 0
TINGLING: 0
NAUSEA: 0

## 2018-05-05 ASSESSMENT — PAIN SCALES - GENERAL
PAINLEVEL_OUTOF10: 8
PAINLEVEL_OUTOF10: 3
PAINLEVEL_OUTOF10: 4
PAINLEVEL_OUTOF10: 9
PAINLEVEL_OUTOF10: 6

## 2018-05-05 ASSESSMENT — PATIENT HEALTH QUESTIONNAIRE - PHQ9
1. LITTLE INTEREST OR PLEASURE IN DOING THINGS: NOT AT ALL
2. FEELING DOWN, DEPRESSED, IRRITABLE, OR HOPELESS: NOT AT ALL
SUM OF ALL RESPONSES TO PHQ9 QUESTIONS 1 AND 2: 0

## 2018-05-05 NOTE — PROGRESS NOTES
[facility]ist Progress Note    Date of Service: 2018    Chief Complaint  53 y.o. female admitted 4/10/2018 with ams.  Found to have multiple acute CVAs on MRI.  New onset seizure disorder.    Interval Problem Update  BP has remained stable.  Dizziness with ambulation much improved.  Patient stating she does not want to go to a rehab facility at discharge.  Long discussion with patient and daughter about rehab need.  Family is in agreement patient will need placement prior to returning home.    Consultants/Specialty  Neurology    Disposition  Plan for snf at d/c.  SW following.        Review of Systems   Constitutional: Positive for malaise/fatigue. Negative for chills and fever.   HENT: Negative for congestion.    Eyes: Negative for blurred vision.   Respiratory: Negative for cough and shortness of breath.    Cardiovascular: Negative for chest pain, palpitations and leg swelling.   Gastrointestinal: Negative for abdominal pain, diarrhea, heartburn, nausea and vomiting.   Genitourinary: Negative for dysuria and urgency.   Musculoskeletal: Negative for joint pain.   Skin: Negative for itching and rash.   Neurological: Positive for weakness. Negative for dizziness, tingling, tremors, sensory change, speech change and headaches.   Psychiatric/Behavioral: Negative for depression.      Physical Exam  Laboratory/Imaging   Hemodynamics  Temp (24hrs), Av.5 °C (97.7 °F), Min:36.3 °C (97.3 °F), Max:36.7 °C (98.1 °F)   Temperature: 36.7 °C (98.1 °F)  Pulse  Av.6  Min: 12  Max: 157    Blood Pressure: 129/79     Respiratory      Respiration: 16, Pulse Oximetry: 97 %     Work Of Breathing / Effort: Mild  RUL Breath Sounds: Clear, RML Breath Sounds: Clear, RLL Breath Sounds: Clear, JOLYNN Breath Sounds: Clear, LLL Breath Sounds: Clear    Fluids    Intake/Output Summary (Last 24 hours) at 18 1010  Last data filed at 18 0555   Gross per 24 hour   Intake              520 ml   Output                0 ml   Net               520 ml       Nutrition  Orders Placed This Encounter   Procedures   • DIET ORDER     Standing Status:   Standing     Number of Occurrences:   1     Order Specific Question:   Diet:     Answer:   Regular [1]     Order Specific Question:   Texture/Fiber modifications:     Answer:   Dysphagia 3(Mechanical Soft)specify fluid consistency(question 6) [3]     Order Specific Question:   Consistency/Fluid modifications:     Answer:   Thin Liquids [3]     Physical Exam   Constitutional: She appears well-developed. No distress.   Thin white female   HENT:   Head: Normocephalic and atraumatic.   Mouth/Throat: No oropharyngeal exudate.   Eyes: Conjunctivae are normal. Pupils are equal, round, and reactive to light. Right eye exhibits no discharge. Left eye exhibits no discharge.   Neck: Neck supple. No tracheal deviation present.   Cardiovascular: Normal rate, regular rhythm, normal heart sounds and intact distal pulses.    No murmur heard.  Pulmonary/Chest: Effort normal and breath sounds normal. No stridor. No respiratory distress. She has no wheezes. She has no rales.   Abdominal: Soft. Bowel sounds are normal. She exhibits no distension. There is no tenderness. There is no rebound.   Musculoskeletal: Normal range of motion. She exhibits no edema or deformity.   Neurological: She is alert. She exhibits normal muscle tone. Coordination normal.   Oriented x 2.  Forgetful.   Skin: Skin is warm and dry. No rash noted. She is not diaphoretic. No erythema.       Recent Labs      05/03/18   0251  05/05/18   0233   WBC  19.2*  20.4*   RBC  4.77  4.83   HEMOGLOBIN  14.1  14.3   HEMATOCRIT  43.0  43.4   MCV  90.1  89.9   MCH  29.6  29.6   MCHC  32.8*  32.9*   RDW  43.3  44.5   PLATELETCT  239  238   MPV  10.1  10.7     Recent Labs      05/03/18   0251   SODIUM  138   POTASSIUM  4.3   CHLORIDE  102   CO2  28   GLUCOSE  107*   BUN  21   CREATININE  0.58   CALCIUM  8.7                      Assessment/Plan     * Acute CVA  (cerebrovascular accident) (HCC)   Assessment & Plan    Multifocal ischemic infarcts  Neurology following    Carotid US normal.  On aspirin  LACI w bubble study showed PFO.  Patient wishing to treat medically at this time.  Anticipate loop recorder placement prior to d/c to r/o embolic origin.  She passed her swallow eval and has been tolerating a diet.  Anticipate need for SNF at d/c.    MRI Brain:  1.  Multifocal areas of acute infarction with the largest area of infarction located superiorly in the left frontal lobe extending to the cortex. Much smaller areas of acute infarction are noted in the left posterior occipital lobe, right   parietal-occipital cortex, and right hemicerebellum.    2.  Mild periventricular and juxtacortical white matter changes consistent with chronic microvascular ischemic gliosis            Acute respiratory failure with hypoxia (HCC)- (present on admission)   Assessment & Plan    Likely secondary to seizure.  Requiring intubation on 4/26 and extubation on 4/27.  Pulmonology s/o  No post extubation complications        Hypotension- (present on admission)   Assessment & Plan    On 5/2 had episode of unresponsiveness while ambulating secondary to hypotension.  Improved.  BP more stable.   Dizziness while ambulating much improved.  Continue midodrine and hydrocortisone.            Seizure disorder as sequela of cerebrovascular accident (HCC)- (present on admission)   Assessment & Plan    Neurology following.  Continuous EEG 4/26 consistent with non convulsive seizures.  No further seizure activity with increased dose of keppra.            Drug overdose - unintentional- (present on admission)   Assessment & Plan    Buprenorphine patch removed PTA.   Patient was on high doses of narcotics at home for chronic pain and fibromyalgia  Conservative use of narcotics.                 Weakness   Assessment & Plan    Secondary to above.  PT/OT following.   Pending SNF placement.        Normocytic  anemia- (present on admission)   Assessment & Plan    Stable.        Chronic pain syndrome- (present on admission)   Assessment & Plan    Pain controlled.  Conservative use of narcotics due to possible unintentional narcotics overdose.          Campylobacter diarrhea- (present on admission)   Assessment & Plan    Campylobacter on cultures from transferring facility. C-diff negative here on 4/10/18.            Hyperglycemia   Assessment & Plan    A1C normal at 5.4. No hx of Dm.  This likely steroid induced.   Monitor blood glucoses.            Leukocytosis- (present on admission)   Assessment & Plan    WBC has remained elevated with elevated polys and low lymphs    Completed Unasyn and Azithro 4/16.  Procalcitonin was normal.  Clinically with no signs with active infection.   Likely secondary to steroids.          Elevated brain natriuretic peptide (BNP) level   Assessment & Plan    BNP 8000s on arrival.   TTE showed normal diastolic function & EF 70%.  Fluid status stable.            Elevated troponin- (present on admission)   Assessment & Plan    No CP or hx of CAD.  Normal echocardiogram.  Repeat trended down.         Autonomic neuropathy- (present on admission)   Assessment & Plan    On midodrine and hydrocortisone          Anorexia- (present on admission)   Assessment & Plan    Encourage oral intake.  Appropriate caloric intake discussed with patient and family.  She has required a PEG in the past.  Dietician following.        Aspiration pneumonia (HCC)- (present on admission)   Assessment & Plan    Completed a course of Unasyn.  CT chest normal                Quality-Core Measures   Reviewed items::  Medications reviewed  Bishop catheter::  No Bishop  DVT prophylaxis pharmacological::  Enoxaparin (Lovenox)  Ulcer Prophylaxis::  Not indicated  Assessed for rehabilitation services:  Patient was assess for and/or received rehabilitation services during this hospitalization

## 2018-05-05 NOTE — PROGRESS NOTES
Pt. Is AAOx4  Pt. Moves all extremeties,  Denies numbness and tingling  Complains of generalized pain  Prn oxycodone given for pain  Pt. 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

## 2018-05-05 NOTE — PROGRESS NOTES
Pt resting at this time. No events overnight. Up to the bathroom couple times, no episodes of dizziness or passing out. Slept well. Using 2L of oxygen as needed. Will monitor.

## 2018-05-05 NOTE — CARE PLAN
Problem: Safety  Goal: Will remain free from injury  Outcome: PROGRESSING AS EXPECTED  Bed alarm on. Call light and personal items within reach.    Problem: Respiratory:  Goal: Respiratory status will improve  Outcome: PROGRESSING AS EXPECTED  On 2 liters of oxygen as needed. She maintains sats in the low 90s on room air. Continues on continuous pulse ox.

## 2018-05-05 NOTE — CARE PLAN
Problem: Venous Thromboembolism (VTW)/Deep Vein Thrombosis (DVT) Prevention:  Goal: Patient will participate in Venous Thrombosis (VTE)/Deep Vein Thrombosis (DVT)Prevention Measures    Intervention: Ensure patient wears graduated elastic stockings (JESSICA hose) and/or SCDs, if ordered, when in bed or chair (Remove at least once per shift for skin check)  Pt. Refusing SCD's at this time but pt. Is agreeable to taking aspirin.       Problem: Skin Integrity  Goal: Risk for impaired skin integrity will decrease    Intervention: Assess risk factors for impaired skin integrity and/or pressure ulcers  Pt. Turning self from side to side.

## 2018-05-06 LAB
ERYTHROCYTE [DISTWIDTH] IN BLOOD BY AUTOMATED COUNT: 45 FL (ref 35.9–50)
HCT VFR BLD AUTO: 43.2 % (ref 37–47)
HGB BLD-MCNC: 14.3 G/DL (ref 12–16)
MCH RBC QN AUTO: 29.7 PG (ref 27–33)
MCHC RBC AUTO-ENTMCNC: 33.1 G/DL (ref 33.6–35)
MCV RBC AUTO: 89.8 FL (ref 81.4–97.8)
PLATELET # BLD AUTO: 220 K/UL (ref 164–446)
PMV BLD AUTO: 10.9 FL (ref 9–12.9)
RBC # BLD AUTO: 4.81 M/UL (ref 4.2–5.4)
WBC # BLD AUTO: 19.8 K/UL (ref 4.8–10.8)

## 2018-05-06 PROCEDURE — 99232 SBSQ HOSP IP/OBS MODERATE 35: CPT | Performed by: HOSPITALIST

## 2018-05-06 PROCEDURE — 700102 HCHG RX REV CODE 250 W/ 637 OVERRIDE(OP): Performed by: HOSPITALIST

## 2018-05-06 PROCEDURE — A9270 NON-COVERED ITEM OR SERVICE: HCPCS | Performed by: INTERNAL MEDICINE

## 2018-05-06 PROCEDURE — 700102 HCHG RX REV CODE 250 W/ 637 OVERRIDE(OP): Performed by: NURSE PRACTITIONER

## 2018-05-06 PROCEDURE — A9270 NON-COVERED ITEM OR SERVICE: HCPCS | Performed by: HOSPITALIST

## 2018-05-06 PROCEDURE — 85027 COMPLETE CBC AUTOMATED: CPT

## 2018-05-06 PROCEDURE — A9270 NON-COVERED ITEM OR SERVICE: HCPCS | Performed by: NURSE PRACTITIONER

## 2018-05-06 PROCEDURE — 770006 HCHG ROOM/CARE - MED/SURG/GYN SEMI*

## 2018-05-06 PROCEDURE — 700102 HCHG RX REV CODE 250 W/ 637 OVERRIDE(OP): Performed by: INTERNAL MEDICINE

## 2018-05-06 PROCEDURE — 700111 HCHG RX REV CODE 636 W/ 250 OVERRIDE (IP): Performed by: HOSPITALIST

## 2018-05-06 PROCEDURE — 36415 COLL VENOUS BLD VENIPUNCTURE: CPT

## 2018-05-06 RX ADMIN — MIDODRINE HYDROCHLORIDE 10 MG: 5 TABLET ORAL at 12:22

## 2018-05-06 RX ADMIN — HYDROCORTISONE 40 MG: 20 TABLET ORAL at 20:00

## 2018-05-06 RX ADMIN — ACETAMINOPHEN 650 MG: 325 TABLET, FILM COATED ORAL at 12:21

## 2018-05-06 RX ADMIN — MIDODRINE HYDROCHLORIDE 10 MG: 5 TABLET ORAL at 11:03

## 2018-05-06 RX ADMIN — LEVETIRACETAM 500 MG: 500 TABLET, FILM COATED ORAL at 11:04

## 2018-05-06 RX ADMIN — OXYCODONE HYDROCHLORIDE 10 MG: 10 TABLET ORAL at 20:01

## 2018-05-06 RX ADMIN — STANDARDIZED SENNA CONCENTRATE AND DOCUSATE SODIUM 2 TABLET: 8.6; 5 TABLET, FILM COATED ORAL at 20:01

## 2018-05-06 RX ADMIN — HYDROCORTISONE 40 MG: 20 TABLET ORAL at 11:02

## 2018-05-06 RX ADMIN — HYDROCORTISONE 40 MG: 20 TABLET ORAL at 16:03

## 2018-05-06 RX ADMIN — PREGABALIN 150 MG: 75 CAPSULE ORAL at 11:04

## 2018-05-06 RX ADMIN — ENOXAPARIN SODIUM 40 MG: 100 INJECTION SUBCUTANEOUS at 11:05

## 2018-05-06 RX ADMIN — ASPIRIN 81 MG: 81 TABLET, CHEWABLE ORAL at 11:04

## 2018-05-06 RX ADMIN — OXYCODONE HYDROCHLORIDE 10 MG: 10 TABLET ORAL at 11:04

## 2018-05-06 RX ADMIN — MIDODRINE HYDROCHLORIDE 10 MG: 5 TABLET ORAL at 16:03

## 2018-05-06 RX ADMIN — LEVETIRACETAM 500 MG: 500 TABLET, FILM COATED ORAL at 20:01

## 2018-05-06 RX ADMIN — OXYCODONE HYDROCHLORIDE 5 MG: 5 TABLET ORAL at 16:03

## 2018-05-06 RX ADMIN — MIDODRINE HYDROCHLORIDE 10 MG: 5 TABLET ORAL at 20:01

## 2018-05-06 RX ADMIN — PREGABALIN 150 MG: 75 CAPSULE ORAL at 20:01

## 2018-05-06 ASSESSMENT — PAIN SCALES - GENERAL
PAINLEVEL_OUTOF10: 9
PAINLEVEL_OUTOF10: 6
PAINLEVEL_OUTOF10: 5
PAINLEVEL_OUTOF10: 6
PAINLEVEL_OUTOF10: 8
PAINLEVEL_OUTOF10: 3
PAINLEVEL_OUTOF10: 5

## 2018-05-06 ASSESSMENT — ENCOUNTER SYMPTOMS
SORE THROAT: 0
WEAKNESS: 1
DIZZINESS: 0
VOMITING: 0
HEADACHES: 0
CHILLS: 0
HEARTBURN: 0
DEPRESSION: 0
NAUSEA: 0
DOUBLE VISION: 0
BLURRED VISION: 0
TINGLING: 0
SHORTNESS OF BREATH: 0
FEVER: 0
SENSORY CHANGE: 0
ABDOMINAL PAIN: 0
FOCAL WEAKNESS: 0
FALLS: 0
CONSTIPATION: 0
TREMORS: 0

## 2018-05-06 NOTE — CARE PLAN
Problem: Respiratory:  Goal: Respiratory status will improve    Intervention: Administer and titrate oxygen therapy  Pt on 2L O2 as needed.      Problem: Mobility  Goal: Risk for activity intolerance will decrease    Intervention: Encourage patient to increase activity level in collaboration with Interdisciplinary Team  Pt ambulating with 1 assistance to SBA. At risk for orthostatic hypotension and falls.

## 2018-05-06 NOTE — PROGRESS NOTES
Pt resting in bed. No events overnight. Maintains sats above 90 percent on room. Uses 2 liters of oxygen prn. Pain meds administered for left side/flank, arm and leg pain. Cooperative with cares. Will monitor.

## 2018-05-06 NOTE — PROGRESS NOTES
Pt was ambulating to bathroom with CNA when she became weak and was assisted to a sitting position on the floor. CNA called for assistance and, upon assessment, pt was breathing, had her eyes half open, but did not follow commands. Pt became responsive after a minute, was assisted back to bed where she became unresponsive again. In bed, pt presented with moaning and gurgling, pupils were equal and constricted 1mm, non reactive to light. This episode lasted for less than a minute. Pt was on 2 LO2 throughout incident, vital signs were WNL.

## 2018-05-06 NOTE — PROGRESS NOTES
[facility]ist Progress Note    Date of Service: 2018    Chief Complaint  53 y.o. female admitted 4/10/2018 with ams.  Found to have multiple acute CVAs on MRI.  New onset seizure disorder.    Interval Problem Update  Patient had a short episode of unresponsiveness while ambulating to the bathroom this am.  Likely secondary to orthostatic hypotension which appears to be a chronic issue.  She is stable at this time and back to her baseline mentation.  Denies pain, lightheadedness, or dizziness.    Consultants/Specialty  Neurology s/o    Disposition  Plan for snf at d/c.   following.        Review of Systems   Constitutional: Negative for chills and fever.   HENT: Negative for congestion and sore throat.    Eyes: Negative for blurred vision and double vision.   Respiratory: Negative for shortness of breath.    Cardiovascular: Negative for chest pain and leg swelling.   Gastrointestinal: Negative for abdominal pain, constipation, heartburn, nausea and vomiting.   Genitourinary: Negative for dysuria.   Musculoskeletal: Negative for falls and joint pain.   Skin: Negative for rash.   Neurological: Positive for weakness. Negative for dizziness, tingling, tremors, sensory change, focal weakness and headaches.   Psychiatric/Behavioral: Negative for depression.      Physical Exam  Laboratory/Imaging   Hemodynamics  Temp (24hrs), Av.6 °C (97.8 °F), Min:35.9 °C (96.6 °F), Max:37 °C (98.6 °F)   Temperature: 35.9 °C (96.6 °F)  Pulse  Av.4  Min: 12  Max: 157    Blood Pressure: 124/78     Respiratory      Respiration: 16, Pulse Oximetry: 100 %     Work Of Breathing / Effort: Mild  RUL Breath Sounds: Clear, RML Breath Sounds: Clear, RLL Breath Sounds: Clear, JOLYNN Breath Sounds: Clear, LLL Breath Sounds: Clear    Fluids    Intake/Output Summary (Last 24 hours) at 18 1017  Last data filed at 18 0631   Gross per 24 hour   Intake              450 ml   Output                0 ml   Net              450 ml        Nutrition  Orders Placed This Encounter   Procedures   • DIET ORDER     Standing Status:   Standing     Number of Occurrences:   1     Order Specific Question:   Diet:     Answer:   Regular [1]     Order Specific Question:   Texture/Fiber modifications:     Answer:   Dysphagia 3(Mechanical Soft)specify fluid consistency(question 6) [3]     Order Specific Question:   Consistency/Fluid modifications:     Answer:   Thin Liquids [3]     Physical Exam   Constitutional: She appears well-developed.   Thin white female   HENT:   Head: Normocephalic and atraumatic.   Eyes: Conjunctivae are normal. Pupils are equal, round, and reactive to light. No scleral icterus.   Neck: Neck supple. No JVD present.   Cardiovascular: Normal rate, regular rhythm, normal heart sounds and intact distal pulses.  Exam reveals no friction rub.    No murmur heard.  Pulmonary/Chest: Effort normal and breath sounds normal. No stridor. No respiratory distress. She has no wheezes.   Abdominal: Soft. Bowel sounds are normal. She exhibits no distension. There is no tenderness.   Musculoskeletal: Normal range of motion. She exhibits no edema.   Neurological: She is alert. Coordination normal.   Oriented x 2.  Forgetful.   Skin: Skin is warm and dry. She is not diaphoretic. No erythema.       Recent Labs      05/05/18   0233  05/06/18   0321   WBC  20.4*  19.8*   RBC  4.83  4.81   HEMOGLOBIN  14.3  14.3   HEMATOCRIT  43.4  43.2   MCV  89.9  89.8   MCH  29.6  29.7   MCHC  32.9*  33.1*   RDW  44.5  45.0   PLATELETCT  238  220   MPV  10.7  10.9                          Assessment/Plan     * Acute CVA (cerebrovascular accident) (HCC)   Assessment & Plan    Multifocal ischemic infarcts  Neurology s/o   Carotid US normal.  On aspirin  LACI w bubble study showed PFO.  Patient wishing to treat medically at this time.  Anticipate loop recorder placement prior to d/c to r/o embolic origin.  She passed her swallow eval and has been tolerating a  diet.  Anticipate need for SNF at d/c.    MRI Brain:  1.  Multifocal areas of acute infarction with the largest area of infarction located superiorly in the left frontal lobe extending to the cortex. Much smaller areas of acute infarction are noted in the left posterior occipital lobe, right   parietal-occipital cortex, and right hemicerebellum.    2.  Mild periventricular and juxtacortical white matter changes consistent with chronic microvascular ischemic gliosis            Acute respiratory failure with hypoxia (HCC)- (present on admission)   Assessment & Plan    Likely secondary to seizure.  Requiring intubation on 4/26 and extubation on 4/27.  Pulmonology s/o  No post extubation complications        Hypotension- (present on admission)   Assessment & Plan    Had an short episode of unresponsiveness while ambulating the bathroom this am.    Likely secondary to orthostatic hypotension.  This is a chronic issue.  BP overall improved.  Dizziness while ambulating much improved.  Continue midodrine and hydrocortisone.            Seizure disorder as sequela of cerebrovascular accident (HCC)- (present on admission)   Assessment & Plan    Neurology following.  Continuous EEG 4/26 consistent with non convulsive seizures.  No further seizure activity with increased dose of keppra.            Drug overdose - unintentional- (present on admission)   Assessment & Plan    Buprenorphine patch removed PTA.   Patient was on high doses of narcotics at home for chronic pain and fibromyalgia  Conservative use of narcotics.                 Weakness   Assessment & Plan    Secondary to above.  PT/OT following.   Pending SNF placement.        Normocytic anemia- (present on admission)   Assessment & Plan    Stable.        Chronic pain syndrome- (present on admission)   Assessment & Plan    Pain controlled.  Conservative use of narcotics due to possible unintentional narcotics overdose.          Campylobacter diarrhea- (present on  admission)   Assessment & Plan    Campylobacter on cultures from transferring facility. C-diff negative here on 4/10/18.            Hyperglycemia   Assessment & Plan    A1C normal at 5.4. No hx of Dm.  This likely steroid induced.   Monitor blood glucoses.            Leukocytosis- (present on admission)   Assessment & Plan    WBC has remained elevated with elevated polys and low lymphs    Completed Unasyn and Azithro 4/16.  Procalcitonin was normal.  Clinically with no signs with active infection.   Likely secondary to steroids.          Elevated brain natriuretic peptide (BNP) level   Assessment & Plan    BNP 8000s on arrival.   TTE showed normal diastolic function & EF 70%.  Fluid status stable.            Elevated troponin- (present on admission)   Assessment & Plan    No CP or hx of CAD.  Normal echocardiogram.  Repeat trended down.         Autonomic neuropathy- (present on admission)   Assessment & Plan    On midodrine and hydrocortisone          Anorexia- (present on admission)   Assessment & Plan    Encourage oral intake.  Appropriate caloric intake discussed with patient and family.  She has required a PEG in the past.  Dietician following.        Aspiration pneumonia (HCC)- (present on admission)   Assessment & Plan    Completed a course of Unasyn.  CT chest normal                Quality-Core Measures   Reviewed items::  Medications reviewed  Bishop catheter::  No Bishop  DVT prophylaxis pharmacological::  Enoxaparin (Lovenox)  Ulcer Prophylaxis::  Not indicated  Assessed for rehabilitation services:  Patient was assess for and/or received rehabilitation services during this hospitalization

## 2018-05-06 NOTE — PROGRESS NOTES
Abdominal binder was delivered to patient.  If any further assistance needed, please call extension [phone] or place order for Ortho Technician assistance as a communication order in [employer].

## 2018-05-06 NOTE — CARE PLAN
Problem: Respiratory:  Goal: Respiratory status will improve  Outcome: PROGRESSING AS EXPECTED  On continuous pulse ox, sats within normal limits. Advised on breathing techniques, voiced understanding.

## 2018-05-06 NOTE — CARE PLAN
Problem: Safety  Goal: Will remain free from injury  Outcome: PROGRESSING AS EXPECTED  Pt calls for help. Bed alarm on. Staff monitoring frequently.

## 2018-05-07 PROCEDURE — A9270 NON-COVERED ITEM OR SERVICE: HCPCS | Performed by: HOSPITALIST

## 2018-05-07 PROCEDURE — A9270 NON-COVERED ITEM OR SERVICE: HCPCS | Performed by: NURSE PRACTITIONER

## 2018-05-07 PROCEDURE — 700102 HCHG RX REV CODE 250 W/ 637 OVERRIDE(OP): Performed by: NURSE PRACTITIONER

## 2018-05-07 PROCEDURE — 700102 HCHG RX REV CODE 250 W/ 637 OVERRIDE(OP): Performed by: INTERNAL MEDICINE

## 2018-05-07 PROCEDURE — A9270 NON-COVERED ITEM OR SERVICE: HCPCS | Performed by: INTERNAL MEDICINE

## 2018-05-07 PROCEDURE — 770006 HCHG ROOM/CARE - MED/SURG/GYN SEMI*

## 2018-05-07 PROCEDURE — 700111 HCHG RX REV CODE 636 W/ 250 OVERRIDE (IP): Performed by: HOSPITALIST

## 2018-05-07 PROCEDURE — 700102 HCHG RX REV CODE 250 W/ 637 OVERRIDE(OP): Performed by: HOSPITALIST

## 2018-05-07 PROCEDURE — 99232 SBSQ HOSP IP/OBS MODERATE 35: CPT | Performed by: HOSPITALIST

## 2018-05-07 RX ORDER — BUTALBITAL, ACETAMINOPHEN AND CAFFEINE 50; 325; 40 MG/1; MG/1; MG/1
1 TABLET ORAL ONCE
Status: COMPLETED | OUTPATIENT
Start: 2018-05-07 | End: 2018-05-07

## 2018-05-07 RX ADMIN — ACETAMINOPHEN 650 MG: 325 TABLET, FILM COATED ORAL at 07:31

## 2018-05-07 RX ADMIN — OXYCODONE HYDROCHLORIDE 5 MG: 5 TABLET ORAL at 13:40

## 2018-05-07 RX ADMIN — MIDODRINE HYDROCHLORIDE 10 MG: 5 TABLET ORAL at 13:40

## 2018-05-07 RX ADMIN — HYDROCORTISONE 40 MG: 20 TABLET ORAL at 07:31

## 2018-05-07 RX ADMIN — OXYCODONE HYDROCHLORIDE 10 MG: 10 TABLET ORAL at 18:00

## 2018-05-07 RX ADMIN — MIDODRINE HYDROCHLORIDE 10 MG: 5 TABLET ORAL at 20:04

## 2018-05-07 RX ADMIN — HYDROCORTISONE 40 MG: 20 TABLET ORAL at 13:39

## 2018-05-07 RX ADMIN — PREGABALIN 150 MG: 75 CAPSULE ORAL at 07:31

## 2018-05-07 RX ADMIN — OXYCODONE HYDROCHLORIDE 10 MG: 10 TABLET ORAL at 04:28

## 2018-05-07 RX ADMIN — MIDODRINE HYDROCHLORIDE 10 MG: 5 TABLET ORAL at 07:31

## 2018-05-07 RX ADMIN — MIDODRINE HYDROCHLORIDE 10 MG: 5 TABLET ORAL at 16:23

## 2018-05-07 RX ADMIN — PREGABALIN 150 MG: 75 CAPSULE ORAL at 20:04

## 2018-05-07 RX ADMIN — HYDROCORTISONE 40 MG: 20 TABLET ORAL at 20:04

## 2018-05-07 RX ADMIN — ENOXAPARIN SODIUM 40 MG: 100 INJECTION SUBCUTANEOUS at 07:30

## 2018-05-07 RX ADMIN — ASPIRIN 81 MG: 81 TABLET, CHEWABLE ORAL at 07:31

## 2018-05-07 RX ADMIN — LEVETIRACETAM 500 MG: 500 TABLET, FILM COATED ORAL at 07:31

## 2018-05-07 RX ADMIN — BUTALBITAL, ACETAMINOPHEN, AND CAFFEINE 1 TABLET: 50; 325; 40 TABLET ORAL at 16:23

## 2018-05-07 RX ADMIN — LEVETIRACETAM 500 MG: 500 TABLET, FILM COATED ORAL at 20:04

## 2018-05-07 ASSESSMENT — ENCOUNTER SYMPTOMS
DIARRHEA: 0
DIZZINESS: 0
PALPITATIONS: 0
TREMORS: 0
SHORTNESS OF BREATH: 0
HEARTBURN: 0
COUGH: 0
SENSORY CHANGE: 0
ABDOMINAL PAIN: 0
TINGLING: 0
BLURRED VISION: 0
CHILLS: 0
NAUSEA: 0
DEPRESSION: 0
CONSTIPATION: 0
HEADACHES: 0
VOMITING: 0
SPEECH CHANGE: 0
FOCAL WEAKNESS: 0
FEVER: 0
WEAKNESS: 1

## 2018-05-07 ASSESSMENT — PATIENT HEALTH QUESTIONNAIRE - PHQ9
1. LITTLE INTEREST OR PLEASURE IN DOING THINGS: NOT AT ALL
SUM OF ALL RESPONSES TO PHQ9 QUESTIONS 1 AND 2: 0
2. FEELING DOWN, DEPRESSED, IRRITABLE, OR HOPELESS: NOT AT ALL

## 2018-05-07 ASSESSMENT — PAIN SCALES - GENERAL
PAINLEVEL_OUTOF10: 4
PAINLEVEL_OUTOF10: 8
PAINLEVEL_OUTOF10: 6
PAINLEVEL_OUTOF10: 8
PAINLEVEL_OUTOF10: 4
PAINLEVEL_OUTOF10: 5
PAINLEVEL_OUTOF10: 8
PAINLEVEL_OUTOF10: 7

## 2018-05-07 NOTE — PROGRESS NOTES
Pt resting in bed at this time. States left arm, flank and leg pain has not improved much. Also wants her lyrica to be adjusted to her home dose and frequency. On room air at this time. Sats within normal limits. Will monitor closely.

## 2018-05-07 NOTE — PROGRESS NOTES
Pt alert and oriented x4. Medicated per MAR for pain. Tolerating dysphagia 3 diet Bed alarm on ,call light w/in reach, hourly rounding in place. Bharath hose ordered for pt. Abdominal binder ordered per MD for pt to wear when out of bed and ambulating. Bed alarm on, call light w/in reach, hourly rounding in place.

## 2018-05-07 NOTE — PROGRESS NOTES
Pain med administered. She has been using bedside commode. Refused noemi hose. States she will put them on today.

## 2018-05-07 NOTE — PROGRESS NOTES
[facility]ist Progress Note    Date of Service: 2018    Chief Complaint  53 y.o. female admitted 4/10/2018 with ams.  Found to have multiple acute CVAs on MRI.  New onset seizure disorder.    Interval Problem Update  No acute events overnight.  More alert today.  BP stable.  Encouraged to ambulate daily.  Denies pain.  Anxious to get to rehab facility.      Consultants/Specialty  Neurology s/o    Disposition  Plan for snf at d/c.   following.        Review of Systems   Constitutional: Negative for chills, fever and malaise/fatigue.   HENT: Negative for congestion.    Eyes: Negative for blurred vision.   Respiratory: Negative for cough and shortness of breath.    Cardiovascular: Negative for chest pain, palpitations and leg swelling.   Gastrointestinal: Negative for abdominal pain, constipation, diarrhea, heartburn, nausea and vomiting.   Genitourinary: Negative for dysuria and urgency.   Musculoskeletal: Negative for joint pain.   Skin: Negative for itching and rash.   Neurological: Positive for weakness. Negative for dizziness, tingling, tremors, sensory change, speech change, focal weakness and headaches.   Psychiatric/Behavioral: Negative for depression.      Physical Exam  Laboratory/Imaging   Hemodynamics  Temp (24hrs), Av.7 °C (98 °F), Min:36.5 °C (97.7 °F), Max:36.8 °C (98.2 °F)   Temperature: 36.7 °C (98.1 °F)  Pulse  Av  Min: 12  Max: 157    Blood Pressure: 104/67     Respiratory      Respiration: 16, Pulse Oximetry: 100 %     Work Of Breathing / Effort: Mild  RUL Breath Sounds: Clear, RML Breath Sounds: Clear, RLL Breath Sounds: Clear, JOLYNN Breath Sounds: Clear, LLL Breath Sounds: Clear    Fluids    Intake/Output Summary (Last 24 hours) at 18 1021  Last data filed at 18 0517   Gross per 24 hour   Intake              625 ml   Output              400 ml   Net              225 ml       Nutrition  Orders Placed This Encounter   Procedures   • DIET ORDER     Standing Status:    Standing     Number of Occurrences:   1     Order Specific Question:   Diet:     Answer:   Regular [1]     Order Specific Question:   Texture/Fiber modifications:     Answer:   Dysphagia 3(Mechanical Soft)specify fluid consistency(question 6) [3]     Order Specific Question:   Consistency/Fluid modifications:     Answer:   Thin Liquids [3]     Physical Exam   Constitutional: She appears well-developed. No distress.   Thin white female   HENT:   Head: Normocephalic and atraumatic.   Mouth/Throat: No oropharyngeal exudate.   Eyes: Conjunctivae are normal. Pupils are equal, round, and reactive to light. Right eye exhibits no discharge. Left eye exhibits no discharge.   Neck: Neck supple. No tracheal deviation present.   Cardiovascular: Normal rate, regular rhythm, normal heart sounds and intact distal pulses.    No murmur heard.  Pulmonary/Chest: Effort normal and breath sounds normal. No stridor. No respiratory distress. She has no wheezes. She has no rales.   Abdominal: Soft. Bowel sounds are normal. She exhibits no distension. There is no tenderness. There is no rebound.   Musculoskeletal: Normal range of motion. She exhibits no edema or deformity.   Neurological: She is alert. She exhibits normal muscle tone. Coordination normal.   Oriented x 2.  Forgetful.   Skin: Skin is warm and dry. No rash noted. She is not diaphoretic. No erythema.       Recent Labs      05/05/18   0233  05/06/18   0321   WBC  20.4*  19.8*   RBC  4.83  4.81   HEMOGLOBIN  14.3  14.3   HEMATOCRIT  43.4  43.2   MCV  89.9  89.8   MCH  29.6  29.7   MCHC  32.9*  33.1*   RDW  44.5  45.0   PLATELETCT  238  220   MPV  10.7  10.9                          Assessment/Plan     * Acute CVA (cerebrovascular accident) (HCC)   Assessment & Plan    Multifocal ischemic infarcts  Neurology s/o   Carotid US normal.  On aspirin  LACI w bubble study showed PFO.  Patient wishing to treat medically at this time.  Anticipate loop recorder placement prior to d/c to r/o  embolic origin.  She passed her swallow eval and has been tolerating a diet.  Anticipate need for SNF at d/c.    MRI Brain:  1.  Multifocal areas of acute infarction with the largest area of infarction located superiorly in the left frontal lobe extending to the cortex. Much smaller areas of acute infarction are noted in the left posterior occipital lobe, right   parietal-occipital cortex, and right hemicerebellum.    2.  Mild periventricular and juxtacortical white matter changes consistent with chronic microvascular ischemic gliosis            Acute respiratory failure with hypoxia (HCC)- (present on admission)   Assessment & Plan    Likely secondary to seizure.  Requiring intubation on 4/26 and extubation on 4/27.  Pulmonology s/o  No post extubation complications        Hypotension- (present on admission)   Assessment & Plan    Has had intermittent episodes of unresponsiveness while ambulating secondary to orthostatic hypotension.   This is a chronic issue.  BP overall improved.  Continue compression stockings and abdominal binder while ambulating.  Continue midodrine and hydrocortisone.            Seizure disorder as sequela of cerebrovascular accident (HCC)- (present on admission)   Assessment & Plan    Neurology following.  Continuous EEG 4/26 consistent with non convulsive seizures.  No further seizure activity with increased dose of keppra.            Drug overdose - unintentional- (present on admission)   Assessment & Plan    Buprenorphine patch removed PTA.   Patient was on high doses of narcotics at home for chronic pain and fibromyalgia  Conservative use of narcotics.                 Weakness   Assessment & Plan    Secondary to above.  PT/OT following.   Pending SNF placement.        Normocytic anemia- (present on admission)   Assessment & Plan    Stable.        Chronic pain syndrome- (present on admission)   Assessment & Plan    Pain controlled.  Conservative use of narcotics due to possible  unintentional narcotics overdose.          Campylobacter diarrhea- (present on admission)   Assessment & Plan    Campylobacter on cultures from transferring facility. C-diff negative here on 4/10/18.  Resolved.            Hyperglycemia   Assessment & Plan    A1C normal at 5.4. No hx of Dm.  This likely steroid induced.   Monitor blood glucoses.            Leukocytosis- (present on admission)   Assessment & Plan    WBC has remained elevated with elevated polys and low lymphs    Completed Unasyn and Azithro 4/16.  Procalcitonin was normal.  Clinically with no signs with active infection.   Likely secondary to steroids.          Elevated brain natriuretic peptide (BNP) level   Assessment & Plan    BNP 8000s on arrival.   TTE showed normal diastolic function & EF 70%.  Fluid status stable.            Elevated troponin- (present on admission)   Assessment & Plan    No CP or hx of CAD.  Normal echocardiogram.  Repeat trended down.         Autonomic neuropathy- (present on admission)   Assessment & Plan    On midodrine and hydrocortisone          Anorexia- (present on admission)   Assessment & Plan    Encourage oral intake.  Appropriate caloric intake discussed with patient and family.  She has required a PEG in the past.  Dietician following.        Aspiration pneumonia (HCC)- (present on admission)   Assessment & Plan    Completed a course of Unasyn.  CT chest normal                Quality-Core Measures   Reviewed items::  Medications reviewed  Bishop catheter::  No Bishop  DVT prophylaxis pharmacological::  Enoxaparin (Lovenox)  Ulcer Prophylaxis::  Not indicated  Assessed for rehabilitation services:  Patient was assess for and/or received rehabilitation services during this hospitalization

## 2018-05-07 NOTE — CARE PLAN
Progressing as expected:     Problem: Respiratory:  Goal: Respiratory status will improve    Intervention: Administer and titrate oxygen therapy  Pt on 2L O2 as needed.      Problem: Mobility  Goal: Risk for activity intolerance will decrease    Intervention: Encourage patient to increase activity level in collaboration with Interdisciplinary Team  Pt ambulating with 1 assistance to SBA. At risk for orthostatic hypotension and falls.

## 2018-05-07 NOTE — CARE PLAN
Problem: Pain Management  Goal: Pain level will decrease to patient's comfort goal  Outcome: PROGRESSING SLOWER THAN EXPECTED  States pain is fairly controlled. Pain med availability discussed with pt.    Problem: Respiratory:  Goal: Respiratory status will improve  Outcome: PROGRESSING AS EXPECTED  On continuous pulse oximetry. Uses oxygen as needed.

## 2018-05-07 NOTE — PROGRESS NOTES
Pt alert and oriented x4. Medicated per MAR for pain. Tolerating dysphagia 3 diet, Bed alarm on ,call light w/in reach, hourly rounding in place. Bharath hose at bedside; pt refused to put them on last night, will try again today. Abdominal binder ordered per MD for pt to wear when out of bed and ambulating. Bed alarm on, call light w/in reach, hourly rounding in place.

## 2018-05-08 PROCEDURE — 92507 TX SP LANG VOICE COMM INDIV: CPT

## 2018-05-08 PROCEDURE — 700102 HCHG RX REV CODE 250 W/ 637 OVERRIDE(OP): Performed by: INTERNAL MEDICINE

## 2018-05-08 PROCEDURE — 99232 SBSQ HOSP IP/OBS MODERATE 35: CPT | Performed by: HOSPITALIST

## 2018-05-08 PROCEDURE — A9270 NON-COVERED ITEM OR SERVICE: HCPCS | Performed by: INTERNAL MEDICINE

## 2018-05-08 PROCEDURE — 700102 HCHG RX REV CODE 250 W/ 637 OVERRIDE(OP): Performed by: HOSPITALIST

## 2018-05-08 PROCEDURE — 97530 THERAPEUTIC ACTIVITIES: CPT

## 2018-05-08 PROCEDURE — 770006 HCHG ROOM/CARE - MED/SURG/GYN SEMI*

## 2018-05-08 PROCEDURE — A9270 NON-COVERED ITEM OR SERVICE: HCPCS | Performed by: NURSE PRACTITIONER

## 2018-05-08 PROCEDURE — G8980 MOBILITY D/C STATUS: HCPCS | Mod: CI

## 2018-05-08 PROCEDURE — 700111 HCHG RX REV CODE 636 W/ 250 OVERRIDE (IP): Performed by: HOSPITALIST

## 2018-05-08 PROCEDURE — 97116 GAIT TRAINING THERAPY: CPT

## 2018-05-08 PROCEDURE — 700102 HCHG RX REV CODE 250 W/ 637 OVERRIDE(OP): Performed by: NURSE PRACTITIONER

## 2018-05-08 PROCEDURE — G8979 MOBILITY GOAL STATUS: HCPCS | Mod: CI

## 2018-05-08 PROCEDURE — A9270 NON-COVERED ITEM OR SERVICE: HCPCS | Performed by: HOSPITALIST

## 2018-05-08 PROCEDURE — 92526 ORAL FUNCTION THERAPY: CPT

## 2018-05-08 RX ADMIN — HYDROCORTISONE 40 MG: 20 TABLET ORAL at 20:41

## 2018-05-08 RX ADMIN — ACETAMINOPHEN 650 MG: 325 TABLET, FILM COATED ORAL at 16:42

## 2018-05-08 RX ADMIN — LEVETIRACETAM 500 MG: 500 TABLET, FILM COATED ORAL at 20:06

## 2018-05-08 RX ADMIN — MICONAZOLE NITRATE: 20 CREAM TOPICAL at 20:41

## 2018-05-08 RX ADMIN — MIDODRINE HYDROCHLORIDE 10 MG: 5 TABLET ORAL at 08:26

## 2018-05-08 RX ADMIN — MIDODRINE HYDROCHLORIDE 10 MG: 5 TABLET ORAL at 13:32

## 2018-05-08 RX ADMIN — LEVETIRACETAM 500 MG: 500 TABLET, FILM COATED ORAL at 08:26

## 2018-05-08 RX ADMIN — MIDODRINE HYDROCHLORIDE 10 MG: 5 TABLET ORAL at 16:42

## 2018-05-08 RX ADMIN — ACETAMINOPHEN 650 MG: 325 TABLET, FILM COATED ORAL at 08:26

## 2018-05-08 RX ADMIN — PREGABALIN 150 MG: 75 CAPSULE ORAL at 08:26

## 2018-05-08 RX ADMIN — STANDARDIZED SENNA CONCENTRATE AND DOCUSATE SODIUM 2 TABLET: 8.6; 5 TABLET, FILM COATED ORAL at 20:07

## 2018-05-08 RX ADMIN — OXYCODONE HYDROCHLORIDE 10 MG: 10 TABLET ORAL at 22:18

## 2018-05-08 RX ADMIN — MIDODRINE HYDROCHLORIDE 10 MG: 5 TABLET ORAL at 20:07

## 2018-05-08 RX ADMIN — ASPIRIN 81 MG: 81 TABLET, CHEWABLE ORAL at 08:26

## 2018-05-08 RX ADMIN — ENOXAPARIN SODIUM 40 MG: 100 INJECTION SUBCUTANEOUS at 08:26

## 2018-05-08 RX ADMIN — PREGABALIN 150 MG: 75 CAPSULE ORAL at 20:07

## 2018-05-08 RX ADMIN — HYDROCORTISONE 40 MG: 20 TABLET ORAL at 08:26

## 2018-05-08 RX ADMIN — HYDROCORTISONE 40 MG: 20 TABLET ORAL at 13:32

## 2018-05-08 RX ADMIN — OXYCODONE HYDROCHLORIDE 10 MG: 10 TABLET ORAL at 00:13

## 2018-05-08 RX ADMIN — OXYCODONE HYDROCHLORIDE 10 MG: 10 TABLET ORAL at 13:32

## 2018-05-08 ASSESSMENT — ENCOUNTER SYMPTOMS
CONSTITUTIONAL NEGATIVE: 1
EYES NEGATIVE: 1
NEUROLOGICAL NEGATIVE: 1
MUSCULOSKELETAL NEGATIVE: 1
GASTROINTESTINAL NEGATIVE: 1
CARDIOVASCULAR NEGATIVE: 1
RESPIRATORY NEGATIVE: 1

## 2018-05-08 ASSESSMENT — PAIN SCALES - GENERAL
PAINLEVEL_OUTOF10: 4
PAINLEVEL_OUTOF10: 7
PAINLEVEL_OUTOF10: 6
PAINLEVEL_OUTOF10: 7
PAINLEVEL_OUTOF10: 9
PAINLEVEL_OUTOF10: 6
PAINLEVEL_OUTOF10: 4
PAINLEVEL_OUTOF10: 4

## 2018-05-08 ASSESSMENT — COGNITIVE AND FUNCTIONAL STATUS - GENERAL
MOBILITY SCORE: 23
CLIMB 3 TO 5 STEPS WITH RAILING: A LITTLE
SUGGESTED CMS G CODE MODIFIER MOBILITY: CI

## 2018-05-08 ASSESSMENT — GAIT ASSESSMENTS
ASSISTIVE DEVICE: FRONT WHEEL WALKER
GAIT LEVEL OF ASSIST: SUPERVISED
DEVIATION: ANTALGIC;DECREASED BASE OF SUPPORT;BRADYKINETIC;SHUFFLED GAIT;DECREASED HEEL STRIKE;DECREASED TOE OFF
DISTANCE (FEET): 500

## 2018-05-08 NOTE — PROGRESS NOTES
Pt alert and oriented x4. Medicated per MAR for pain 4/10. Tolerating dysphagia 3 diet, Bed alarm on ,call light w/in reach, hourly rounding in place.Abdominal binder ordered per MD for pt to wear when out of bed and ambulating. Bed alarm on, call light w/in reach, hourly rounding in place.

## 2018-05-08 NOTE — CARE PLAN
Problem: Nutritional:  Goal: Achieve adequate nutritional intake  Patient will tolerate po diet with at least 50% intake of meals.   Outcome: MET Date Met: 05/08/18  Pt seen for PO intake follow up. Diet= Dysphagia 3, thin liquids with High pro Milkshake as snack once daily. Pt reports eating ~ 50% of meals and drinking 100% of high protein milkshake as after dinner snack. Pt happy with current diet regimen with no questions/concerns. No pressure ulcers noted per wound care on 4/11. Wt fluctuates but appears fairly stable over past ~two weeks. RD available PRN.

## 2018-05-08 NOTE — CARE PLAN
Problem: Urinary Elimination:  Goal: Ability to reestablish a normal urinary elimination pattern will improve  Outcome: PROGRESSING SLOWER THAN EXPECTED      Problem: Mobility  Goal: Risk for activity intolerance will decrease  Outcome: PROGRESSING SLOWER THAN EXPECTED

## 2018-05-08 NOTE — THERAPY
"Pt w/improved balance, gait, and activity tolerance. pt w/no s/s of low BP. Pt w/improved posture and endurance, requiring less cuing. Pt is at a fucntional level and is at new baseline. and should be able to return home w/HH and family support. Will discuss w/primary PT.    Physical Therapy Treatment completed.   Bed Mobility:  Supine to Sit: Modified Independent  Transfers: Sit to Stand: Modified Independent  Gait: Level Of Assist: Supervised with Front-Wheel Walker         See \"Rehab Therapy-Acute\" Patient Summary Report for complete documentation.       "

## 2018-05-08 NOTE — THERAPY
"Speech Language Therapy dysphagia treatment completed.   Functional Status:  Tolerating dys3/thins diet. Pt with fluent speech during conversation but still reporting occasional word finding difficulties in conversation. Pt able answer Y/N questions when reading short general sentences. When reading longer sentences asking open ended general questions she required min to mod cues to complete. She had limited insight into reading difficulties and often denied any difficulty even though her mistakes were pointed out. Reading comprehension tasks of easy to moderate difficulty left for pt to try and complete on own.   Recommendations: Continue current diet. Post acute SLP tx   Plan of Care: Will benefit from Speech Therapy 5 times per week  Post-Acute Therapy: Discharge to home with outpatient or home health for additional skilled therapy services.    See \"Rehab Therapy-Acute\" Patient Summary Report for complete documentation.     "

## 2018-05-08 NOTE — DISCHARGE PLANNING
[facility], [facility]. Voicemail left. Referral extended to [facility] and [facility] in [place], [place].

## 2018-05-08 NOTE — PROGRESS NOTES
[facility] Hospitalist Progress Note    Date of Service: 2018    Chief Complaint  53 y.o. female admitted 4/10/2018 with altered mental status who was found to have evidence of multiple acute strokes and new onset seizure disorder    Interval Problem Update  Doing well, no further seizure activity  Axox4  Denies pain    Consultants/Specialty  Neurology    Disposition  Awaiting snf acceptance in [facility]        Review of Systems   Constitutional: Negative.    HENT: Negative.    Eyes: Negative.    Respiratory: Negative.    Cardiovascular: Negative.    Gastrointestinal: Negative.    Genitourinary: Negative.    Musculoskeletal: Negative.    Skin: Negative.    Neurological: Negative.    Endo/Heme/Allergies: Negative.       Physical Exam  Laboratory/Imaging   Hemodynamics  Temp (24hrs), Av.7 °C (98.1 °F), Min:36.4 °C (97.6 °F), Max:37.1 °C (98.8 °F)   Temperature: 36.7 °C (98.1 °F)  Pulse  Av.8  Min: 12  Max: 157    Blood Pressure: 113/74     Respiratory      Respiration: 16, Pulse Oximetry: 99 %     Work Of Breathing / Effort: Mild  RUL Breath Sounds: Clear, RML Breath Sounds: Clear, RLL Breath Sounds: Clear, JOLYNN Breath Sounds: Clear, LLL Breath Sounds: Clear    Fluids  No intake or output data in the 24 hours ending 18 1023    Nutrition  Orders Placed This Encounter   Procedures   • DIET ORDER     Standing Status:   Standing     Number of Occurrences:   1     Order Specific Question:   Diet:     Answer:   Regular [1]     Order Specific Question:   Texture/Fiber modifications:     Answer:   Dysphagia 3(Mechanical Soft)specify fluid consistency(question 6) [3]     Order Specific Question:   Consistency/Fluid modifications:     Answer:   Thin Liquids [3]     Physical Exam   Constitutional: She is oriented to person, place, and time. She appears well-developed and well-nourished.   HENT:   Head: Normocephalic and atraumatic.   Eyes: Conjunctivae are normal. Right eye exhibits no discharge. Left eye exhibits no  discharge. No scleral icterus.   Neck: Normal range of motion. No JVD present.   Cardiovascular: Normal rate and regular rhythm.  Exam reveals friction rub. Exam reveals no gallop.    No murmur heard.  Pulmonary/Chest: Effort normal and breath sounds normal. Stridor present. No respiratory distress. She has no wheezes. She has no rales. She exhibits no tenderness.   Abdominal: Soft. Bowel sounds are normal. She exhibits no distension and no mass. There is no tenderness. There is no rebound and no guarding.   Musculoskeletal: Normal range of motion. She exhibits edema. She exhibits no deformity.   Lymphadenopathy:     She has no cervical adenopathy.   Neurological: She is alert and oriented to person, place, and time. She has normal reflexes. No cranial nerve deficit. Coordination normal.   Skin: Skin is warm and dry. No rash noted. No erythema. No pallor.   Psychiatric: She has a normal mood and affect. Her behavior is normal. Judgment and thought content normal.       Recent Labs      05/06/18   0321   WBC  19.8*   RBC  4.81   HEMOGLOBIN  14.3   HEMATOCRIT  43.2   MCV  89.8   MCH  29.7   MCHC  33.1*   RDW  45.0   PLATELETCT  220   MPV  10.9                          Assessment/Plan     * Acute CVA (cerebrovascular accident) (HCC)   Assessment & Plan    Multifocal ischemic infarcts  Neurology has signed off  Carotid US normal.  Continue aspirin  LACI w bubble study showed PFO.  Patient wishing to treat medically at this time.  Anticipate loop recorder placement prior to d/c to r/o embolic origin.  She passed her swallow eval and has been tolerating a diet.  Anticipate need for SNF at d/c.                Acute respiratory failure with hypoxia (HCC)- (present on admission)   Assessment & Plan    Likely secondary to seizure.  Required intubation on 4/26 and extubation on 4/27.  Pulmonology has signed off          Hypotension- (present on admission)   Assessment & Plan    Has had intermittent episodes of unresponsiveness  while ambulating secondary to orthostatic hypotension.   This is a chronic issue.  Much improved  No recent sx  Continue midodrine and hydrocortisone.            Seizure disorder as sequela of cerebrovascular accident (HCC)- (present on admission)   Assessment & Plan    Neurology has now signed off  Clinically stable with no signs of further seizure activity on current dose of keppra.            Drug overdose - unintentional- (present on admission)   Assessment & Plan    Buprenorphine patch removed PTA.   Patient was on high doses of narcotics at home for chronic pain and fibromyalgia  Continue conservative use of narcotics, tapering down as tolerated                 Weakness   Assessment & Plan    Secondary to above.  PT/OT following.   Pending SNF placement.        Normocytic anemia- (present on admission)   Assessment & Plan    Stable.        Chronic pain syndrome- (present on admission)   Assessment & Plan    Pain controlled.  Tapering down as tolerated  Possible unintentional narcotics overdose suspected on admit          Campylobacter diarrhea- (present on admission)   Assessment & Plan    Campylobacter on cultures from transferring facility. C-diff negative here on 4/10/18.  Resolved.            Hyperglycemia   Assessment & Plan    A1C normal at 5.4. No hx of Dm.  This likely steroid induced.   Following            Leukocytosis- (present on admission)   Assessment & Plan    WBC elevated, will repeat in am  Completed Unasyn and Azithro 4/16.  Procalcitonin was normal.  Clinically stable  Likely secondary to steroids.          Elevated brain natriuretic peptide (BNP) level   Assessment & Plan    BNP 8000s on arrival.   TTE showed normal diastolic function & EF 70%.  Euvolemic on exam            Elevated troponin- (present on admission)   Assessment & Plan    No CP or hx of CAD.  Normal echocardiogram.  Repeat trended down.   No need for further studies at this time, consider stress test as outpatient when other  issues stable        Autonomic neuropathy- (present on admission)   Assessment & Plan    On midodrine and hydrocortisone  Following          Anorexia- (present on admission)   Assessment & Plan    Continue to encourage oral intake.  Appropriate caloric intake discussed with patient and family.  She has required a PEG in the past.  Dietician following.        Aspiration pneumonia (HCC)- (present on admission)   Assessment & Plan    Completed a course of Unasyn  Normal CT of chest  Clinically resolved with normal pulm exam and no pulm sx                Quality-Core Measures

## 2018-05-09 LAB
BASOPHILS # BLD AUTO: 0.1 % (ref 0–1.8)
BASOPHILS # BLD: 0.02 K/UL (ref 0–0.12)
EOSINOPHIL # BLD AUTO: 0 K/UL (ref 0–0.51)
EOSINOPHIL NFR BLD: 0 % (ref 0–6.9)
ERYTHROCYTE [DISTWIDTH] IN BLOOD BY AUTOMATED COUNT: 42.8 FL (ref 35.9–50)
HCT VFR BLD AUTO: 41.7 % (ref 37–47)
HGB BLD-MCNC: 14 G/DL (ref 12–16)
IMM GRANULOCYTES # BLD AUTO: 0.05 K/UL (ref 0–0.11)
IMM GRANULOCYTES NFR BLD AUTO: 0.3 % (ref 0–0.9)
LYMPHOCYTES # BLD AUTO: 2.29 K/UL (ref 1–4.8)
LYMPHOCYTES NFR BLD: 13.5 % (ref 22–41)
MCH RBC QN AUTO: 30.3 PG (ref 27–33)
MCHC RBC AUTO-ENTMCNC: 33.6 G/DL (ref 33.6–35)
MCV RBC AUTO: 90.3 FL (ref 81.4–97.8)
MONOCYTES # BLD AUTO: 0.42 K/UL (ref 0–0.85)
MONOCYTES NFR BLD AUTO: 2.5 % (ref 0–13.4)
NEUTROPHILS # BLD AUTO: 14.2 K/UL (ref 2–7.15)
NEUTROPHILS NFR BLD: 83.6 % (ref 44–72)
NRBC # BLD AUTO: 0 K/UL
NRBC BLD-RTO: 0 /100 WBC
PLATELET # BLD AUTO: 205 K/UL (ref 164–446)
PMV BLD AUTO: 11 FL (ref 9–12.9)
RBC # BLD AUTO: 4.62 M/UL (ref 4.2–5.4)
WBC # BLD AUTO: 17 K/UL (ref 4.8–10.8)

## 2018-05-09 PROCEDURE — 700111 HCHG RX REV CODE 636 W/ 250 OVERRIDE (IP): Performed by: HOSPITALIST

## 2018-05-09 PROCEDURE — 92526 ORAL FUNCTION THERAPY: CPT

## 2018-05-09 PROCEDURE — 700102 HCHG RX REV CODE 250 W/ 637 OVERRIDE(OP): Performed by: NURSE PRACTITIONER

## 2018-05-09 PROCEDURE — 85025 COMPLETE CBC W/AUTO DIFF WBC: CPT

## 2018-05-09 PROCEDURE — 36415 COLL VENOUS BLD VENIPUNCTURE: CPT

## 2018-05-09 PROCEDURE — 99232 SBSQ HOSP IP/OBS MODERATE 35: CPT | Performed by: HOSPITALIST

## 2018-05-09 PROCEDURE — 97535 SELF CARE MNGMENT TRAINING: CPT

## 2018-05-09 PROCEDURE — A9270 NON-COVERED ITEM OR SERVICE: HCPCS | Performed by: INTERNAL MEDICINE

## 2018-05-09 PROCEDURE — 700102 HCHG RX REV CODE 250 W/ 637 OVERRIDE(OP): Performed by: INTERNAL MEDICINE

## 2018-05-09 PROCEDURE — 770006 HCHG ROOM/CARE - MED/SURG/GYN SEMI*

## 2018-05-09 PROCEDURE — A9270 NON-COVERED ITEM OR SERVICE: HCPCS | Performed by: HOSPITALIST

## 2018-05-09 PROCEDURE — A9270 NON-COVERED ITEM OR SERVICE: HCPCS | Performed by: NURSE PRACTITIONER

## 2018-05-09 PROCEDURE — 700102 HCHG RX REV CODE 250 W/ 637 OVERRIDE(OP): Performed by: HOSPITALIST

## 2018-05-09 PROCEDURE — 92507 TX SP LANG VOICE COMM INDIV: CPT

## 2018-05-09 RX ADMIN — OXYCODONE HYDROCHLORIDE 10 MG: 10 TABLET ORAL at 21:28

## 2018-05-09 RX ADMIN — ENOXAPARIN SODIUM 40 MG: 100 INJECTION SUBCUTANEOUS at 08:19

## 2018-05-09 RX ADMIN — LEVETIRACETAM 500 MG: 500 TABLET, FILM COATED ORAL at 08:18

## 2018-05-09 RX ADMIN — STANDARDIZED SENNA CONCENTRATE AND DOCUSATE SODIUM 2 TABLET: 8.6; 5 TABLET, FILM COATED ORAL at 21:25

## 2018-05-09 RX ADMIN — LEVETIRACETAM 500 MG: 500 TABLET, FILM COATED ORAL at 21:24

## 2018-05-09 RX ADMIN — MIDODRINE HYDROCHLORIDE 10 MG: 5 TABLET ORAL at 17:27

## 2018-05-09 RX ADMIN — OXYCODONE HYDROCHLORIDE 10 MG: 10 TABLET ORAL at 10:55

## 2018-05-09 RX ADMIN — MIDODRINE HYDROCHLORIDE 10 MG: 5 TABLET ORAL at 14:25

## 2018-05-09 RX ADMIN — HYDROCORTISONE 40 MG: 20 TABLET ORAL at 21:24

## 2018-05-09 RX ADMIN — PREGABALIN 150 MG: 75 CAPSULE ORAL at 21:24

## 2018-05-09 RX ADMIN — PREGABALIN 150 MG: 75 CAPSULE ORAL at 08:18

## 2018-05-09 RX ADMIN — HYDROCORTISONE 40 MG: 20 TABLET ORAL at 08:18

## 2018-05-09 RX ADMIN — MIDODRINE HYDROCHLORIDE 10 MG: 5 TABLET ORAL at 21:24

## 2018-05-09 RX ADMIN — HYDROCORTISONE 40 MG: 20 TABLET ORAL at 14:25

## 2018-05-09 RX ADMIN — ASPIRIN 81 MG: 81 TABLET, CHEWABLE ORAL at 08:19

## 2018-05-09 RX ADMIN — MIDODRINE HYDROCHLORIDE 10 MG: 5 TABLET ORAL at 08:18

## 2018-05-09 ASSESSMENT — ENCOUNTER SYMPTOMS
RESPIRATORY NEGATIVE: 1
SEIZURES: 0
CARDIOVASCULAR NEGATIVE: 1
SHORTNESS OF BREATH: 0
NEUROLOGICAL NEGATIVE: 1
HEADACHES: 0
MUSCULOSKELETAL NEGATIVE: 1
DIZZINESS: 0
FOCAL WEAKNESS: 0
EYES NEGATIVE: 1
TINGLING: 0
GASTROINTESTINAL NEGATIVE: 1
TREMORS: 0
CONSTITUTIONAL NEGATIVE: 1

## 2018-05-09 ASSESSMENT — PAIN SCALES - GENERAL
PAINLEVEL_OUTOF10: 8
PAINLEVEL_OUTOF10: 10
PAINLEVEL_OUTOF10: 5
PAINLEVEL_OUTOF10: 9
PAINLEVEL_OUTOF10: 5

## 2018-05-09 ASSESSMENT — COGNITIVE AND FUNCTIONAL STATUS - GENERAL
HELP NEEDED FOR BATHING: A LITTLE
PERSONAL GROOMING: A LITTLE
DRESSING REGULAR LOWER BODY CLOTHING: A LITTLE
TOILETING: A LITTLE
SUGGESTED CMS G CODE MODIFIER DAILY ACTIVITY: CK
DRESSING REGULAR UPPER BODY CLOTHING: A LITTLE
DAILY ACTIVITIY SCORE: 19

## 2018-05-09 NOTE — CARE PLAN
Problem: Safety  Goal: Will remain free from falls    Intervention: Implement fall precautions  Bed alarms in place to help reduce fall risk.        Problem: Infection  Goal: Will remain free from infection    Intervention: Implement standard precautions and perform hand washing before and after patient contact  Instruct pt/family on standard precautions to help prevent spread of infection/germs.

## 2018-05-09 NOTE — PROGRESS NOTES
Attempted to ambulate pt. O2 Sats 96% on r.a., pt became dizzy and O2 sats declined to 88%. Got pt back to bed and assessed Vitals, 96% on 1L n.c., RR 18,  112/69, and pulse 61. Call light within reach, bed in lowest position and bed alarm in place. Will continue hourly rounding.

## 2018-05-09 NOTE — THERAPY
"Occupational Therapy Treatment completed with focus on ADLs and ADL transfers.  Functional Status:  Pt seen for OT tx today.  Pt was pleasant and cooperative during the session.  Continues to be limited by endurance, transfers, and self care.  Pt completed toileting with supervision, LB dressing with supervision, and standing gr/hy with supervision unable to stand upright using B elbows at times to balance while brushing teeth.  Needing occasional cues to pace self and sit when feeling dizzy spells.  After 2 minutes of dynamic standing x2 pt became dizzy placing her head below her waist needing cues to sit until spell has passed.  Pt completed supine to sit mod I, sit to stand with supervision, and room ambulation using from bed to toilet with CGA.  Pt would benefit from continued inpt OT as they are continuing to need assistance with supervision for all ADLs due to easily fatigued and pt stated drop in BP unless she has 24/7 care during functional ambulation.    Plan of Care: Will benefit from Occupational Therapy 3 times per week  Discharge Recommendations:  Equipment Will Continue to Assess for Equipment Needs.   See \"Rehab Therapy-Acute\" Patient Summary Report for complete documentation.   "

## 2018-05-09 NOTE — CARE PLAN
Problem: Safety  Goal: Will remain free from injury    Intervention: Provide assistance with mobility  Pt calls for assistance and needs.

## 2018-05-09 NOTE — PROGRESS NOTES
Pt stable through out the night. Pt alert and oriented x 4. Afebrile and vs stable. Hourly rounding in place. Bed lowered, locked and call light within reach.

## 2018-05-09 NOTE — DISCHARGE PLANNING
Attempted to follow up with [facility], however, no answer. Voicemail left. Referrals resent to [facility] and [facility].

## 2018-05-09 NOTE — DISCHARGE PLANNING
NAZANIN received call from pt's daughter requesting update on d/c. SW informed daughter that SNF choices have been expanded to [place].   Daughter concerned because she just spoke with pt who is complaining of pain on top of her head and on her left side of spine. NAZANIN relayed information to Dr. Brooks.

## 2018-05-09 NOTE — PROGRESS NOTES
[facility] Hospitalist Progress Note    Date of Service: 2018    Chief Complaint  53 y.o. female admitted 4/10/2018 with altered mental status who was found to have evidence of multiple acute strokes and new onset seizure disorder    Interval Problem Update  Axox3 but poor memory  Denies pain  No further seizures  ROS otherwise negative  Calm and cooperative    Consultants/Specialty  Neurology    Disposition  Awaiting snf acceptance in [place]        Review of Systems   Constitutional: Negative.    HENT: Negative.    Eyes: Negative.    Respiratory: Negative.  Negative for shortness of breath.    Cardiovascular: Negative.  Negative for chest pain.   Gastrointestinal: Negative.    Genitourinary: Negative.    Musculoskeletal: Negative.    Skin: Negative.    Neurological: Negative.  Negative for dizziness, tingling, tremors, focal weakness, seizures and headaches.   Endo/Heme/Allergies: Negative.       Physical Exam  Laboratory/Imaging   Hemodynamics  Temp (24hrs), Av.5 °C (97.7 °F), Min:36.2 °C (97.1 °F), Max:37.1 °C (98.8 °F)   Temperature: 36.2 °C (97.2 °F)  Pulse  Av.6  Min: 12  Max: 157    Blood Pressure: 109/71     Respiratory      Respiration: 17, Pulse Oximetry: 93 %     Work Of Breathing / Effort: Mild  RUL Breath Sounds: Clear, RML Breath Sounds: Clear, RLL Breath Sounds: Clear, JOLYNN Breath Sounds: Clear, LLL Breath Sounds: Clear    Fluids  No intake or output data in the 24 hours ending 18 1011    Nutrition  Orders Placed This Encounter   Procedures   • DIET ORDER     Standing Status:   Standing     Number of Occurrences:   1     Order Specific Question:   Diet:     Answer:   Regular [1]     Order Specific Question:   Texture/Fiber modifications:     Answer:   Dysphagia 3(Mechanical Soft)specify fluid consistency(question 6) [3]     Order Specific Question:   Consistency/Fluid modifications:     Answer:   Thin Liquids [3]     Physical Exam   Constitutional: She is oriented to person, place, and  time. She appears well-developed and well-nourished.   HENT:   Head: Normocephalic and atraumatic.   Eyes: Conjunctivae are normal. Right eye exhibits no discharge. Left eye exhibits no discharge. No scleral icterus.   Neck: Normal range of motion. No JVD present.   Cardiovascular: Normal rate and regular rhythm.  Exam reveals friction rub. Exam reveals no gallop.    No murmur heard.  Pulmonary/Chest: Effort normal and breath sounds normal. Stridor present. No respiratory distress. She has no wheezes. She has no rales. She exhibits no tenderness.   Abdominal: Soft. Bowel sounds are normal. She exhibits no distension and no mass. There is no tenderness. There is no rebound and no guarding.   Musculoskeletal: Normal range of motion. She exhibits edema. She exhibits no deformity.   Lymphadenopathy:     She has no cervical adenopathy.   Neurological: She is alert and oriented to person, place, and time. She has normal reflexes. No cranial nerve deficit. Coordination normal.   Skin: Skin is warm and dry. No rash noted. No erythema. No pallor.   Psychiatric: She has a normal mood and affect. Her behavior is normal. Judgment and thought content normal.       Recent Labs      05/09/18   0302   WBC  17.0*   RBC  4.62   HEMOGLOBIN  14.0   HEMATOCRIT  41.7   MCV  90.3   MCH  30.3   MCHC  33.6   RDW  42.8   PLATELETCT  205   MPV  11.0                          Assessment/Plan     * Acute CVA (cerebrovascular accident) (HCC)   Assessment & Plan    Multifocal ischemic infarcts  Neurology has signed off  Carotid US normal.  Continue aspirin  LACI w bubble study showed PFO.  Patient wishing to treat medically at this time.  Anticipate loop recorder placement prior to d/c to r/o embolic origin.  She passed her swallow eval and has been tolerating a diet.  Anticipate need for SNF at d/c.                Acute respiratory failure with hypoxia (HCC)- (present on admission)   Assessment & Plan    Likely secondary to seizure.  Required  intubation on 4/26 and extubation on 4/27.  Pulmonology has signed off          Hypotension- (present on admission)   Assessment & Plan    Has had intermittent episodes of unresponsiveness while ambulating secondary to orthostatic hypotension.   This is a chronic issue.  Much improved  No recent sx  Continue midodrine and hydrocortisone.            Seizure disorder as sequela of cerebrovascular accident (HCC)- (present on admission)   Assessment & Plan    Neurology has now signed off  Clinically stable with no signs of further seizure activity on current dose of keppra.            Drug overdose - unintentional- (present on admission)   Assessment & Plan    Buprenorphine patch removed PTA.   Patient was on high doses of narcotics at home for chronic pain and fibromyalgia  Continue conservative use of narcotics, tapering down as tolerated                 Weakness   Assessment & Plan    Secondary to above.  PT/OT following.   Pending SNF placement.        Normocytic anemia- (present on admission)   Assessment & Plan    Stable.        Chronic pain syndrome- (present on admission)   Assessment & Plan    Pain controlled.  Tapering down as tolerated  Possible unintentional narcotics overdose suspected on admit          Campylobacter diarrhea- (present on admission)   Assessment & Plan    Campylobacter on cultures from transferring facility. C-diff negative here on 4/10/18.  Resolved.            Hyperglycemia   Assessment & Plan    A1C normal at 5.4. No hx of Dm.  This likely steroid induced.   Following            Leukocytosis- (present on admission)   Assessment & Plan    WBC elevated, will repeat in am  Completed Unasyn and Azithro 4/16.  Procalcitonin was normal.  Clinically stable  Likely secondary to steroids.          Elevated brain natriuretic peptide (BNP) level   Assessment & Plan    BNP 8000s on arrival.   TTE showed normal diastolic function & EF 70%.  Euvolemic on exam            Elevated troponin- (present on  admission)   Assessment & Plan    No CP or hx of CAD.  Normal echocardiogram.  Repeat trended down.   No need for further studies at this time, consider stress test as outpatient when other issues stable        Autonomic neuropathy- (present on admission)   Assessment & Plan    On midodrine and hydrocortisone  Following          Anorexia- (present on admission)   Assessment & Plan    Continue to encourage oral intake.  Appropriate caloric intake discussed with patient and family.  She has required a PEG in the past.  Dietician following.        Aspiration pneumonia (HCC)- (present on admission)   Assessment & Plan    Completed a course of Unasyn  Normal CT of chest  Clinically resolved with normal pulm exam and no pulm sx                Quality-Core Measures

## 2018-05-09 NOTE — PROGRESS NOTES
Assumed pt care @ 0700. Pt awake, sitting up in bed for breakfast. A&Ox4. Denies pain at this time. Medicated per MAR. Educated pt the about maintaining 02 sats >90%, on 2L n.c.  Tolerating dysphagia 3 diet, bed alarm in place, and call light within reach.

## 2018-05-09 NOTE — PROGRESS NOTES
Pt in bed and resting. A&Ox4. C/O of pain, head and spine, 8/10, and medicated per MAR. Call light within reach, bed alarm in place. Will continue hourly rounding.

## 2018-05-09 NOTE — CARE PLAN
Problem: Pain Management  Goal: Pain level will decrease to patient's comfort goal    Intervention: Follow pain managment plan developed in collaboration with patient and Interdisciplinary Team  Pt resting following prn pain medications.

## 2018-05-10 LAB
BASOPHILS # BLD AUTO: 0.1 % (ref 0–1.8)
BASOPHILS # BLD: 0.02 K/UL (ref 0–0.12)
EOSINOPHIL # BLD AUTO: 0.01 K/UL (ref 0–0.51)
EOSINOPHIL NFR BLD: 0.1 % (ref 0–6.9)
ERYTHROCYTE [DISTWIDTH] IN BLOOD BY AUTOMATED COUNT: 43.4 FL (ref 35.9–50)
HCT VFR BLD AUTO: 39.4 % (ref 37–47)
HGB BLD-MCNC: 13.3 G/DL (ref 12–16)
IMM GRANULOCYTES # BLD AUTO: 0.07 K/UL (ref 0–0.11)
IMM GRANULOCYTES NFR BLD AUTO: 0.4 % (ref 0–0.9)
LYMPHOCYTES # BLD AUTO: 2.39 K/UL (ref 1–4.8)
LYMPHOCYTES NFR BLD: 14.8 % (ref 22–41)
MCH RBC QN AUTO: 30.6 PG (ref 27–33)
MCHC RBC AUTO-ENTMCNC: 33.8 G/DL (ref 33.6–35)
MCV RBC AUTO: 90.6 FL (ref 81.4–97.8)
MONOCYTES # BLD AUTO: 0.52 K/UL (ref 0–0.85)
MONOCYTES NFR BLD AUTO: 3.2 % (ref 0–13.4)
NEUTROPHILS # BLD AUTO: 13.13 K/UL (ref 2–7.15)
NEUTROPHILS NFR BLD: 81.4 % (ref 44–72)
NRBC # BLD AUTO: 0 K/UL
NRBC BLD-RTO: 0 /100 WBC
PLATELET # BLD AUTO: 223 K/UL (ref 164–446)
PMV BLD AUTO: 11.1 FL (ref 9–12.9)
RBC # BLD AUTO: 4.35 M/UL (ref 4.2–5.4)
WBC # BLD AUTO: 16.1 K/UL (ref 4.8–10.8)

## 2018-05-10 PROCEDURE — 700102 HCHG RX REV CODE 250 W/ 637 OVERRIDE(OP): Performed by: INTERNAL MEDICINE

## 2018-05-10 PROCEDURE — 99232 SBSQ HOSP IP/OBS MODERATE 35: CPT | Performed by: HOSPITALIST

## 2018-05-10 PROCEDURE — A9270 NON-COVERED ITEM OR SERVICE: HCPCS | Performed by: HOSPITALIST

## 2018-05-10 PROCEDURE — 700102 HCHG RX REV CODE 250 W/ 637 OVERRIDE(OP): Performed by: HOSPITALIST

## 2018-05-10 PROCEDURE — A9270 NON-COVERED ITEM OR SERVICE: HCPCS | Performed by: NURSE PRACTITIONER

## 2018-05-10 PROCEDURE — 36415 COLL VENOUS BLD VENIPUNCTURE: CPT

## 2018-05-10 PROCEDURE — 700111 HCHG RX REV CODE 636 W/ 250 OVERRIDE (IP): Performed by: HOSPITALIST

## 2018-05-10 PROCEDURE — A9270 NON-COVERED ITEM OR SERVICE: HCPCS | Performed by: INTERNAL MEDICINE

## 2018-05-10 PROCEDURE — 700102 HCHG RX REV CODE 250 W/ 637 OVERRIDE(OP): Performed by: NURSE PRACTITIONER

## 2018-05-10 PROCEDURE — 770006 HCHG ROOM/CARE - MED/SURG/GYN SEMI*

## 2018-05-10 PROCEDURE — 85025 COMPLETE CBC W/AUTO DIFF WBC: CPT

## 2018-05-10 RX ORDER — HYDROCORTISONE 20 MG/1
20 TABLET ORAL 3 TIMES DAILY
Status: DISCONTINUED | OUTPATIENT
Start: 2018-05-10 | End: 2018-05-15

## 2018-05-10 RX ADMIN — OXYCODONE HYDROCHLORIDE 10 MG: 10 TABLET ORAL at 04:41

## 2018-05-10 RX ADMIN — LEVETIRACETAM 500 MG: 500 TABLET, FILM COATED ORAL at 08:12

## 2018-05-10 RX ADMIN — MIDODRINE HYDROCHLORIDE 10 MG: 5 TABLET ORAL at 08:12

## 2018-05-10 RX ADMIN — MIDODRINE HYDROCHLORIDE 10 MG: 5 TABLET ORAL at 16:52

## 2018-05-10 RX ADMIN — PREGABALIN 150 MG: 75 CAPSULE ORAL at 08:12

## 2018-05-10 RX ADMIN — ENOXAPARIN SODIUM 40 MG: 100 INJECTION SUBCUTANEOUS at 08:12

## 2018-05-10 RX ADMIN — HYDROCORTISONE 40 MG: 20 TABLET ORAL at 08:12

## 2018-05-10 RX ADMIN — OXYCODONE HYDROCHLORIDE 10 MG: 10 TABLET ORAL at 20:14

## 2018-05-10 RX ADMIN — ASPIRIN 81 MG: 81 TABLET, CHEWABLE ORAL at 08:12

## 2018-05-10 RX ADMIN — OXYCODONE HYDROCHLORIDE 10 MG: 10 TABLET ORAL at 16:54

## 2018-05-10 RX ADMIN — HYDROCORTISONE 20 MG: 20 TABLET ORAL at 20:15

## 2018-05-10 RX ADMIN — MIDODRINE HYDROCHLORIDE 10 MG: 5 TABLET ORAL at 13:15

## 2018-05-10 RX ADMIN — MIDODRINE HYDROCHLORIDE 10 MG: 5 TABLET ORAL at 20:14

## 2018-05-10 RX ADMIN — LEVETIRACETAM 500 MG: 500 TABLET, FILM COATED ORAL at 20:14

## 2018-05-10 RX ADMIN — PREGABALIN 150 MG: 75 CAPSULE ORAL at 20:14

## 2018-05-10 RX ADMIN — HYDROCORTISONE 40 MG: 20 TABLET ORAL at 16:52

## 2018-05-10 ASSESSMENT — ENCOUNTER SYMPTOMS
CARDIOVASCULAR NEGATIVE: 1
TINGLING: 0
DIZZINESS: 0
NEUROLOGICAL NEGATIVE: 1
GASTROINTESTINAL NEGATIVE: 1
TREMORS: 0
SEIZURES: 0
CONSTITUTIONAL NEGATIVE: 1
HEADACHES: 0
RESPIRATORY NEGATIVE: 1
FOCAL WEAKNESS: 0
EYES NEGATIVE: 1
SHORTNESS OF BREATH: 0
MUSCULOSKELETAL NEGATIVE: 1

## 2018-05-10 ASSESSMENT — PAIN SCALES - GENERAL
PAINLEVEL_OUTOF10: 8
PAINLEVEL_OUTOF10: 5
PAINLEVEL_OUTOF10: 4
PAINLEVEL_OUTOF10: 3
PAINLEVEL_OUTOF10: 1
PAINLEVEL_OUTOF10: 8

## 2018-05-10 NOTE — DISCHARGE PLANNING
Per rounds, SW discussed possible d/c home with family and hiring a caregiver to come for a few hours a day. Dr. Brooks agreeable. SW to call and discuss plan with daughter tomorrow.

## 2018-05-10 NOTE — PROGRESS NOTES
Pt in bed, resting comfortably. C/O of HA 10/10, asked if a cold or hot pack would help, pt declined. Ambulated pt to bathroom, denies dizziness at this time, got pt back to bed resting comfortably. Call light with in reach. Bed in lowest position. Hourly rounding in place.

## 2018-05-10 NOTE — PROGRESS NOTES
Pharmacy Pharmacotherapy Consult for LOS >30 days    Admit Date: 4/10/2018      Medications were reviewed for appropriateness and ongoing need.     Current Facility-Administered Medications   Medication Dose Route Frequency Provider Last Rate Last Dose   • hydrocortisone (CORTEF) tablet 40 mg  40 mg Oral TID Tristan Grady, A.P.R.N.   40 mg at 05/10/18 0812   • midodrine (PROAMATINE) tablet 10 mg  10 mg Oral 4X/DAY Tristan Deynk, A.P.R.N.   10 mg at 05/10/18 1315   • oxyCODONE immediate-release (ROXICODONE) tablet 5 mg  5 mg Oral Q4HRS PRN Titi Batista Jr., D.O.   5 mg at 05/07/18 1340    Or   • oxyCODONE immediate release (ROXICODONE) tablet 10 mg  10 mg Oral Q4HRS PRN Titi Batista Jr., D.O.   10 mg at 05/10/18 0441   • levETIRAcetam (KEPPRA) tablet 500 mg  500 mg Oral BID Angel Ocampo M.D.   500 mg at 05/10/18 0812   • enoxaparin (LOVENOX) inj 40 mg  40 mg Subcutaneous DAILY Angel Ocampo M.D.   40 mg at 05/10/18 0812   • Respiratory Care per Protocol   Nebulization Continuous RT Fantasma Lyle M.D.       • ipratropium-albuterol (DUONEB) nebulizer solution 3 mL  3 mL Nebulization Q2HRS PRN (RT) Fantasma Lyle M.D.       • acetaminophen (TYLENOL) tablet 650 mg  650 mg Per NG Tube Q6HRS PRN Fantasma Lyle M.D.   650 mg at 05/08/18 1642   • aspirin (ASA) chewable tab 81 mg  81 mg Per NG Tube DAILY Fantasma Lyle M.D.   81 mg at 05/10/18 0812   • loperamide (IMODIUM) capsule 2 mg  2 mg Per NG Tube 4X/DAY PRN Fantasma Lyle M.D.       • polyethylene glycol/lytes (MIRALAX) PACKET 1 Packet  1 Packet Per NG Tube QDAY PRN Fantasma Lyle M.D.        And   • senna-docusate (PERICOLACE or SENOKOT S) 8.6-50 MG per tablet 2 Tab  2 Tab Per NG Tube BID Fantasma Lyle M.D.   2 Tab at 05/09/18 2125    And   • magnesium hydroxide (MILK OF MAGNESIA) suspension 30 mL  30 mL Per NG Tube QDAY PRN Fantasma Lyle M.D.        And   • bisacodyl (DULCOLAX) suppository 10 mg  10 mg Rectal QDAY PRN Fantasma JORDAN  CONTRERAS Lyle       • pregabalin (LYRICA) capsule 150 mg  150 mg Per NG Tube BID Fantasma Lyle M.D.   150 mg at 05/10/18 0812   • miconazole 2%-zinc oxide (TOM) topical cream   Topical Q6HR Jesús Palma D.O.       • labetalol (NORMODYNE,TRANDATE) injection 5 mg  5 mg Intravenous Q4HRS PRN Cierra Parker M.D.           Recommendations:    1. Re-evaluate dosage/use of hydrocortisone for blood pressure support - weigh risk vs benefit of long-term corticosteroids.  2. Re-evaluate need for daily use of miconazole cream - recommended use only up to 14 days - if fungal infection exists/persists, may need alternative therapy.  3. As-needed DuoNeb never given, recommend discontinue.  4. As-needed labetalol never given, recommend discontinue.  5. As-needed loperamide never given, recommend discontinue.    All other meds used appropriately    Signed: Wang Kline, LidiaD

## 2018-05-10 NOTE — CARE PLAN
Problem: Pain Management  Goal: Pain level will decrease to patient's comfort goal  Pt resting well following prn pain medication.

## 2018-05-10 NOTE — PROGRESS NOTES
[facility] Hospitalist Progress Note    Date of Service: 5/10/2018    Chief Complaint  53 y.o. female admitted 4/10/2018 with altered mental status who was found to have evidence of multiple acute strokes and new onset seizure disorder    Interval Problem Update  Remains axox3 with poor memory  Remains free of further seizures  ROS otherwise negative  Calm and cooperative  Denies pain    Consultants/Specialty  Neurology    Disposition  Awaiting snf acceptance in [place]        Review of Systems   Constitutional: Negative.    HENT: Negative.    Eyes: Negative.    Respiratory: Negative.  Negative for shortness of breath.    Cardiovascular: Negative.  Negative for chest pain.   Gastrointestinal: Negative.    Genitourinary: Negative.    Musculoskeletal: Negative.    Skin: Negative.    Neurological: Negative.  Negative for dizziness, tingling, tremors, focal weakness, seizures and headaches.   Endo/Heme/Allergies: Negative.       Physical Exam  Laboratory/Imaging   Hemodynamics  Temp (24hrs), Av.9 °C (98.4 °F), Min:36.4 °C (97.6 °F), Max:37.6 °C (99.7 °F)   Temperature: 36.6 °C (97.9 °F)  Pulse  Av.5  Min: 12  Max: 157    Blood Pressure: 132/77     Respiratory      Respiration: 16, Pulse Oximetry: 96 %     Work Of Breathing / Effort: Mild  RUL Breath Sounds: Clear, RML Breath Sounds: Clear, RLL Breath Sounds: Clear, JOLYNN Breath Sounds: Clear, LLL Breath Sounds: Clear    Fluids  No intake or output data in the 24 hours ending 05/10/18 0949    Nutrition  Orders Placed This Encounter   Procedures   • DIET ORDER     Standing Status:   Standing     Number of Occurrences:   1     Order Specific Question:   Diet:     Answer:   Regular [1]     Order Specific Question:   Texture/Fiber modifications:     Answer:   Dysphagia 3(Mechanical Soft)specify fluid consistency(question 6) [3]     Order Specific Question:   Consistency/Fluid modifications:     Answer:   Thin Liquids [3]     Physical Exam   Constitutional: She is oriented  to person, place, and time. She appears well-developed and well-nourished.   HENT:   Head: Normocephalic and atraumatic.   Eyes: Conjunctivae are normal. Right eye exhibits no discharge. Left eye exhibits no discharge. No scleral icterus.   Neck: Normal range of motion. No JVD present.   Cardiovascular: Normal rate and regular rhythm.  Exam reveals friction rub. Exam reveals no gallop.    No murmur heard.  Pulmonary/Chest: Effort normal and breath sounds normal. Stridor present. No respiratory distress. She has no wheezes. She has no rales. She exhibits no tenderness.   Abdominal: Soft. Bowel sounds are normal. She exhibits no distension and no mass. There is no tenderness. There is no rebound and no guarding.   Musculoskeletal: Normal range of motion. She exhibits edema. She exhibits no deformity.   Lymphadenopathy:     She has no cervical adenopathy.   Neurological: She is alert and oriented to person, place, and time. She has normal reflexes. No cranial nerve deficit. Coordination normal.   Skin: Skin is warm and dry. No rash noted. No erythema. No pallor.   Psychiatric: She has a normal mood and affect. Her behavior is normal. Judgment and thought content normal.       Recent Labs      05/09/18   0302  05/10/18   0301   WBC  17.0*  16.1*   RBC  4.62  4.35   HEMOGLOBIN  14.0  13.3   HEMATOCRIT  41.7  39.4   MCV  90.3  90.6   MCH  30.3  30.6   MCHC  33.6  33.8   RDW  42.8  43.4   PLATELETCT  205  223   MPV  11.0  11.1                          Assessment/Plan     * Acute CVA (cerebrovascular accident) (HCC)   Assessment & Plan    Multifocal ischemic infarcts  Neurology has signed off  Carotid US normal.  Continue aspirin  LACI w bubble study showed PFO.  Patient wishing to treat medically at this time.  Anticipate loop recorder placement prior to d/c to r/o embolic origin.  She passed her swallow eval and has been tolerating a diet.  Anticipate need for SNF at d/c.                Acute respiratory failure with  hypoxia (HCC)- (present on admission)   Assessment & Plan    Likely secondary to seizure.  Required intubation on 4/26 and extubation on 4/27.  Pulmonology has signed off  Resolved        Hypotension- (present on admission)   Assessment & Plan    Has had chronic intermittent episodes of unresponsiveness while ambulating secondary to orthostatic hypotension.   None recently  Continue midodrine and hydrocortisone.            Seizure disorder as sequela of cerebrovascular accident (HCC)- (present on admission)   Assessment & Plan    Neurology has signed off  No further seizure activity  Continue keppra.            Drug overdose - unintentional- (present on admission)   Assessment & Plan    Buprenorphine patch removed PTA.   Patient was on high doses of narcotics at home for chronic pain and fibromyalgia  Continue conservative use of narcotics, tapering down as tolerated                 Weakness   Assessment & Plan    Secondary to above.  PT/OT following.   Pending SNF placement.        Normocytic anemia- (present on admission)   Assessment & Plan    Stable.        Chronic pain syndrome- (present on admission)   Assessment & Plan    Pain controlled.  Tapering down as tolerated  Possible unintentional narcotics overdose suspected on admit          Campylobacter diarrhea- (present on admission)   Assessment & Plan    Campylobacter on cultures from transferring facility. C-diff negative here on 4/10/18.  Resolved.            Hyperglycemia   Assessment & Plan    A1C normal at 5.4. No hx of Dm.  This likely steroid induced.   Following            Leukocytosis- (present on admission)   Assessment & Plan    WBC remains elevated but is decreasing  Completed Unasyn and Azithro 4/16.  Procalcitonin was normal.  Clinically stable  Likely secondary to steroids.          Elevated brain natriuretic peptide (BNP) level   Assessment & Plan    BNP 8000s on arrival.   TTE showed normal diastolic function & EF 70%.  Remains euvolemic on  exam            Elevated troponin- (present on admission)   Assessment & Plan    No CP or hx of CAD.  Normal echocardiogram.  Repeat trended down.   No need for further studies at this time, consider stress test as outpatient when other issues stable        Autonomic neuropathy- (present on admission)   Assessment & Plan    On midodrine and hydrocortisone  Following          Anorexia- (present on admission)   Assessment & Plan    Continue to encourage oral intake.  Appropriate caloric intake discussed with patient and family.  She has required a PEG in the past.  Dietician following.        Aspiration pneumonia (HCC)- (present on admission)   Assessment & Plan    Completed a course of Unasyn  Normal CT of chest  Clinically resolved with normal pulm exam and no pulm sx                Quality-Core Measures

## 2018-05-10 NOTE — PROGRESS NOTES
Pt c/o head hurting during the night. Pain medication administered per MAR.  Vital signs stable and pt afebrile. Hourly rounding in place. Bed lowered, locked and call light within reach.

## 2018-05-10 NOTE — CARE PLAN
Problem: Safety  Goal: Will remain free from falls    Intervention: Implement fall precautions  Bed alarm, non skid socks, mobility sign, in place and bed rails x2 up.       Problem: Mobility  Goal: Risk for activity intolerance will decrease    Intervention: Assess and monitor signs of activity intolerance  Up to commode, unable to ambulate at this time.

## 2018-05-11 LAB
BASOPHILS # BLD AUTO: 0.4 % (ref 0–1.8)
BASOPHILS # BLD: 0.11 K/UL (ref 0–0.12)
EOSINOPHIL # BLD AUTO: 0.02 K/UL (ref 0–0.51)
EOSINOPHIL NFR BLD: 0.1 % (ref 0–6.9)
ERYTHROCYTE [DISTWIDTH] IN BLOOD BY AUTOMATED COUNT: 44.3 FL (ref 35.9–50)
HCT VFR BLD AUTO: 40.5 % (ref 37–47)
HGB BLD-MCNC: 13.1 G/DL (ref 12–16)
IMM GRANULOCYTES # BLD AUTO: 0.15 K/UL (ref 0–0.11)
IMM GRANULOCYTES NFR BLD AUTO: 0.5 % (ref 0–0.9)
LYMPHOCYTES # BLD AUTO: 3.13 K/UL (ref 1–4.8)
LYMPHOCYTES NFR BLD: 11.3 % (ref 22–41)
MCH RBC QN AUTO: 29.6 PG (ref 27–33)
MCHC RBC AUTO-ENTMCNC: 32.3 G/DL (ref 33.6–35)
MCV RBC AUTO: 91.6 FL (ref 81.4–97.8)
MONOCYTES # BLD AUTO: 1.16 K/UL (ref 0–0.85)
MONOCYTES NFR BLD AUTO: 4.2 % (ref 0–13.4)
NEUTROPHILS # BLD AUTO: 23.18 K/UL (ref 2–7.15)
NEUTROPHILS NFR BLD: 83.5 % (ref 44–72)
NRBC # BLD AUTO: 0 K/UL
NRBC BLD-RTO: 0 /100 WBC
PLATELET # BLD AUTO: 167 K/UL (ref 164–446)
PMV BLD AUTO: 10.7 FL (ref 9–12.9)
RBC # BLD AUTO: 4.42 M/UL (ref 4.2–5.4)
WBC # BLD AUTO: 27.8 K/UL (ref 4.8–10.8)

## 2018-05-11 PROCEDURE — 700102 HCHG RX REV CODE 250 W/ 637 OVERRIDE(OP): Performed by: HOSPITALIST

## 2018-05-11 PROCEDURE — 770006 HCHG ROOM/CARE - MED/SURG/GYN SEMI*

## 2018-05-11 PROCEDURE — A9270 NON-COVERED ITEM OR SERVICE: HCPCS | Performed by: HOSPITALIST

## 2018-05-11 PROCEDURE — 700111 HCHG RX REV CODE 636 W/ 250 OVERRIDE (IP): Performed by: HOSPITALIST

## 2018-05-11 PROCEDURE — 36415 COLL VENOUS BLD VENIPUNCTURE: CPT

## 2018-05-11 PROCEDURE — 99232 SBSQ HOSP IP/OBS MODERATE 35: CPT | Performed by: HOSPITALIST

## 2018-05-11 PROCEDURE — 85025 COMPLETE CBC W/AUTO DIFF WBC: CPT

## 2018-05-11 RX ADMIN — HYDROCORTISONE 20 MG: 20 TABLET ORAL at 20:27

## 2018-05-11 RX ADMIN — PREGABALIN 150 MG: 75 CAPSULE ORAL at 20:28

## 2018-05-11 RX ADMIN — HYDROCORTISONE 20 MG: 20 TABLET ORAL at 14:45

## 2018-05-11 RX ADMIN — OXYCODONE HYDROCHLORIDE 10 MG: 10 TABLET ORAL at 13:10

## 2018-05-11 RX ADMIN — MIDODRINE HYDROCHLORIDE 10 MG: 5 TABLET ORAL at 08:16

## 2018-05-11 RX ADMIN — MIDODRINE HYDROCHLORIDE 10 MG: 5 TABLET ORAL at 17:07

## 2018-05-11 RX ADMIN — PREGABALIN 150 MG: 75 CAPSULE ORAL at 08:16

## 2018-05-11 RX ADMIN — OXYCODONE HYDROCHLORIDE 10 MG: 10 TABLET ORAL at 00:20

## 2018-05-11 RX ADMIN — MIDODRINE HYDROCHLORIDE 10 MG: 5 TABLET ORAL at 20:27

## 2018-05-11 RX ADMIN — OXYCODONE HYDROCHLORIDE 10 MG: 10 TABLET ORAL at 04:26

## 2018-05-11 RX ADMIN — HYDROCORTISONE 20 MG: 20 TABLET ORAL at 08:18

## 2018-05-11 RX ADMIN — HYDROMORPHONE HYDROCHLORIDE 0.5 MG: 10 INJECTION, SOLUTION INTRAMUSCULAR; INTRAVENOUS; SUBCUTANEOUS at 02:52

## 2018-05-11 RX ADMIN — LEVETIRACETAM 500 MG: 500 TABLET, FILM COATED ORAL at 08:16

## 2018-05-11 RX ADMIN — LEVETIRACETAM 500 MG: 500 TABLET, FILM COATED ORAL at 20:28

## 2018-05-11 RX ADMIN — ENOXAPARIN SODIUM 40 MG: 100 INJECTION SUBCUTANEOUS at 08:16

## 2018-05-11 RX ADMIN — OXYCODONE HYDROCHLORIDE 10 MG: 10 TABLET ORAL at 20:27

## 2018-05-11 RX ADMIN — MIDODRINE HYDROCHLORIDE 10 MG: 5 TABLET ORAL at 13:10

## 2018-05-11 RX ADMIN — ASPIRIN 81 MG: 81 TABLET, CHEWABLE ORAL at 08:16

## 2018-05-11 ASSESSMENT — PAIN SCALES - GENERAL
PAINLEVEL_OUTOF10: 8
PAINLEVEL_OUTOF10: 10
PAINLEVEL_OUTOF10: 0
PAINLEVEL_OUTOF10: 2
PAINLEVEL_OUTOF10: 0
PAINLEVEL_OUTOF10: 10
PAINLEVEL_OUTOF10: 5
PAINLEVEL_OUTOF10: 7
PAINLEVEL_OUTOF10: 6

## 2018-05-11 ASSESSMENT — ENCOUNTER SYMPTOMS
NEUROLOGICAL NEGATIVE: 1
DIZZINESS: 0
CONSTITUTIONAL NEGATIVE: 1
SEIZURES: 0
MUSCULOSKELETAL NEGATIVE: 1
CARDIOVASCULAR NEGATIVE: 1
HEADACHES: 0
GASTROINTESTINAL NEGATIVE: 1
RESPIRATORY NEGATIVE: 1
TREMORS: 0
FOCAL WEAKNESS: 0
SHORTNESS OF BREATH: 0
EYES NEGATIVE: 1
TINGLING: 0

## 2018-05-11 NOTE — PROGRESS NOTES
"Pt tearful this afternoon. States she is tired of being dizzy and tired of being in hospital. Comforted patient and reinforced how far she has come since admission. Also upset about \"not having a shower for days\". Apologized and assisted patient to shower and new linen. Tolerated without event. Back in bed resting.  "

## 2018-05-11 NOTE — PROGRESS NOTES
One time dose of .5mg of IV dilaudid administered for severe generalized pain. On 2 liters of oxygen, sats above 90 percent, continuous pulse oximetry. Will monitor.

## 2018-05-11 NOTE — PROGRESS NOTES
Pt resting in bed at this time. Pain meds administered. On 2 liters of oxygen, sats within normal limits. Continues to report left sided neck, flank and leg pain. Bed alarm on. Frequent rounding by staff. Will monitor.

## 2018-05-11 NOTE — PROGRESS NOTES
[facility] Hospitalist Progress Note    Date of Service: 2018    Chief Complaint  53 y.o. female admitted 4/10/2018 with altered mental status who was found to have evidence of multiple acute strokes and new onset seizure disorder    Interval Problem Update  No recent seizures  No dizziness  BP on low side but no associated symptoms   Axox3 with ongoing poor memory    Consultants/Specialty  Neurology    Disposition  Difficult placement in [place] - social work coordinating with daughters to arrange increased  Care/ support at home        Review of Systems   Constitutional: Negative.    HENT: Negative.    Eyes: Negative.    Respiratory: Negative.  Negative for shortness of breath.    Cardiovascular: Negative.  Negative for chest pain.   Gastrointestinal: Negative.    Genitourinary: Negative.    Musculoskeletal: Negative.    Skin: Negative.    Neurological: Negative.  Negative for dizziness, tingling, tremors, focal weakness, seizures and headaches.   Endo/Heme/Allergies: Negative.       Physical Exam  Laboratory/Imaging   Hemodynamics  Temp (24hrs), Av.1 °C (98.7 °F), Min:36.3 °C (97.4 °F), Max:37.5 °C (99.5 °F)   Temperature: 37.2 °C (98.9 °F)  Pulse  Av.3  Min: 12  Max: 157    Blood Pressure: (!) 94/55     Respiratory      Respiration: 15, Pulse Oximetry: 98 %     Work Of Breathing / Effort: Mild  RUL Breath Sounds: Clear, RML Breath Sounds: Clear, RLL Breath Sounds: Clear, JOLYNN Breath Sounds: Clear, LLL Breath Sounds: Clear    Fluids    Intake/Output Summary (Last 24 hours) at 18 0849  Last data filed at 18 0620   Gross per 24 hour   Intake              450 ml   Output                0 ml   Net              450 ml       Nutrition  Orders Placed This Encounter   Procedures   • DIET ORDER     Standing Status:   Standing     Number of Occurrences:   1     Order Specific Question:   Diet:     Answer:   Regular [1]     Order Specific Question:   Texture/Fiber modifications:     Answer:   Dysphagia  3(Mechanical Soft)specify fluid consistency(question 6) [3]     Order Specific Question:   Consistency/Fluid modifications:     Answer:   Thin Liquids [3]     Physical Exam   Constitutional: She is oriented to person, place, and time. She appears well-developed and well-nourished.   HENT:   Head: Normocephalic and atraumatic.   Eyes: Conjunctivae are normal. Right eye exhibits no discharge. Left eye exhibits no discharge. No scleral icterus.   Neck: Normal range of motion. No JVD present.   Cardiovascular: Normal rate and regular rhythm.  Exam reveals no gallop and no friction rub.    No murmur heard.  Pulmonary/Chest: Effort normal and breath sounds normal. No stridor. No respiratory distress. She has no wheezes. She has no rales. She exhibits no tenderness.   Abdominal: Soft. Bowel sounds are normal. She exhibits no distension and no mass. There is no tenderness. There is no rebound and no guarding.   Musculoskeletal: Normal range of motion. She exhibits no edema or deformity.   Lymphadenopathy:     She has no cervical adenopathy.   Neurological: She is alert and oriented to person, place, and time. She has normal reflexes. No cranial nerve deficit. Coordination normal.   Skin: Skin is warm and dry. No rash noted. No erythema. No pallor.   Psychiatric: She has a normal mood and affect. Her behavior is normal. Judgment and thought content normal.       Recent Labs      05/09/18   0302  05/10/18   0301  05/11/18   0351   WBC  17.0*  16.1*  27.8*   RBC  4.62  4.35  4.42   HEMOGLOBIN  14.0  13.3  13.1   HEMATOCRIT  41.7  39.4  40.5   MCV  90.3  90.6  91.6   MCH  30.3  30.6  29.6   MCHC  33.6  33.8  32.3*   RDW  42.8  43.4  44.3   PLATELETCT  205  223  167   MPV  11.0  11.1  10.7                          Assessment/Plan     * Acute CVA (cerebrovascular accident) (HCC)   Assessment & Plan    Multifocal ischemic infarcts  Neurology has signed off  Carotid US normal.  Continue aspirin  LACI w bubble study showed PFO.   Patient wishing to treat medically at this time.  Anticipate loop recorder placement prior to d/c to r/o embolic origin.  She passed her swallow eval and has been tolerating a diet.  Anticipate need for SNF at d/c.                Acute respiratory failure with hypoxia (HCC)- (present on admission)   Assessment & Plan    Likely secondary to seizure.  Required intubation on 4/26 and extubation on 4/27.  Pulmonology has signed off  Resolved        Hypotension- (present on admission)   Assessment & Plan    Has had chronic intermittent episodes of unresponsiveness while ambulating secondary to orthostatic hypotension.   None recently  Continue midodrine and hydrocortisone.            Seizure disorder as sequela of cerebrovascular accident (HCC)- (present on admission)   Assessment & Plan    Neurology has signed off  No further seizure activity  Continue keppra.            Drug overdose - unintentional- (present on admission)   Assessment & Plan    Buprenorphine patch removed PTA.   Patient was on high doses of narcotics at home for chronic pain and fibromyalgia  Continue conservative use of narcotics, tapering down as tolerated                 Weakness   Assessment & Plan    Secondary to above.  PT/OT following.   Pending SNF placement.        Normocytic anemia- (present on admission)   Assessment & Plan    Stable.        Chronic pain syndrome- (present on admission)   Assessment & Plan    Pain controlled.  Tapering down as tolerated  Possible unintentional narcotics overdose suspected on admit          Campylobacter diarrhea- (present on admission)   Assessment & Plan    Campylobacter on cultures from transferring facility. C-diff negative here on 4/10/18.  Resolved.            Hyperglycemia   Assessment & Plan    A1C normal at 5.4. No hx of Dm.  This likely steroid induced.   Following            Leukocytosis- (present on admission)   Assessment & Plan    WBC remains elevated but is decreasing  Completed Unasyn and  Luke 4/16.  Procalcitonin was normal.  Clinically stable  Likely secondary to steroids.          Elevated brain natriuretic peptide (BNP) level   Assessment & Plan    BNP 8000s on arrival.   TTE showed normal diastolic function & EF 70%.  Remains euvolemic on exam            Elevated troponin- (present on admission)   Assessment & Plan    No CP or hx of CAD.  Normal echocardiogram.  Repeat trended down.   No need for further studies at this time, consider stress test as outpatient when other issues stable        Autonomic neuropathy- (present on admission)   Assessment & Plan    On midodrine and hydrocortisone  Following          Anorexia- (present on admission)   Assessment & Plan    Continue to encourage oral intake.  Appropriate caloric intake discussed with patient and family.  She has required a PEG in the past.  Dietician following.        Aspiration pneumonia (HCC)- (present on admission)   Assessment & Plan    Completed a course of Unasyn  Normal CT of chest  Clinically resolved with normal pulm exam and no pulm sx                Quality-Core Measures

## 2018-05-11 NOTE — DISCHARGE PLANNING
Spoke with NAZANIN Hernandez in regards to VM from Lissette @ [facility] with request for discharge planning. Lissette can be reached @ [phone] and Fax # [phone].

## 2018-05-11 NOTE — CARE PLAN
Problem: Safety  Goal: Will remain free from injury  Outcome: PROGRESSING AS EXPECTED  Calls for help. Someone is always with her when she is up in the bathroom.    Problem: Pain Management  Goal: Pain level will decrease to patient's comfort goal  Outcome: PROGRESSING AS EXPECTED  States pain meds help, oxy given. Prn administration explained.

## 2018-05-11 NOTE — CARE PLAN
Problem: Communication  Goal: The ability to communicate needs accurately and effectively will improve    Intervention: [place] patient and significant other/support system to call light to alert staff of needs  Able to make needs known, instructed to call for assistance      Problem: Safety  Goal: Will remain free from injury    Intervention: Provide assistance with mobility  2 person assist due to hypotension       Problem: Pain Management  Goal: Pain level will decrease to patient's comfort goal    Intervention: Educate and implement non-pharmacologic comfort measures. Examples: relaxation, distration, play therapy, activity therapy, massage, etc.  No current complaints of pain, will monitor for changes

## 2018-05-11 NOTE — DISCHARGE PLANNING
NAZANIN notified by Vilma cuellar RN that Sue from pt's insurance called and is requesting call back. NAZANIN called Sue and learned that pt may qualify for a gentry for SNF placement at [facility] in [place], [place]. NAZANIN faxed requested documentation for referral to provided fax number. Referral to be reviewed today. Per Sue, a decision might be made later today or on Monday.     NAZANIN called and notified pt's daughter, Ling on the above. NAZANIN also discussed alternate d/c plan in case pt does not qualify for gentry. NAZANIN discussed plan for pt to d/c home with caregiver support. Daughter would prefer pt to transfer to SNF before returning home.      Plan: NAZANIN to update daughter on referral status     Sue  PH: [phone]  FAX: [phone]

## 2018-05-12 PROCEDURE — 700102 HCHG RX REV CODE 250 W/ 637 OVERRIDE(OP): Performed by: HOSPITALIST

## 2018-05-12 PROCEDURE — A9270 NON-COVERED ITEM OR SERVICE: HCPCS | Performed by: HOSPITALIST

## 2018-05-12 PROCEDURE — 700111 HCHG RX REV CODE 636 W/ 250 OVERRIDE (IP): Performed by: HOSPITALIST

## 2018-05-12 PROCEDURE — 770006 HCHG ROOM/CARE - MED/SURG/GYN SEMI*

## 2018-05-12 PROCEDURE — 99232 SBSQ HOSP IP/OBS MODERATE 35: CPT | Performed by: HOSPITALIST

## 2018-05-12 RX ORDER — MAGNESIUM SULFATE HEPTAHYDRATE 40 MG/ML
2 INJECTION, SOLUTION INTRAVENOUS ONCE
Status: COMPLETED | OUTPATIENT
Start: 2018-05-12 | End: 2018-05-12

## 2018-05-12 RX ADMIN — MIDODRINE HYDROCHLORIDE 10 MG: 5 TABLET ORAL at 17:14

## 2018-05-12 RX ADMIN — MIDODRINE HYDROCHLORIDE 10 MG: 5 TABLET ORAL at 21:20

## 2018-05-12 RX ADMIN — HYDROCORTISONE 20 MG: 20 TABLET ORAL at 08:46

## 2018-05-12 RX ADMIN — MAGNESIUM SULFATE IN WATER 2 G: 40 INJECTION, SOLUTION INTRAVENOUS at 16:02

## 2018-05-12 RX ADMIN — MIDODRINE HYDROCHLORIDE 10 MG: 5 TABLET ORAL at 08:46

## 2018-05-12 RX ADMIN — OXYCODONE HYDROCHLORIDE 10 MG: 10 TABLET ORAL at 21:20

## 2018-05-12 RX ADMIN — LEVETIRACETAM 500 MG: 500 TABLET, FILM COATED ORAL at 21:20

## 2018-05-12 RX ADMIN — OXYCODONE HYDROCHLORIDE 10 MG: 10 TABLET ORAL at 13:11

## 2018-05-12 RX ADMIN — PREGABALIN 150 MG: 75 CAPSULE ORAL at 08:46

## 2018-05-12 RX ADMIN — OXYCODONE HYDROCHLORIDE 10 MG: 10 TABLET ORAL at 08:45

## 2018-05-12 RX ADMIN — HYDROCORTISONE 20 MG: 20 TABLET ORAL at 21:20

## 2018-05-12 RX ADMIN — PREGABALIN 150 MG: 75 CAPSULE ORAL at 21:20

## 2018-05-12 RX ADMIN — OXYCODONE HYDROCHLORIDE 10 MG: 10 TABLET ORAL at 17:14

## 2018-05-12 RX ADMIN — ASPIRIN 81 MG: 81 TABLET, CHEWABLE ORAL at 08:46

## 2018-05-12 RX ADMIN — HYDROCORTISONE 20 MG: 20 TABLET ORAL at 16:02

## 2018-05-12 RX ADMIN — LEVETIRACETAM 500 MG: 500 TABLET, FILM COATED ORAL at 08:46

## 2018-05-12 RX ADMIN — ENOXAPARIN SODIUM 40 MG: 100 INJECTION SUBCUTANEOUS at 08:46

## 2018-05-12 RX ADMIN — OXYCODONE HYDROCHLORIDE 10 MG: 10 TABLET ORAL at 00:32

## 2018-05-12 RX ADMIN — MIDODRINE HYDROCHLORIDE 10 MG: 5 TABLET ORAL at 12:41

## 2018-05-12 ASSESSMENT — PAIN SCALES - GENERAL
PAINLEVEL_OUTOF10: 8
PAINLEVEL_OUTOF10: 8
PAINLEVEL_OUTOF10: 7
PAINLEVEL_OUTOF10: 0
PAINLEVEL_OUTOF10: 2
PAINLEVEL_OUTOF10: 6

## 2018-05-12 ASSESSMENT — ENCOUNTER SYMPTOMS
MUSCULOSKELETAL NEGATIVE: 1
TINGLING: 0
RESPIRATORY NEGATIVE: 1
SEIZURES: 0
TREMORS: 0
SHORTNESS OF BREATH: 0
HEADACHES: 0
NEUROLOGICAL NEGATIVE: 1
CONSTITUTIONAL NEGATIVE: 1
DIZZINESS: 0
GASTROINTESTINAL NEGATIVE: 1
CARDIOVASCULAR NEGATIVE: 1
EYES NEGATIVE: 1
FOCAL WEAKNESS: 0

## 2018-05-12 NOTE — CARE PLAN
Problem: Communication  Goal: The ability to communicate needs accurately and effectively will improve    Intervention: [place] patient and significant other/support system to call light to alert staff of needs  Able to make needs known, instructed to call for assistance       Problem: Safety  Goal: Will remain free from injury    Intervention: Provide assistance with mobility  1 assist to commode, instructed to call for assistance

## 2018-05-12 NOTE — PROGRESS NOTES
Assumed care of pt. Able to make needs known. A/Ox4. Regular dysphagia 3 with thins. 1 assist to commode. No complaints of pain this AM. POC SNF v. Home. Bed alarm on. Call light in reach, bed in low position, will continue hourly rounding.

## 2018-05-12 NOTE — CARE PLAN
Problem: Safety  Goal: Will remain free from injury  Outcome: PROGRESSING AS EXPECTED  Pt calls for help. Bed alarm on. Reminded about importance for calling help, Call light within reach.    Problem: Pain Management  Goal: Pain level will decrease to patient's comfort goal  Outcome: PROGRESSING AS EXPECTED  Pain management plan discussed. Non medication methods of easing pain discussed, such as blankets, pillows, relaxation techniques etc

## 2018-05-12 NOTE — PROGRESS NOTES
Pt resting in bed at this time. She is alert and oriented times four. Pain meds administered. On continuous pulse oximetry, wears 2 liters of oxygen as needed. Makes needs known. Staff monitoring closely.

## 2018-05-12 NOTE — PROGRESS NOTES
[facility] Hospitalist Progress Note    Date of Service: 2018    Chief Complaint  53 y.o. female admitted 4/10/2018 with altered mental status who was found to have evidence of multiple acute strokes and new onset seizure disorder    Interval Problem Update  Remains axox3 with poor recall  No recent seizures  Denies dizziness  BP remains intermittently low but no recent near syncopal events or sx      Consultants/Specialty  Neurology    Disposition  Difficult placement in [place] - social work coordinating with family      Review of Systems   Constitutional: Negative.    HENT: Negative.    Eyes: Negative.    Respiratory: Negative.  Negative for shortness of breath.    Cardiovascular: Negative.  Negative for chest pain.   Gastrointestinal: Negative.    Genitourinary: Negative.    Musculoskeletal: Negative.    Skin: Negative.    Neurological: Negative.  Negative for dizziness, tingling, tremors, focal weakness, seizures and headaches.   Endo/Heme/Allergies: Negative.       Physical Exam  Laboratory/Imaging   Hemodynamics  Temp (24hrs), Av.6 °C (97.9 °F), Min:36.1 °C (97 °F), Max:37 °C (98.6 °F)   Temperature: 36.1 °C (97 °F)  Pulse  Av.9  Min: 12  Max: 157    Blood Pressure: 101/54     Respiratory      Respiration: 16, Pulse Oximetry: 97 %     Work Of Breathing / Effort: Mild  RUL Breath Sounds: Clear, RML Breath Sounds: Clear, RLL Breath Sounds: Clear, JOLYNN Breath Sounds: Clear, LLL Breath Sounds: Clear    Fluids    Intake/Output Summary (Last 24 hours) at 18 1011  Last data filed at 18 0800   Gross per 24 hour   Intake              975 ml   Output                0 ml   Net              975 ml       Nutrition  Orders Placed This Encounter   Procedures   • DIET ORDER     Standing Status:   Standing     Number of Occurrences:   1     Order Specific Question:   Diet:     Answer:   Regular [1]     Order Specific Question:   Texture/Fiber modifications:     Answer:   Dysphagia 3(Mechanical Soft)specify  fluid consistency(question 6) [3]     Order Specific Question:   Consistency/Fluid modifications:     Answer:   Thin Liquids [3]     Physical Exam   Constitutional: She is oriented to person, place, and time. She appears well-developed and well-nourished.   HENT:   Head: Normocephalic and atraumatic.   Eyes: Conjunctivae are normal. Right eye exhibits no discharge. Left eye exhibits no discharge. No scleral icterus.   Neck: Normal range of motion. No JVD present.   Cardiovascular: Normal rate and regular rhythm.  Exam reveals no gallop and no friction rub.    No murmur heard.  Pulmonary/Chest: Effort normal and breath sounds normal. No stridor. No respiratory distress. She has no wheezes. She has no rales. She exhibits no tenderness.   Abdominal: Soft. Bowel sounds are normal. She exhibits no distension and no mass. There is no tenderness. There is no rebound and no guarding.   Musculoskeletal: Normal range of motion. She exhibits no edema or deformity.   Lymphadenopathy:     She has no cervical adenopathy.   Neurological: She is alert and oriented to person, place, and time. She has normal reflexes. No cranial nerve deficit. Coordination normal.   Skin: Skin is warm and dry. No rash noted. No erythema. No pallor.   Psychiatric: She has a normal mood and affect. Her behavior is normal. Judgment and thought content normal.       Recent Labs      05/10/18   0301  05/11/18   0351   WBC  16.1*  27.8*   RBC  4.35  4.42   HEMOGLOBIN  13.3  13.1   HEMATOCRIT  39.4  40.5   MCV  90.6  91.6   MCH  30.6  29.6   MCHC  33.8  32.3*   RDW  43.4  44.3   PLATELETCT  223  167   MPV  11.1  10.7                          Assessment/Plan     * Acute CVA (cerebrovascular accident) (HCC)   Assessment & Plan    Multifocal ischemic infarcts  Neurology has signed off  Carotid US normal.  Continue aspirin  LACI w bubble study showed PFO.  Patient wishing to treat medically at this time.  Anticipate loop recorder placement prior to d/c to r/o  embolic origin.  She passed her swallow eval and has been tolerating a diet.  SNF placement pending                Acute respiratory failure with hypoxia (HCC)- (present on admission)   Assessment & Plan    Likely secondary to seizure.  Required intubation on 4/26 and extubation on 4/27.  Pulmonology has signed off  Resolved        Hypotension- (present on admission)   Assessment & Plan    Has had chronic intermittent episodes of unresponsiveness while ambulating secondary to orthostatic hypotension.   None recently  Continue midodrine  Tolerating slow taper of hydrocortisone.            Seizure disorder as sequela of cerebrovascular accident (HCC)- (present on admission)   Assessment & Plan    Neurology has signed off  No further seizure activity  Continue keppra.            Drug overdose - unintentional- (present on admission)   Assessment & Plan    Buprenorphine patch removed PTA.   Patient was on high doses of narcotics at home for chronic pain and fibromyalgia  Continue conservative use of narcotics, tapering down as tolerated                 Weakness   Assessment & Plan    Secondary to above.  PT/OT following.   Pending SNF placement.        Normocytic anemia- (present on admission)   Assessment & Plan    Stable.        Chronic pain syndrome- (present on admission)   Assessment & Plan    Pain controlled.  Tapering down as tolerated  Possible unintentional narcotics overdose suspected on admit          Campylobacter diarrhea- (present on admission)   Assessment & Plan    Campylobacter on cultures from transferring facility. C-diff negative here on 4/10/18.  Resolved.            Hyperglycemia   Assessment & Plan    A1C normal at 5.4. No hx of Dm.  This likely steroid induced.   Following            Leukocytosis- (present on admission)   Assessment & Plan    WBC remains elevated but is decreasing  Completed Unasyn and Azithro 4/16.  Procalcitonin was normal.  Clinically stable  Likely secondary to steroids.           Elevated brain natriuretic peptide (BNP) level   Assessment & Plan    BNP 8000s on arrival.   TTE showed normal diastolic function & EF 70%.  Remains euvolemic on exam            Elevated troponin- (present on admission)   Assessment & Plan    No CP or hx of CAD.  Normal echocardiogram.  Repeat trended down.   No need for further studies at this time, consider stress test as outpatient when other issues stable        Autonomic neuropathy- (present on admission)   Assessment & Plan    On midodrine and hydrocortisone  Following          Anorexia- (present on admission)   Assessment & Plan    Continue to encourage oral intake.  Appropriate caloric intake discussed with patient and family.  She has required a PEG in the past.  Dietician following.        Aspiration pneumonia (HCC)- (present on admission)   Assessment & Plan    Completed a course of Unasyn  Normal CT of chest  Normal exam                Quality-Core Measures

## 2018-05-13 PROCEDURE — 700102 HCHG RX REV CODE 250 W/ 637 OVERRIDE(OP): Performed by: HOSPITALIST

## 2018-05-13 PROCEDURE — A9270 NON-COVERED ITEM OR SERVICE: HCPCS | Performed by: HOSPITALIST

## 2018-05-13 PROCEDURE — 770006 HCHG ROOM/CARE - MED/SURG/GYN SEMI*

## 2018-05-13 PROCEDURE — 700111 HCHG RX REV CODE 636 W/ 250 OVERRIDE (IP): Performed by: HOSPITALIST

## 2018-05-13 PROCEDURE — 99232 SBSQ HOSP IP/OBS MODERATE 35: CPT | Performed by: HOSPITALIST

## 2018-05-13 RX ADMIN — MIDODRINE HYDROCHLORIDE 10 MG: 5 TABLET ORAL at 12:08

## 2018-05-13 RX ADMIN — HYDROCORTISONE 20 MG: 20 TABLET ORAL at 20:05

## 2018-05-13 RX ADMIN — LEVETIRACETAM 500 MG: 500 TABLET, FILM COATED ORAL at 07:49

## 2018-05-13 RX ADMIN — MIDODRINE HYDROCHLORIDE 10 MG: 5 TABLET ORAL at 07:50

## 2018-05-13 RX ADMIN — OXYCODONE HYDROCHLORIDE 10 MG: 10 TABLET ORAL at 02:16

## 2018-05-13 RX ADMIN — ENOXAPARIN SODIUM 40 MG: 100 INJECTION SUBCUTANEOUS at 07:50

## 2018-05-13 RX ADMIN — OXYCODONE HYDROCHLORIDE 5 MG: 5 TABLET ORAL at 20:06

## 2018-05-13 RX ADMIN — MIDODRINE HYDROCHLORIDE 10 MG: 5 TABLET ORAL at 20:06

## 2018-05-13 RX ADMIN — PREGABALIN 150 MG: 75 CAPSULE ORAL at 07:50

## 2018-05-13 RX ADMIN — ASPIRIN 81 MG: 81 TABLET, CHEWABLE ORAL at 07:50

## 2018-05-13 RX ADMIN — OXYCODONE HYDROCHLORIDE 10 MG: 10 TABLET ORAL at 06:54

## 2018-05-13 RX ADMIN — OXYCODONE HYDROCHLORIDE 10 MG: 10 TABLET ORAL at 12:08

## 2018-05-13 RX ADMIN — PREGABALIN 150 MG: 75 CAPSULE ORAL at 20:06

## 2018-05-13 RX ADMIN — HYDROCORTISONE 20 MG: 20 TABLET ORAL at 07:50

## 2018-05-13 RX ADMIN — LEVETIRACETAM 500 MG: 500 TABLET, FILM COATED ORAL at 20:06

## 2018-05-13 RX ADMIN — HYDROCORTISONE 20 MG: 20 TABLET ORAL at 15:32

## 2018-05-13 RX ADMIN — MIDODRINE HYDROCHLORIDE 10 MG: 5 TABLET ORAL at 17:14

## 2018-05-13 ASSESSMENT — PAIN SCALES - GENERAL
PAINLEVEL_OUTOF10: 7
PAINLEVEL_OUTOF10: 7
PAINLEVEL_OUTOF10: 6
PAINLEVEL_OUTOF10: 5
PAINLEVEL_OUTOF10: 1
PAINLEVEL_OUTOF10: 6
PAINLEVEL_OUTOF10: 5
PAINLEVEL_OUTOF10: 0
PAINLEVEL_OUTOF10: 5

## 2018-05-13 ASSESSMENT — ENCOUNTER SYMPTOMS
CARDIOVASCULAR NEGATIVE: 1
SHORTNESS OF BREATH: 0
CONSTITUTIONAL NEGATIVE: 1
EYES NEGATIVE: 1
HEADACHES: 0
TREMORS: 0
GASTROINTESTINAL NEGATIVE: 1
TINGLING: 0
FOCAL WEAKNESS: 0
RESPIRATORY NEGATIVE: 1
NEUROLOGICAL NEGATIVE: 1
DIZZINESS: 0
MUSCULOSKELETAL NEGATIVE: 1
SEIZURES: 0

## 2018-05-13 NOTE — CARE PLAN
Problem: Pain Management  Goal: Pain level will decrease to patient's comfort goal  Pain assessed. Medicated per MAR with + results.      Problem: Urinary Elimination:  Goal: Ability to reestablish a normal urinary elimination pattern will improve  Pt + for void into BSC

## 2018-05-13 NOTE — PROGRESS NOTES
[facility]ist Progress Note    Date of Service: 2018    Chief Complaint  53 y.o. female admitted 4/10/2018 with altered mental status who was found to have evidence of multiple acute strokes and new onset seizure disorder    Interval Problem Update  Patient reported significant left leg cramping last evening, it resolved with magneisum  Today doing well, denies pain  No dizziness  ROS otherwise negative      Consultants/Specialty  Neurology    Disposition  Difficult placement in [place] - social work coordinating with family      Review of Systems   Constitutional: Negative.    HENT: Negative.    Eyes: Negative.    Respiratory: Negative.  Negative for shortness of breath.    Cardiovascular: Negative.  Negative for chest pain.   Gastrointestinal: Negative.    Genitourinary: Negative.    Musculoskeletal: Negative.    Skin: Negative.    Neurological: Negative.  Negative for dizziness, tingling, tremors, focal weakness, seizures and headaches.   Endo/Heme/Allergies: Negative.       Physical Exam  Laboratory/Imaging   Hemodynamics  Temp (24hrs), Av.5 °C (97.7 °F), Min:36.2 °C (97.2 °F), Max:36.8 °C (98.3 °F)   Temperature: 36.4 °C (97.6 °F)  Pulse  Av.6  Min: 12  Max: 157    Blood Pressure: (!) 95/57 (RN notified)     Respiratory      Respiration: 15, Pulse Oximetry: 97 %        RUL Breath Sounds: Clear, RML Breath Sounds: Clear, RLL Breath Sounds: Clear, JOLYNN Breath Sounds: Clear, LLL Breath Sounds: Clear    Fluids    Intake/Output Summary (Last 24 hours) at 18 1334  Last data filed at 18 0800   Gross per 24 hour   Intake              200 ml   Output                0 ml   Net              200 ml       Nutrition  Orders Placed This Encounter   Procedures   • DIET ORDER     Standing Status:   Standing     Number of Occurrences:   1     Order Specific Question:   Diet:     Answer:   Regular [1]     Order Specific Question:   Texture/Fiber modifications:     Answer:   Dysphagia 3(Mechanical  Soft)specify fluid consistency(question 6) [3]     Order Specific Question:   Consistency/Fluid modifications:     Answer:   Thin Liquids [3]     Physical Exam   Constitutional: She is oriented to person, place, and time. She appears well-developed and well-nourished.   HENT:   Head: Normocephalic and atraumatic.   Eyes: Conjunctivae are normal. Right eye exhibits no discharge. Left eye exhibits no discharge. No scleral icterus.   Neck: Normal range of motion. No JVD present.   Cardiovascular: Normal rate and regular rhythm.  Exam reveals no gallop and no friction rub.    No murmur heard.  Pulmonary/Chest: Effort normal and breath sounds normal. No stridor. No respiratory distress. She has no wheezes. She has no rales. She exhibits no tenderness.   Abdominal: Soft. Bowel sounds are normal. She exhibits no distension and no mass. There is no tenderness. There is no rebound and no guarding.   Musculoskeletal: Normal range of motion. She exhibits no edema or deformity.   Lymphadenopathy:     She has no cervical adenopathy.   Neurological: She is alert and oriented to person, place, and time. She has normal reflexes. No cranial nerve deficit. Coordination normal.   Skin: Skin is warm and dry. No rash noted. No erythema. No pallor.   Psychiatric: She has a normal mood and affect. Her behavior is normal. Judgment and thought content normal.       Recent Labs      05/11/18   0351   WBC  27.8*   RBC  4.42   HEMOGLOBIN  13.1   HEMATOCRIT  40.5   MCV  91.6   MCH  29.6   MCHC  32.3*   RDW  44.3   PLATELETCT  167   MPV  10.7                          Assessment/Plan     * Acute CVA (cerebrovascular accident) (HCC)   Assessment & Plan    Multifocal ischemic infarcts  Neurology has signed off  Carotid US normal.  Continue aspirin  LACI w bubble study showed PFO.  Patient wishing to treat medically at this time.  Anticipate loop recorder placement prior to d/c to r/o embolic origin.  She passed her swallow eval and has been  tolerating a diet.  SNF placement pending                Acute respiratory failure with hypoxia (HCC)- (present on admission)   Assessment & Plan    Likely secondary to seizure.  Required intubation on 4/26 and extubation on 4/27.  Pulmonology has signed off  Resolved        Hypotension- (present on admission)   Assessment & Plan    Has had chronic intermittent episodes of unresponsiveness while ambulating secondary to orthostatic hypotension.   None recently  Continue midodrine  Tolerating slow taper of hydrocortisone.            Seizure disorder as sequela of cerebrovascular accident (HCC)- (present on admission)   Assessment & Plan    Neurology has signed off  No further seizure activity  Continue keppra.            Drug overdose - unintentional- (present on admission)   Assessment & Plan    Buprenorphine patch removed PTA.   Patient was on high doses of narcotics at home for chronic pain and fibromyalgia  Continue conservative use of narcotics, tapering down as tolerated                 Weakness   Assessment & Plan    Secondary to above.  PT/OT following.   Pending SNF placement.        Normocytic anemia- (present on admission)   Assessment & Plan    Stable.        Chronic pain syndrome- (present on admission)   Assessment & Plan    Pain controlled.  Tapering down as tolerated  Possible unintentional narcotics overdose suspected on admit          Campylobacter diarrhea- (present on admission)   Assessment & Plan    Campylobacter on cultures from transferring facility. C-diff negative here on 4/10/18.  Resolved.            Hyperglycemia   Assessment & Plan    A1C normal at 5.4. No hx of Dm.  This likely steroid induced.   Following            Leukocytosis- (present on admission)   Assessment & Plan    WBC remains elevated but is decreasing  Completed Unasyn and Azithro 4/16.  Procalcitonin was normal.  Clinically stable  Likely secondary to steroids.          Elevated brain natriuretic peptide (BNP) level    Assessment & Plan    BNP 8000s on arrival.   TTE showed normal diastolic function & EF 70%.  Remains euvolemic on exam            Elevated troponin- (present on admission)   Assessment & Plan    No CP or hx of CAD.  Normal echocardiogram.  Repeat trended down.   No need for further studies at this time, consider stress test as outpatient when other issues stable        Autonomic neuropathy- (present on admission)   Assessment & Plan    On midodrine and hydrocortisone  Following          Anorexia- (present on admission)   Assessment & Plan    Continue to encourage oral intake.  Appropriate caloric intake discussed with patient and family.  She has required a PEG in the past.  Dietician following.        Aspiration pneumonia (HCC)- (present on admission)   Assessment & Plan    Completed a course of Unasyn  Normal CT of chest  Normal exam                Quality-Core Measures

## 2018-05-13 NOTE — CARE PLAN
Problem: Communication  Goal: The ability to communicate needs accurately and effectively will improve    Intervention: [place] patient and significant other/support system to call light to alert staff of needs  Able to make needs known, instructed to call for assistance      Problem: Urinary Elimination:  Goal: Ability to reestablish a normal urinary elimination pattern will improve    Intervention: Assist patient to sit on commode or toilet for voiding  1 assist to BSC

## 2018-05-13 NOTE — PROGRESS NOTES
Assumed care of pt. Able to make needs known, instructed to call for assistance. A/Ox4. PIV in right hand 20g, NS at TKO. Dysphagia 3 with thins. Pain well controlled for now. SNF v. Home. Bed alarm on.  on. Call light in reach, bed in low position, will continue hourly rounding.

## 2018-05-13 NOTE — PROGRESS NOTES
Pt A+Ox4. VALENCIA. c/o pain, medicated per MAR with + results. + for void, pt is a one person assist to the bedside commode.  in use to monitor oxygen status. Pt is bradycardic at times. All questions answered regarding POC.

## 2018-05-14 PROCEDURE — 700111 HCHG RX REV CODE 636 W/ 250 OVERRIDE (IP): Performed by: HOSPITALIST

## 2018-05-14 PROCEDURE — A9270 NON-COVERED ITEM OR SERVICE: HCPCS | Performed by: HOSPITALIST

## 2018-05-14 PROCEDURE — 700102 HCHG RX REV CODE 250 W/ 637 OVERRIDE(OP): Performed by: HOSPITALIST

## 2018-05-14 PROCEDURE — 99232 SBSQ HOSP IP/OBS MODERATE 35: CPT | Performed by: HOSPITALIST

## 2018-05-14 PROCEDURE — 97535 SELF CARE MNGMENT TRAINING: CPT

## 2018-05-14 PROCEDURE — 770006 HCHG ROOM/CARE - MED/SURG/GYN SEMI*

## 2018-05-14 RX ADMIN — MIDODRINE HYDROCHLORIDE 10 MG: 5 TABLET ORAL at 20:22

## 2018-05-14 RX ADMIN — OXYCODONE HYDROCHLORIDE 10 MG: 10 TABLET ORAL at 12:51

## 2018-05-14 RX ADMIN — LEVETIRACETAM 500 MG: 500 TABLET, FILM COATED ORAL at 20:22

## 2018-05-14 RX ADMIN — HYDROCORTISONE 20 MG: 20 TABLET ORAL at 20:22

## 2018-05-14 RX ADMIN — HYDROCORTISONE 20 MG: 20 TABLET ORAL at 08:33

## 2018-05-14 RX ADMIN — MIDODRINE HYDROCHLORIDE 10 MG: 5 TABLET ORAL at 18:04

## 2018-05-14 RX ADMIN — PREGABALIN 150 MG: 75 CAPSULE ORAL at 08:33

## 2018-05-14 RX ADMIN — MIDODRINE HYDROCHLORIDE 10 MG: 5 TABLET ORAL at 15:29

## 2018-05-14 RX ADMIN — STANDARDIZED SENNA CONCENTRATE AND DOCUSATE SODIUM 2 TABLET: 8.6; 5 TABLET, FILM COATED ORAL at 20:22

## 2018-05-14 RX ADMIN — PREGABALIN 150 MG: 75 CAPSULE ORAL at 20:22

## 2018-05-14 RX ADMIN — ASPIRIN 81 MG: 81 TABLET, CHEWABLE ORAL at 08:33

## 2018-05-14 RX ADMIN — MIDODRINE HYDROCHLORIDE 10 MG: 5 TABLET ORAL at 08:33

## 2018-05-14 RX ADMIN — OXYCODONE HYDROCHLORIDE 5 MG: 5 TABLET ORAL at 00:37

## 2018-05-14 RX ADMIN — OXYCODONE HYDROCHLORIDE 10 MG: 10 TABLET ORAL at 20:22

## 2018-05-14 RX ADMIN — HYDROCORTISONE 20 MG: 20 TABLET ORAL at 15:29

## 2018-05-14 RX ADMIN — OXYCODONE HYDROCHLORIDE 10 MG: 10 TABLET ORAL at 09:08

## 2018-05-14 RX ADMIN — ENOXAPARIN SODIUM 40 MG: 100 INJECTION SUBCUTANEOUS at 08:33

## 2018-05-14 RX ADMIN — LEVETIRACETAM 500 MG: 500 TABLET, FILM COATED ORAL at 08:33

## 2018-05-14 ASSESSMENT — ENCOUNTER SYMPTOMS
TINGLING: 0
CARDIOVASCULAR NEGATIVE: 1
EYES NEGATIVE: 1
RESPIRATORY NEGATIVE: 1
TREMORS: 0
MUSCULOSKELETAL NEGATIVE: 1
SEIZURES: 0
CONSTITUTIONAL NEGATIVE: 1
FOCAL WEAKNESS: 0
SHORTNESS OF BREATH: 0
GASTROINTESTINAL NEGATIVE: 1
HEADACHES: 0
DIZZINESS: 0
NEUROLOGICAL NEGATIVE: 1

## 2018-05-14 ASSESSMENT — COGNITIVE AND FUNCTIONAL STATUS - GENERAL
SUGGESTED CMS G CODE MODIFIER DAILY ACTIVITY: CI
HELP NEEDED FOR BATHING: A LITTLE
DAILY ACTIVITIY SCORE: 23

## 2018-05-14 ASSESSMENT — PAIN SCALES - GENERAL
PAINLEVEL_OUTOF10: 2
PAINLEVEL_OUTOF10: 6
PAINLEVEL_OUTOF10: 1
PAINLEVEL_OUTOF10: 2
PAINLEVEL_OUTOF10: 6
PAINLEVEL_OUTOF10: 4
PAINLEVEL_OUTOF10: 7

## 2018-05-14 NOTE — PROGRESS NOTES
Pt calls for assistance up to bedside commode. No c/o of dizziness. c/o of headache, pain medications given per MAR. Pt A&Ox4, vital signs stable. Hourly rounding in place. Bed lowered, locked and call light within reach.

## 2018-05-14 NOTE — CARE PLAN
Problem: Mobility  Goal: Risk for activity intolerance will decrease  Pt calls appropriately for assistance.

## 2018-05-14 NOTE — DISCHARGE PLANNING
NAZANIN received call back from Sue. Referral/gentry is still being reviewed. Sue to call NAZANIN when she has any updates. Per Sue, may have an answer in 1-2 days.

## 2018-05-14 NOTE — PROGRESS NOTES
Assumed care of pt. Able to make needs known. A/Ox4. Complains of back pain, medicated per MAR. PIV in right hand 20g, saline locked and flushing well. Dysphagia 3 with thins. 1 assist to bathroom. BM today. POC SNF v. Home. Bed alarm on. Shower with OT today. Call light in reach, bed in low position, will continue hourly rounding.

## 2018-05-14 NOTE — DISCHARGE PLANNING
SW received call from pt's daughter who notified SW that she received call from [facility]. [facility] unable to verify pt's insurance. Daughter sent copy to NAZANIN who them faxed to [facility].     SW to follow up on [facility] referral tomorrow.

## 2018-05-14 NOTE — CARE PLAN
Problem: Pain Management  Goal: Pain level will decrease to patient's comfort goal  Pt resting following prn pain medication.

## 2018-05-14 NOTE — DISCHARGE PLANNING
SW left VM with pt's Sue LACKEY with [facility] to see if there are any updates on her SNF referral/gentry.     SW left direct contact number and requested call back at earliest convenience.

## 2018-05-14 NOTE — CARE PLAN
Problem: Safety  Goal: Will remain free from injury    Intervention: Provide assistance with mobility  1-2 assist, steady gait with some dizziness      Problem: Venous Thromboembolism (VTW)/Deep Vein Thrombosis (DVT) Prevention:  Goal: Patient will participate in Venous Thrombosis (VTE)/Deep Vein Thrombosis (DVT)Prevention Measures    Intervention: Assess and monitor for anticoagulation complications  Some bruising at site of lovenox injections

## 2018-05-14 NOTE — THERAPY
"Occupational Therapy Treatment completed with focus on ADLs, ADL transfers and patient education.  Functional Status:  Pt seen for OT tx today.  Pt was pleasant and cooperative during the session.  Continues to be limited by endurance, low BP, and higher level ADLs.  Pt completed toileting with supervision, LB dressing with supervision, UB dressing I, seated shower with supervision, and standing gr/hy with SBA.  Needing no cues to initiate, follow through, and problem solve during ADL tasks.  Pt completed supine to sit, sit to stand, room ambulation using no device from bed to toilet with SBA.  Pt would benefit from continued home health OT as they are continuing to need assistance with endurance and IADLs.     Plan of Care: Will benefit from Occupational Therapy 3 times per week  Discharge Recommendations:  Equipment Will Continue to Assess for Equipment Needs. Post-acute therapy Discharge to home with outpatient or home health for additional skilled therapy services.    See \"Rehab Therapy-Acute\" Patient Summary Report for complete documentation.   "

## 2018-05-14 NOTE — PROGRESS NOTES
[facility] Hospitalist Progress Note    Date of Service: 2018    Chief Complaint  53 y.o. female admitted 4/10/2018 with altered mental status who was found to have evidence of multiple acute strokes and new onset seizure disorder    Interval Problem Update  Intermittent hypotension last evening, no associated symptoms  Denies pain todaysum  No dizziness  ROS otherwise negative      Consultants/Specialty  Neurology    Disposition  Difficult placement in [place] - social work coordinating with family      Review of Systems   Constitutional: Negative.    HENT: Negative.    Eyes: Negative.    Respiratory: Negative.  Negative for shortness of breath.    Cardiovascular: Negative.  Negative for chest pain.   Gastrointestinal: Negative.    Genitourinary: Negative.    Musculoskeletal: Negative.    Skin: Negative.    Neurological: Negative.  Negative for dizziness, tingling, tremors, focal weakness, seizures and headaches.   Endo/Heme/Allergies: Negative.       Physical Exam  Laboratory/Imaging   Hemodynamics  Temp (24hrs), Av.7 °C (98 °F), Min:36.5 °C (97.7 °F), Max:36.9 °C (98.5 °F)   Temperature: 36.7 °C (98.1 °F)  Pulse  Av.4  Min: 12  Max: 157    Blood Pressure: 110/43     Respiratory      Respiration: 17, Pulse Oximetry: 92 %     Work Of Breathing / Effort: Mild  RUL Breath Sounds: Clear, RML Breath Sounds: Clear, RLL Breath Sounds: Clear, JOLYNN Breath Sounds: Clear, LLL Breath Sounds: Clear    Fluids  No intake or output data in the 24 hours ending 18 1044    Nutrition  Orders Placed This Encounter   Procedures   • DIET ORDER     Standing Status:   Standing     Number of Occurrences:   1     Order Specific Question:   Diet:     Answer:   Regular [1]     Order Specific Question:   Texture/Fiber modifications:     Answer:   Dysphagia 3(Mechanical Soft)specify fluid consistency(question 6) [3]     Order Specific Question:   Consistency/Fluid modifications:     Answer:   Thin Liquids [3]     Physical Exam    Constitutional: She is oriented to person, place, and time. She appears well-developed and well-nourished.   HENT:   Head: Normocephalic and atraumatic.   Eyes: Conjunctivae are normal. Right eye exhibits no discharge. Left eye exhibits no discharge. No scleral icterus.   Neck: Normal range of motion. No JVD present.   Cardiovascular: Normal rate and regular rhythm.  Exam reveals no gallop and no friction rub.    No murmur heard.  Pulmonary/Chest: Effort normal and breath sounds normal. No stridor. No respiratory distress. She has no wheezes. She has no rales. She exhibits no tenderness.   Abdominal: Soft. Bowel sounds are normal. She exhibits no distension and no mass. There is no tenderness. There is no rebound and no guarding.   Musculoskeletal: Normal range of motion. She exhibits no edema or deformity.   Lymphadenopathy:     She has no cervical adenopathy.   Neurological: She is alert and oriented to person, place, and time. She has normal reflexes. No cranial nerve deficit. Coordination normal.   Skin: Skin is warm and dry. No rash noted. No erythema. No pallor.   Psychiatric: She has a normal mood and affect. Her behavior is normal. Judgment and thought content normal.                                Assessment/Plan     * Acute CVA (cerebrovascular accident) (HCC)   Assessment & Plan    Multifocal ischemic infarcts  Neurology has signed off  Carotid US normal.  Continue aspirin  LACI w bubble study showed PFO.  Patient wishing to treat medically at this time.  Anticipate loop recorder placement prior to d/c to r/o embolic origin.  She passed her swallow eval and has been tolerating a diet.  SNF placement pending                Acute respiratory failure with hypoxia (HCC)- (present on admission)   Assessment & Plan    Likely secondary to seizure.  Required intubation on 4/26 and extubation on 4/27.  Pulmonology has signed off  Resolved        Hypotension- (present on admission)   Assessment & Plan    Has had  chronic intermittent episodes of unresponsiveness while ambulating secondary to orthostatic hypotension.   None recently  Continue midodrine  Tolerating slow taper of hydrocortisone.            Seizure disorder as sequela of cerebrovascular accident (HCC)- (present on admission)   Assessment & Plan    Neurology has signed off  No further seizure activity  Continue keppra.            Drug overdose - unintentional- (present on admission)   Assessment & Plan    Buprenorphine patch removed PTA.   Patient was on high doses of narcotics at home for chronic pain and fibromyalgia  Continue conservative use of narcotics, tapering down as tolerated                 Weakness   Assessment & Plan    Secondary to above.  PT/OT following.   Pending SNF placement.        Normocytic anemia- (present on admission)   Assessment & Plan    Stable.        Chronic pain syndrome- (present on admission)   Assessment & Plan    Pain controlled.  Tapering down as tolerated  Possible unintentional narcotics overdose suspected on admit          Campylobacter diarrhea- (present on admission)   Assessment & Plan    Campylobacter on cultures from transferring facility. C-diff negative here on 4/10/18.  Resolved.            Hyperglycemia   Assessment & Plan    A1C normal at 5.4. No hx of Dm.  This likely steroid induced.   Following            Leukocytosis- (present on admission)   Assessment & Plan    Improved  Completed Unasyn and Azithro 4/16.  Procalcitonin was normal.  Clinically stable  Likely secondary to steroids.          Elevated brain natriuretic peptide (BNP) level   Assessment & Plan    BNP 8000s on arrival.   TTE showed normal diastolic function & EF 70%.  Remains euvolemic on exam            Elevated troponin- (present on admission)   Assessment & Plan    No CP or hx of CAD.  Normal echocardiogram.  Repeat trended down.   No need for further studies at this time, consider stress test as outpatient when other issues stable         Autonomic neuropathy- (present on admission)   Assessment & Plan    On midodrine and hydrocortisone  Following          Anorexia- (present on admission)   Assessment & Plan    Continue to encourage oral intake.  Appropriate caloric intake discussed with patient and family.  She has required a PEG in the past.  Dietician following.        Aspiration pneumonia (HCC)- (present on admission)   Assessment & Plan    Completed a course of Unasyn  Normal CT of chest  Normal exam                Quality-Core Measures

## 2018-05-15 PROCEDURE — 700102 HCHG RX REV CODE 250 W/ 637 OVERRIDE(OP): Performed by: HOSPITALIST

## 2018-05-15 PROCEDURE — A9270 NON-COVERED ITEM OR SERVICE: HCPCS | Performed by: HOSPITALIST

## 2018-05-15 PROCEDURE — 700111 HCHG RX REV CODE 636 W/ 250 OVERRIDE (IP): Performed by: HOSPITALIST

## 2018-05-15 PROCEDURE — 770006 HCHG ROOM/CARE - MED/SURG/GYN SEMI*

## 2018-05-15 PROCEDURE — 99232 SBSQ HOSP IP/OBS MODERATE 35: CPT | Performed by: HOSPITALIST

## 2018-05-15 RX ORDER — HYDROCORTISONE 10 MG/1
10 TABLET ORAL 3 TIMES DAILY
Status: DISCONTINUED | OUTPATIENT
Start: 2018-05-15 | End: 2018-05-18 | Stop reason: HOSPADM

## 2018-05-15 RX ADMIN — LEVETIRACETAM 500 MG: 500 TABLET, FILM COATED ORAL at 20:04

## 2018-05-15 RX ADMIN — MIDODRINE HYDROCHLORIDE 10 MG: 5 TABLET ORAL at 20:04

## 2018-05-15 RX ADMIN — MIDODRINE HYDROCHLORIDE 10 MG: 5 TABLET ORAL at 12:22

## 2018-05-15 RX ADMIN — PREGABALIN 150 MG: 75 CAPSULE ORAL at 07:50

## 2018-05-15 RX ADMIN — MIDODRINE HYDROCHLORIDE 10 MG: 5 TABLET ORAL at 07:50

## 2018-05-15 RX ADMIN — LEVETIRACETAM 500 MG: 500 TABLET, FILM COATED ORAL at 07:50

## 2018-05-15 RX ADMIN — HYDROCORTISONE 10 MG: 10 TABLET ORAL at 20:03

## 2018-05-15 RX ADMIN — ASPIRIN 81 MG: 81 TABLET, CHEWABLE ORAL at 07:50

## 2018-05-15 RX ADMIN — OXYCODONE HYDROCHLORIDE 10 MG: 10 TABLET ORAL at 21:06

## 2018-05-15 RX ADMIN — ENOXAPARIN SODIUM 40 MG: 100 INJECTION SUBCUTANEOUS at 07:51

## 2018-05-15 RX ADMIN — HYDROCORTISONE 10 MG: 10 TABLET ORAL at 16:26

## 2018-05-15 RX ADMIN — MIDODRINE HYDROCHLORIDE 10 MG: 5 TABLET ORAL at 16:24

## 2018-05-15 RX ADMIN — HYDROCORTISONE 20 MG: 20 TABLET ORAL at 07:51

## 2018-05-15 RX ADMIN — OXYCODONE HYDROCHLORIDE 10 MG: 10 TABLET ORAL at 07:59

## 2018-05-15 RX ADMIN — PREGABALIN 150 MG: 75 CAPSULE ORAL at 20:03

## 2018-05-15 RX ADMIN — OXYCODONE HYDROCHLORIDE 10 MG: 10 TABLET ORAL at 16:25

## 2018-05-15 ASSESSMENT — PAIN SCALES - GENERAL
PAINLEVEL_OUTOF10: 8
PAINLEVEL_OUTOF10: 6

## 2018-05-15 ASSESSMENT — ENCOUNTER SYMPTOMS
TREMORS: 0
SHORTNESS OF BREATH: 0
RESPIRATORY NEGATIVE: 1
SEIZURES: 0
NEUROLOGICAL NEGATIVE: 1
FOCAL WEAKNESS: 0
CARDIOVASCULAR NEGATIVE: 1
DIZZINESS: 0
GASTROINTESTINAL NEGATIVE: 1
HEADACHES: 0
CONSTITUTIONAL NEGATIVE: 1
EYES NEGATIVE: 1
MUSCULOSKELETAL NEGATIVE: 1
TINGLING: 0

## 2018-05-15 NOTE — PROGRESS NOTES
Pt resting at this time. Pain meds administered. On room air, sats within normal limits. Up to bathroom with standby assist. Tolerated well. Call light and personal items within reach. Will monitor.

## 2018-05-15 NOTE — DISCHARGE PLANNING
SW received call from pt's insurance: [facility] [employer]. Pt's coverage ended on April 30th. Mabel Razo in [facility] notified in [employer] call and will look into pt regaining coverage.     As pt's coverage is pending, gentry/ referral with [facility] will be postponed.

## 2018-05-15 NOTE — CARE PLAN
Problem: Fluid Volume:  Goal: Will maintain balanced intake and output    Intervention: Monitor, educate, and encourage compliance with therapeutic intake of liquids  Fresh ice water to bedside. Patient encouraged to drink.      Problem: Mobility  Goal: Risk for activity intolerance will decrease    Intervention: Assess and monitor signs of activity intolerance  Close standby assist provided for all ambulation to intervene in case patient begins to fall.

## 2018-05-15 NOTE — PROGRESS NOTES
[facility]    Date of Service: 5/15/2018    Chief Complaint  53 y.o. female admitted 4/10/2018 with altered mental status who was found to have evidence of multiple acute strokes and new onset seizure disorder    Interval Problem Update  Pt continues to experience intermittent hypotension, no associated symptoms but remains  Unsteady on ambulation  Denies pain, no dizziness  Frustrated about her discharge plan      Consultants/Specialty  Neurology    Disposition  Difficult placement in [place] - social work coordinating with family      Review of Systems   Constitutional: Negative.    HENT: Negative.    Eyes: Negative.    Respiratory: Negative.  Negative for shortness of breath.    Cardiovascular: Negative.  Negative for chest pain.   Gastrointestinal: Negative.    Genitourinary: Negative.    Musculoskeletal: Negative.    Skin: Negative.    Neurological: Negative.  Negative for dizziness, tingling, tremors, focal weakness, seizures and headaches.   Endo/Heme/Allergies: Negative.       Physical Exam  Laboratory/Imaging   Hemodynamics  Temp (24hrs), Av.6 °C (97.9 °F), Min:36.1 °C (96.9 °F), Max:36.9 °C (98.5 °F)   Temperature: 36.1 °C (96.9 °F)  Pulse  Av.3  Min: 12  Max: 157    Blood Pressure: 153/78     Respiratory      Respiration: 17, Pulse Oximetry: 98 %        RUL Breath Sounds: Clear, RML Breath Sounds: Clear, RLL Breath Sounds: Clear, JOLYNN Breath Sounds: Clear, LLL Breath Sounds: Clear    Fluids    Intake/Output Summary (Last 24 hours) at 05/15/18 1000  Last data filed at 18   Gross per 24 hour   Intake              100 ml   Output                0 ml   Net              100 ml       Nutrition  Orders Placed This Encounter   Procedures   • DIET ORDER     Standing Status:   Standing     Number of Occurrences:   1     Order Specific Question:   Diet:     Answer:   Regular [1]     Order Specific Question:   Texture/Fiber modifications:     Answer:   Dysphagia 3(Mechanical  Soft)specify fluid consistency(question 6) [3]     Order Specific Question:   Consistency/Fluid modifications:     Answer:   Thin Liquids [3]     Physical Exam   Constitutional: She is oriented to person, place, and time. She appears well-developed and well-nourished.   HENT:   Head: Normocephalic and atraumatic.   Eyes: Conjunctivae are normal. Right eye exhibits no discharge. Left eye exhibits no discharge. No scleral icterus.   Neck: Normal range of motion. No JVD present.   Cardiovascular: Normal rate and regular rhythm.  Exam reveals no gallop and no friction rub.    No murmur heard.  Pulmonary/Chest: Effort normal and breath sounds normal. No stridor. No respiratory distress. She has no wheezes. She has no rales. She exhibits no tenderness.   Abdominal: Soft. Bowel sounds are normal. She exhibits no distension and no mass. There is no tenderness. There is no rebound and no guarding.   Musculoskeletal: Normal range of motion. She exhibits no edema or deformity.   Lymphadenopathy:     She has no cervical adenopathy.   Neurological: She is alert and oriented to person, place, and time. She has normal reflexes. No cranial nerve deficit. Coordination normal.   Skin: Skin is warm and dry. No rash noted. No erythema. No pallor.   Psychiatric: She has a normal mood and affect. Her behavior is normal. Judgment and thought content normal.                                Assessment/Plan     * Acute CVA (cerebrovascular accident) (HCC)   Assessment & Plan    Multifocal ischemic infarcts  Neurology has signed off  Carotid US normal.  Continue aspirin  LACI w bubble study showed PFO.  Patient wishing to treat medically at this time.  Hopefully d/c soon, will consult cardiology regarding loop recorder placement prior to d/c to r/o embolic origin.  SNF placement pending                Acute respiratory failure with hypoxia (HCC)- (present on admission)   Assessment & Plan    Likely secondary to seizure.  Required intubation on  4/26 and extubation on 4/27.  Pulmonology signed off  Resolved        Hypotension- (present on admission)   Assessment & Plan    Has had chronic intermittent episodes of unresponsiveness while ambulating secondary to orthostatic hypotension.   None recently  Continue midodrine  Slow taper of hydrocortisone, bp intermittently low            Seizure disorder as sequela of cerebrovascular accident (HCC)- (present on admission)   Assessment & Plan    Neurology has signed off  No further seizure activity  Continue keppra.            Drug overdose - unintentional- (present on admission)   Assessment & Plan    Buprenorphine patch removed PTA.   Patient was on high doses of narcotics at home for chronic pain and fibromyalgia  Continue conservative use of narcotics, tapering down as tolerated                 Weakness   Assessment & Plan    Secondary to above.  PT/OT following.   Pending SNF placement.        Normocytic anemia- (present on admission)   Assessment & Plan    Stable.        Chronic pain syndrome- (present on admission)   Assessment & Plan    Pain controlled.  Tapering down as tolerated  Possible unintentional narcotics overdose suspected on admit          Campylobacter diarrhea- (present on admission)   Assessment & Plan    Campylobacter on cultures from transferring facility. C-diff negative here on 4/10/18.  Resolved.            Hyperglycemia   Assessment & Plan    A1C normal at 5.4. No hx of Dm.  This likely steroid induced.   Following            Leukocytosis- (present on admission)   Assessment & Plan    Improved  Completed Unasyn and Azithro 4/16.  Procalcitonin was normal.  Clinically stable  Likely secondary to steroids.  Will repeat          Elevated brain natriuretic peptide (BNP) level   Assessment & Plan    BNP 8000s on arrival.   TTE showed normal diastolic function & EF 70%.  Remains euvolemic on exam            Elevated troponin- (present on admission)   Assessment & Plan    No CP or hx of  CAD.  Normal echocardiogram.  Repeat trended down.   No need for further studies at this time, consider stress test as outpatient when other issues stable        Autonomic neuropathy- (present on admission)   Assessment & Plan    On midodrine and hydrocortisone  Following          Anorexia- (present on admission)   Assessment & Plan    Continue to encourage oral intake.  Appropriate caloric intake discussed with patient and family.  She has required a PEG in the past.  Dietician following.        Aspiration pneumonia (HCC)- (present on admission)   Assessment & Plan    Completed a course of Unasyn  Normal CT of chest  Normal exam                Quality-Core Measures

## 2018-05-15 NOTE — CARE PLAN
Problem: Safety  Goal: Will remain free from injury  Outcome: PROGRESSING AS EXPECTED  Bed alarm on. Call light within reach.    Problem: Respiratory:  Goal: Respiratory status will improve  Outcome: PROGRESSING AS EXPECTED  On room air at this time. Sats within normal limits. Reminded deep breathing techniques.

## 2018-05-15 NOTE — PROGRESS NOTES
Patient walks well to bathroom. Close standby assist provided to intervene in case she begins to fall. Report given to BRAD Bradshaw to follow suit. Patient calls appropriately, but bed low, locked, side rails up x 3 and bed alarm armed.

## 2018-05-16 LAB
BASOPHILS # BLD AUTO: 0.3 % (ref 0–1.8)
BASOPHILS # BLD: 0.04 K/UL (ref 0–0.12)
EOSINOPHIL # BLD AUTO: 0.06 K/UL (ref 0–0.51)
EOSINOPHIL NFR BLD: 0.5 % (ref 0–6.9)
ERYTHROCYTE [DISTWIDTH] IN BLOOD BY AUTOMATED COUNT: 44.6 FL (ref 35.9–50)
HCT VFR BLD AUTO: 38.4 % (ref 37–47)
HGB BLD-MCNC: 12.7 G/DL (ref 12–16)
IMM GRANULOCYTES # BLD AUTO: 0.06 K/UL (ref 0–0.11)
IMM GRANULOCYTES NFR BLD AUTO: 0.5 % (ref 0–0.9)
LYMPHOCYTES # BLD AUTO: 3.28 K/UL (ref 1–4.8)
LYMPHOCYTES NFR BLD: 26.2 % (ref 22–41)
MCH RBC QN AUTO: 30.2 PG (ref 27–33)
MCHC RBC AUTO-ENTMCNC: 33.1 G/DL (ref 33.6–35)
MCV RBC AUTO: 91.4 FL (ref 81.4–97.8)
MONOCYTES # BLD AUTO: 0.66 K/UL (ref 0–0.85)
MONOCYTES NFR BLD AUTO: 5.3 % (ref 0–13.4)
NEUTROPHILS # BLD AUTO: 8.42 K/UL (ref 2–7.15)
NEUTROPHILS NFR BLD: 67.2 % (ref 44–72)
NRBC # BLD AUTO: 0 K/UL
NRBC BLD-RTO: 0 /100 WBC
PLATELET # BLD AUTO: 267 K/UL (ref 164–446)
PMV BLD AUTO: 10.8 FL (ref 9–12.9)
RBC # BLD AUTO: 4.2 M/UL (ref 4.2–5.4)
WBC # BLD AUTO: 12.5 K/UL (ref 4.8–10.8)

## 2018-05-16 PROCEDURE — 700111 HCHG RX REV CODE 636 W/ 250 OVERRIDE (IP): Performed by: HOSPITALIST

## 2018-05-16 PROCEDURE — A9270 NON-COVERED ITEM OR SERVICE: HCPCS | Performed by: HOSPITALIST

## 2018-05-16 PROCEDURE — 700102 HCHG RX REV CODE 250 W/ 637 OVERRIDE(OP): Performed by: HOSPITALIST

## 2018-05-16 PROCEDURE — 99232 SBSQ HOSP IP/OBS MODERATE 35: CPT | Performed by: HOSPITALIST

## 2018-05-16 PROCEDURE — 36415 COLL VENOUS BLD VENIPUNCTURE: CPT

## 2018-05-16 PROCEDURE — 92526 ORAL FUNCTION THERAPY: CPT

## 2018-05-16 PROCEDURE — 85025 COMPLETE CBC W/AUTO DIFF WBC: CPT

## 2018-05-16 PROCEDURE — 770006 HCHG ROOM/CARE - MED/SURG/GYN SEMI*

## 2018-05-16 RX ADMIN — ASPIRIN 81 MG: 81 TABLET, CHEWABLE ORAL at 08:28

## 2018-05-16 RX ADMIN — HYDROCORTISONE 10 MG: 10 TABLET ORAL at 08:27

## 2018-05-16 RX ADMIN — OXYCODONE HYDROCHLORIDE 10 MG: 10 TABLET ORAL at 20:35

## 2018-05-16 RX ADMIN — MIDODRINE HYDROCHLORIDE 10 MG: 5 TABLET ORAL at 17:55

## 2018-05-16 RX ADMIN — HYDROCORTISONE 10 MG: 10 TABLET ORAL at 20:37

## 2018-05-16 RX ADMIN — MIDODRINE HYDROCHLORIDE 10 MG: 5 TABLET ORAL at 08:27

## 2018-05-16 RX ADMIN — MIDODRINE HYDROCHLORIDE 10 MG: 5 TABLET ORAL at 13:19

## 2018-05-16 RX ADMIN — MIDODRINE HYDROCHLORIDE 10 MG: 5 TABLET ORAL at 20:35

## 2018-05-16 RX ADMIN — PREGABALIN 150 MG: 75 CAPSULE ORAL at 20:35

## 2018-05-16 RX ADMIN — OXYCODONE HYDROCHLORIDE 10 MG: 10 TABLET ORAL at 08:35

## 2018-05-16 RX ADMIN — HYDROCORTISONE 10 MG: 10 TABLET ORAL at 14:39

## 2018-05-16 RX ADMIN — ENOXAPARIN SODIUM 40 MG: 100 INJECTION SUBCUTANEOUS at 08:28

## 2018-05-16 RX ADMIN — LEVETIRACETAM 500 MG: 500 TABLET, FILM COATED ORAL at 20:37

## 2018-05-16 RX ADMIN — OXYCODONE HYDROCHLORIDE 5 MG: 5 TABLET ORAL at 13:19

## 2018-05-16 RX ADMIN — PREGABALIN 150 MG: 75 CAPSULE ORAL at 08:27

## 2018-05-16 RX ADMIN — OXYCODONE HYDROCHLORIDE 10 MG: 10 TABLET ORAL at 01:28

## 2018-05-16 RX ADMIN — LEVETIRACETAM 500 MG: 500 TABLET, FILM COATED ORAL at 08:28

## 2018-05-16 ASSESSMENT — ENCOUNTER SYMPTOMS
MUSCULOSKELETAL NEGATIVE: 1
DIZZINESS: 0
GASTROINTESTINAL NEGATIVE: 1
SEIZURES: 0
CONSTITUTIONAL NEGATIVE: 1
CARDIOVASCULAR NEGATIVE: 1
TREMORS: 0
NEUROLOGICAL NEGATIVE: 1
TINGLING: 0
FOCAL WEAKNESS: 0
SHORTNESS OF BREATH: 0
RESPIRATORY NEGATIVE: 1
HEADACHES: 0
EYES NEGATIVE: 1

## 2018-05-16 ASSESSMENT — PAIN SCALES - GENERAL
PAINLEVEL_OUTOF10: 0
PAINLEVEL_OUTOF10: 7
PAINLEVEL_OUTOF10: 0
PAINLEVEL_OUTOF10: 6
PAINLEVEL_OUTOF10: 0
PAINLEVEL_OUTOF10: 0
PAINLEVEL_OUTOF10: 6

## 2018-05-16 NOTE — DISCHARGE PLANNING
SW received call from Jackie Soni ([phone]), pt's cousin requesting call back to discuss d/c home.     SW spoke with pt to see if she is aware that Jackie is contacting SW to discuss d/c and to received premission to talk with her. Pt documented as a/ox4 but was confused as to who Jackie might be. NAZANIN called pt's daughter who verified that Jackie is family. NAZANIN spoke with pt later and was able to also verify who Jackie is and provided permission to discuss d/c plan.     Pt's daughter would like to pt to d/c home to [place] this Saturday and will have Jackie  pt there. Daughter to take off work to care for to this weekend.     Plan discussed in rounds. MD agreeable.     Plan: SW to call Jackie to discuss d/c plan.

## 2018-05-16 NOTE — PROGRESS NOTES
Patient is resting in bed. Patient is A&Ox4. Bed alarm on, call light within reach, bed alarm on. Dysphagia 3 thin liquid diet. Hourly rounding in place.

## 2018-05-16 NOTE — PROGRESS NOTES
Some expressive aphasia noted. Pt unable to convey the correct current year when assessing mentation. Able to make simplex needs known. Pt very eager to go home. A friend called in today to check on the patient's plan of care. Friend was given social works number to inquire further. No SOB or resp distress. C/O bilateral shoulder pain controlled adequately with PRN Oxycodone. Meds administered whole with ease. Fluids encouraged. W/C utilized for mobility. Pt currently resting in bed. Will continue to monitor.

## 2018-05-16 NOTE — CARE PLAN
"Problem: Bowel/Gastric:  Goal: Will not experience complications related to bowel motility  Outcome: PROGRESSING AS EXPECTED  Pt has not experience any problems in relation to bowel motility. Bowel sounds normoactive x4 quads. Skin kept clean warm and dry. Continent of B/B. Refused stool softeners this morning. Reports having a bowel movement \"everyday\", last one being yesterday 5/15. Fluids encouraged. Will continue to monitor.      "

## 2018-05-16 NOTE — CARE PLAN
Problem: Safety  Goal: Will remain free from injury  Outcome: PROGRESSING AS EXPECTED      Problem: Pain Management  Goal: Pain level will decrease to patient's comfort goal  Pain assessed, medication given per MAR

## 2018-05-16 NOTE — PROGRESS NOTES
[facility] Progress Note    Date of Service: 2018    Chief Complaint  53 y.o. female admitted 4/10/2018 with altered mental status who was found to have evidence of multiple acute strokes and new onset seizure disorder    Interval Problem Update  Intermittent mild hypotension with no associated symptoms, tolerating steroid taper  Axox4  Very motivated to be discharged, working with her family to arrange home care      Consultants/Specialty  Neurology    Disposition  Difficult placement in [place] - social work coordinating with family      Review of Systems   Constitutional: Negative.    HENT: Negative.    Eyes: Negative.    Respiratory: Negative.  Negative for shortness of breath.    Cardiovascular: Negative.  Negative for chest pain.   Gastrointestinal: Negative.    Genitourinary: Negative.    Musculoskeletal: Negative.    Skin: Negative.    Neurological: Negative.  Negative for dizziness, tingling, tremors, focal weakness, seizures and headaches.   Endo/Heme/Allergies: Negative.       Physical Exam  Laboratory/Imaging   Hemodynamics  Temp (24hrs), Av.9 °C (98.5 °F), Min:36.4 °C (97.5 °F), Max:37.6 °C (99.6 °F)   Temperature: 36.6 °C (97.9 °F)  Pulse  Av  Min: 12  Max: 157    Blood Pressure: (!) 89/59 (RN Notified.)     Respiratory      Respiration: 18, Pulse Oximetry: 91 %        RUL Breath Sounds: Clear, RML Breath Sounds: Clear, RLL Breath Sounds: Clear, JOLYNN Breath Sounds: Clear, LLL Breath Sounds: Clear    Fluids    Intake/Output Summary (Last 24 hours) at 18 1012  Last data filed at 05/15/18 1800   Gross per 24 hour   Intake              240 ml   Output                0 ml   Net              240 ml       Nutrition  Orders Placed This Encounter   Procedures   • DIET ORDER     Standing Status:   Standing     Number of Occurrences:   1     Order Specific Question:   Diet:     Answer:   Regular [1]     Order Specific Question:   Texture/Fiber modifications:     Answer:   Dysphagia  3(Mechanical Soft)specify fluid consistency(question 6) [3]     Order Specific Question:   Consistency/Fluid modifications:     Answer:   Thin Liquids [3]     Physical Exam   Constitutional: She is oriented to person, place, and time. She appears well-developed and well-nourished.   HENT:   Head: Normocephalic and atraumatic.   Eyes: Conjunctivae are normal. Right eye exhibits no discharge. Left eye exhibits no discharge. No scleral icterus.   Neck: Normal range of motion. No JVD present.   Cardiovascular: Normal rate and regular rhythm.  Exam reveals no gallop and no friction rub.    No murmur heard.  Pulmonary/Chest: Effort normal and breath sounds normal. No stridor. No respiratory distress. She has no wheezes. She has no rales. She exhibits no tenderness.   Abdominal: Soft. Bowel sounds are normal. She exhibits no distension and no mass. There is no tenderness. There is no rebound and no guarding.   Musculoskeletal: Normal range of motion. She exhibits no edema or deformity.   Lymphadenopathy:     She has no cervical adenopathy.   Neurological: She is alert and oriented to person, place, and time. She has normal reflexes. No cranial nerve deficit. Coordination normal.   Skin: Skin is warm and dry. No rash noted. No erythema. No pallor.   Psychiatric: She has a normal mood and affect. Her behavior is normal. Judgment and thought content normal.       Recent Labs      05/16/18   0255   WBC  12.5*   RBC  4.20   HEMOGLOBIN  12.7   HEMATOCRIT  38.4   MCV  91.4   MCH  30.2   MCHC  33.1*   RDW  44.6   PLATELETCT  267   MPV  10.8                          Assessment/Plan     * Acute CVA (cerebrovascular accident) ([facility])   Assessment & Plan    Multifocal ischemic infarcts  Neurology has signed off  Carotid US normal.  Continue aspirin  LACI w bubble study showed PFO.  Patient wishing to treat medically at this time.  Seen by cardiology, they do not feel she is a candidate for loop at this time as she is returning to  [place] at discharge, they recommend this be done in [place] so it can be followed up there                Acute respiratory failure with hypoxia (HCC)- (present on admission)   Assessment & Plan    Likely secondary to seizure.  Required intubation on 4/26 and extubation on 4/27.  Pulmonology signed off  Resolved        Hypotension- (present on admission)   Assessment & Plan    Has had chronic intermittent episodes of unresponsiveness while ambulating secondary to orthostatic hypotension.   None recently  Continue midodrine  Slow taper of hydrocortisone, bp intermittently low            Seizure disorder as sequela of cerebrovascular accident (HCC)- (present on admission)   Assessment & Plan    Neurology has signed off  No further seizure activity  Continue keppra.            Drug overdose - unintentional- (present on admission)   Assessment & Plan    Buprenorphine patch removed PTA.   Patient was on high doses of narcotics at home for chronic pain and fibromyalgia  Continue conservative use of narcotics, tapering down as tolerated                 Weakness   Assessment & Plan    Secondary to above.  PT/OT following.   Pending SNF placement.        Normocytic anemia- (present on admission)   Assessment & Plan    Stable.        Chronic pain syndrome- (present on admission)   Assessment & Plan    Pain controlled.  Tapering down as tolerated  Possible unintentional narcotics overdose suspected on admit          Campylobacter diarrhea- (present on admission)   Assessment & Plan    Campylobacter on cultures from transferring facility. C-diff negative here on 4/10/18.  Resolved.            Hyperglycemia   Assessment & Plan    A1C normal at 5.4. No hx of Dm.  This likely steroid induced.   Following            Leukocytosis- (present on admission)   Assessment & Plan    Improved  Completed Unasyn and Azithro 4/16.  Procalcitonin was normal.  Clinically stable  Likely secondary to steroids.            Elevated brain  natriuretic peptide (BNP) level   Assessment & Plan    BNP 8000s on arrival.   TTE showed normal diastolic function & EF 70%.  Remains euvolemic on exam            Elevated troponin- (present on admission)   Assessment & Plan    No CP or hx of CAD.  Normal echocardiogram.  Repeat trended down.   No need for further studies at this time, consider stress test as outpatient when other issues stable        Autonomic neuropathy- (present on admission)   Assessment & Plan    On midodrine and hydrocortisone  Following          Anorexia- (present on admission)   Assessment & Plan    Continue to encourage oral intake.  Appropriate caloric intake discussed with patient and family.  She has required a PEG in the past.  Dietician following.        Aspiration pneumonia (HCC)- (present on admission)   Assessment & Plan    Completed a course of Unasyn  Normal CT of chest  Normal exam                Quality-Core Measures

## 2018-05-16 NOTE — THERAPY
"Speech Language Therapy dysphagia treatment completed.   Functional Status:  AOx4, cooperative. Tolerating dysphagia 3/thins diet with out difficulty.   Recommendations: Recommend regular diet. Order changed in epic to correctly reflect regular diet. Will f/u for tolerance check.   Plan of Care: Will benefit from Speech Therapy 3 times per week  Post-Acute Therapy: Discharge to home with outpatient or home health for additional skilled therapy services.    See \"Rehab Therapy-Acute\" Patient Summary Report for complete documentation.     "

## 2018-05-17 LAB
BASOPHILS # BLD AUTO: 0.6 % (ref 0–1.8)
BASOPHILS # BLD: 0.09 K/UL (ref 0–0.12)
EOSINOPHIL # BLD AUTO: 0.12 K/UL (ref 0–0.51)
EOSINOPHIL NFR BLD: 0.8 % (ref 0–6.9)
ERYTHROCYTE [DISTWIDTH] IN BLOOD BY AUTOMATED COUNT: 45.9 FL (ref 35.9–50)
HCT VFR BLD AUTO: 40.6 % (ref 37–47)
HGB BLD-MCNC: 13.5 G/DL (ref 12–16)
IMM GRANULOCYTES # BLD AUTO: 0.11 K/UL (ref 0–0.11)
IMM GRANULOCYTES NFR BLD AUTO: 0.7 % (ref 0–0.9)
LYMPHOCYTES # BLD AUTO: 3.73 K/UL (ref 1–4.8)
LYMPHOCYTES NFR BLD: 24.7 % (ref 22–41)
MCH RBC QN AUTO: 30.4 PG (ref 27–33)
MCHC RBC AUTO-ENTMCNC: 33.3 G/DL (ref 33.6–35)
MCV RBC AUTO: 91.4 FL (ref 81.4–97.8)
MONOCYTES # BLD AUTO: 0.96 K/UL (ref 0–0.85)
MONOCYTES NFR BLD AUTO: 6.4 % (ref 0–13.4)
NEUTROPHILS # BLD AUTO: 10.07 K/UL (ref 2–7.15)
NEUTROPHILS NFR BLD: 66.8 % (ref 44–72)
NRBC # BLD AUTO: 0 K/UL
NRBC BLD-RTO: 0 /100 WBC
PLATELET # BLD AUTO: 288 K/UL (ref 164–446)
PMV BLD AUTO: 10.3 FL (ref 9–12.9)
RBC # BLD AUTO: 4.44 M/UL (ref 4.2–5.4)
WBC # BLD AUTO: 15.1 K/UL (ref 4.8–10.8)

## 2018-05-17 PROCEDURE — A9270 NON-COVERED ITEM OR SERVICE: HCPCS | Performed by: HOSPITALIST

## 2018-05-17 PROCEDURE — 770006 HCHG ROOM/CARE - MED/SURG/GYN SEMI*

## 2018-05-17 PROCEDURE — 700102 HCHG RX REV CODE 250 W/ 637 OVERRIDE(OP): Performed by: HOSPITALIST

## 2018-05-17 PROCEDURE — 700111 HCHG RX REV CODE 636 W/ 250 OVERRIDE (IP): Performed by: HOSPITALIST

## 2018-05-17 PROCEDURE — 85025 COMPLETE CBC W/AUTO DIFF WBC: CPT

## 2018-05-17 PROCEDURE — 36415 COLL VENOUS BLD VENIPUNCTURE: CPT

## 2018-05-17 PROCEDURE — 99232 SBSQ HOSP IP/OBS MODERATE 35: CPT | Performed by: HOSPITALIST

## 2018-05-17 RX ADMIN — ENOXAPARIN SODIUM 40 MG: 100 INJECTION SUBCUTANEOUS at 08:49

## 2018-05-17 RX ADMIN — MIDODRINE HYDROCHLORIDE 10 MG: 5 TABLET ORAL at 16:25

## 2018-05-17 RX ADMIN — HYDROCORTISONE 10 MG: 10 TABLET ORAL at 19:32

## 2018-05-17 RX ADMIN — OXYCODONE HYDROCHLORIDE 5 MG: 5 TABLET ORAL at 19:32

## 2018-05-17 RX ADMIN — MIDODRINE HYDROCHLORIDE 10 MG: 5 TABLET ORAL at 19:32

## 2018-05-17 RX ADMIN — MIDODRINE HYDROCHLORIDE 10 MG: 5 TABLET ORAL at 08:48

## 2018-05-17 RX ADMIN — HYDROCORTISONE 10 MG: 10 TABLET ORAL at 08:53

## 2018-05-17 RX ADMIN — LEVETIRACETAM 500 MG: 500 TABLET, FILM COATED ORAL at 19:32

## 2018-05-17 RX ADMIN — PREGABALIN 150 MG: 75 CAPSULE ORAL at 08:49

## 2018-05-17 RX ADMIN — ACETAMINOPHEN 650 MG: 325 TABLET, FILM COATED ORAL at 13:10

## 2018-05-17 RX ADMIN — MIDODRINE HYDROCHLORIDE 10 MG: 5 TABLET ORAL at 13:10

## 2018-05-17 RX ADMIN — OXYCODONE HYDROCHLORIDE 5 MG: 5 TABLET ORAL at 13:10

## 2018-05-17 RX ADMIN — ASPIRIN 81 MG: 81 TABLET, CHEWABLE ORAL at 08:49

## 2018-05-17 RX ADMIN — PREGABALIN 150 MG: 75 CAPSULE ORAL at 19:32

## 2018-05-17 RX ADMIN — OXYCODONE HYDROCHLORIDE 5 MG: 5 TABLET ORAL at 08:49

## 2018-05-17 RX ADMIN — LEVETIRACETAM 500 MG: 500 TABLET, FILM COATED ORAL at 08:49

## 2018-05-17 RX ADMIN — HYDROCORTISONE 10 MG: 10 TABLET ORAL at 16:25

## 2018-05-17 ASSESSMENT — ENCOUNTER SYMPTOMS
GASTROINTESTINAL NEGATIVE: 1
SHORTNESS OF BREATH: 0
RESPIRATORY NEGATIVE: 1
NEUROLOGICAL NEGATIVE: 1
TINGLING: 0
CONSTITUTIONAL NEGATIVE: 1
TREMORS: 0
EYES NEGATIVE: 1
CARDIOVASCULAR NEGATIVE: 1
MUSCULOSKELETAL NEGATIVE: 1
SEIZURES: 0
DIZZINESS: 0
FOCAL WEAKNESS: 0
HEADACHES: 0

## 2018-05-17 ASSESSMENT — PAIN SCALES - GENERAL
PAINLEVEL_OUTOF10: 7
PAINLEVEL_OUTOF10: 8

## 2018-05-17 NOTE — DISCHARGE PLANNING
Medical Social Work     NAZANIN spoke with the pt daughter Ling [phone] to verify the d/c plan. The pt daughter Ling advised NAZANIN that the d/c plan has changed. Ling stated that she spoke with spoke with Jackie and that Jackie never intended to take the pt after the pt discharged from [facility].     Ling stated that she plans on taking the pt home tomorrow, 5/17/18. Ling stated she does have the family support and resources at this time to take the pt home and care for her. Ling stated she is currently researching for in home care takers for the pt but does not have one set up at this time. Ling stated that she has arranged with her family in the area to help assist with taking care of the pt.     Ling advised NAZANIN that the pt S/O will be picking up the pt at the time of discharge. The pt S/O is Angel Chand [phone].     NAZANIN called Jackie and verified with her that she is not going to be able to take care of the pt when she is discharged. Jackie stated there was miss communication and she did not intend to take care of the pt when she discharged.     NAZANIN spoke with Ling and she stated she is the only person that should be contacted when it comes to the discharge planning of the pt who is her mother. Ling stated that having all the other family members involved had made the process more complicated. Ling stated that we are allowed to give Jackie and her family information on the pt and how she is doing medically if the pt also agrees because she understands her families concerns, but she is the only one to be contacted about discharge planning.     This NAZANIN advised her that I would consult with the unit SW who has been working on the discharge planning and then we would discuss this case with the the doctors during rounds. This NAZANIN advised Ling that we would contact her later this afternoon around 1500 to give her an update on the discharge plan for the pt.     Plan: Discuss the discharge plan with the  unit SW and the MD during rounds.

## 2018-05-17 NOTE — PROGRESS NOTES
[facility] Hospitalist Progress Note    Date of Service: 2018    Chief Complaint  53 y.o. female admitted 4/10/2018 with altered mental status who was found to have evidence of multiple acute strokes and new onset seizure disorder    Interval Problem Update  Axox4 but poor memory  Eager to d/c home with family this weekend  Denies dizziness  Ambulation improving  Mild, intermittent chronic pain at baseline  ROS otherwise negative      Consultants/Specialty  Neurology    Disposition  Difficult placement in [place] - social work coordinating with family      Review of Systems   Constitutional: Negative.    HENT: Negative.    Eyes: Negative.    Respiratory: Negative.  Negative for shortness of breath.    Cardiovascular: Negative.  Negative for chest pain.   Gastrointestinal: Negative.    Genitourinary: Negative.    Musculoskeletal: Negative.    Skin: Negative.    Neurological: Negative.  Negative for dizziness, tingling, tremors, focal weakness, seizures and headaches.   Endo/Heme/Allergies: Negative.       Physical Exam  Laboratory/Imaging   Hemodynamics  Temp (24hrs), Av.8 °C (98.3 °F), Min:36.3 °C (97.3 °F), Max:37.2 °C (98.9 °F)   Temperature: 36.7 °C (98.1 °F)  Pulse  Av.9  Min: 12  Max: 157    Blood Pressure: 103/53     Respiratory      Respiration: 15, Pulse Oximetry: 91 %        RUL Breath Sounds: Clear, RML Breath Sounds: Clear, RLL Breath Sounds: Diminished, JOLYNN Breath Sounds: Clear, LLL Breath Sounds: Diminished    Fluids    Intake/Output Summary (Last 24 hours) at 18 0909  Last data filed at 18 2119   Gross per 24 hour   Intake              250 ml   Output                0 ml   Net              250 ml       Nutrition  Orders Placed This Encounter   Procedures   • DIET ORDER     Standing Status:   Standing     Number of Occurrences:   1     Order Specific Question:   Diet:     Answer:   Regular [1]     Order Specific Question:   Consistency/Fluid modifications:     Answer:   Thin Liquids [3]      Physical Exam   Constitutional: She is oriented to person, place, and time. She appears well-developed and well-nourished.   HENT:   Head: Normocephalic and atraumatic.   Eyes: Conjunctivae are normal. Right eye exhibits no discharge. Left eye exhibits no discharge. No scleral icterus.   Neck: Normal range of motion. No JVD present.   Cardiovascular: Normal rate and regular rhythm.  Exam reveals no gallop and no friction rub.    No murmur heard.  Pulmonary/Chest: Effort normal and breath sounds normal. No stridor. No respiratory distress. She has no wheezes. She has no rales. She exhibits no tenderness.   Abdominal: Soft. Bowel sounds are normal. She exhibits no distension and no mass. There is no tenderness. There is no rebound and no guarding.   Musculoskeletal: Normal range of motion. She exhibits no edema or deformity.   Lymphadenopathy:     She has no cervical adenopathy.   Neurological: She is alert and oriented to person, place, and time. She has normal reflexes. No cranial nerve deficit. Coordination normal.   Skin: Skin is warm and dry. No rash noted. No erythema. No pallor.   Psychiatric: She has a normal mood and affect. Her behavior is normal. Judgment and thought content normal.       Recent Labs      05/16/18   0255  05/17/18   0200   WBC  12.5*  15.1*   RBC  4.20  4.44   HEMOGLOBIN  12.7  13.5   HEMATOCRIT  38.4  40.6   MCV  91.4  91.4   MCH  30.2  30.4   MCHC  33.1*  33.3*   RDW  44.6  45.9   PLATELETCT  267  288   MPV  10.8  10.3                          Assessment/Plan     * Acute CVA (cerebrovascular accident) (HCC)   Assessment & Plan    Multifocal ischemic infarcts  Neurology has signed off  Carotid US normal.  Continue aspirin  LACI w bubble study showed PFO.  Patient wishing to treat medically at this time.  Seen by cardiology, they do not feel she is a candidate for loop at this time as she is returning to [place] at discharge, they recommend this be done in [place] so it can be  followed up there                Acute respiratory failure with hypoxia (HCC)- (present on admission)   Assessment & Plan    Likely secondary to seizure.  Required intubation on 4/26 and extubation on 4/27.  Pulmonology signed off  Resolved        Hypotension- (present on admission)   Assessment & Plan    Has had chronic intermittent episodes of unresponsiveness while ambulating secondary to orthostatic hypotension.   None recently  Continue midodrine  Slow taper of hydrocortisone, bp improved, no recent dizziness            Seizure disorder as sequela of cerebrovascular accident (HCC)- (present on admission)   Assessment & Plan    Neurology has signed off  No further seizure activity  Continue keppra.            Drug overdose - unintentional- (present on admission)   Assessment & Plan    Buprenorphine patch removed PTA.   Patient was on high doses of narcotics at home for chronic pain and fibromyalgia  Continue conservative use of narcotics, tapering down as tolerated                 Weakness   Assessment & Plan    Secondary to above.  PT/OT following.   Pending SNF placement.        Normocytic anemia- (present on admission)   Assessment & Plan    Stable.        Chronic pain syndrome- (present on admission)   Assessment & Plan    Pain controlled.  Tapering down as tolerated  Possible unintentional narcotics overdose suspected on admit          Campylobacter diarrhea- (present on admission)   Assessment & Plan    Campylobacter on cultures from transferring facility. C-diff negative here on 4/10/18.  Resolved.            Hyperglycemia   Assessment & Plan    A1C normal at 5.4. No hx of Dm.  This likely steroid induced.   Following            Leukocytosis- (present on admission)   Assessment & Plan    Fluctuating  Completed Unasyn and Azithro 4/16.  Procalcitonin was normal.  Clinically stable  Likely secondary to steroids.  Continue to follow            Elevated brain natriuretic peptide (BNP) level   Assessment &  Plan    BNP 8000s on arrival.   TTE showed normal diastolic function & EF 70%.  Remains euvolemic on exam            Elevated troponin- (present on admission)   Assessment & Plan    No CP or hx of CAD.  Normal echocardiogram.  Repeat trended down.   No need for further studies at this time, consider stress test as outpatient when other issues stable        Autonomic neuropathy- (present on admission)   Assessment & Plan    On midodrine and hydrocortisone  Following          Anorexia- (present on admission)   Assessment & Plan    Continue to encourage oral intake.  Appropriate caloric intake discussed with patient and family.  She has required a PEG in the past.  Dietician following.        Aspiration pneumonia (HCC)- (present on admission)   Assessment & Plan    Completed a course of Unasyn  Normal CT of chest  Normal exam                Quality-Core Measures

## 2018-05-17 NOTE — CARE PLAN
Problem: Pain Management  Goal: Pain level will decrease to patient's comfort goal  Outcome: PROGRESSING AS EXPECTED  Medicating for neck pain per MAR with + results   Offered non pharm interventions, pt declined     Problem: Mobility  Goal: Risk for activity intolerance will decrease  Outcome: PROGRESSING AS EXPECTED  Reinforced to pt that abdominal binder needs to be worn while ambulating

## 2018-05-17 NOTE — DISCHARGE PLANNING
Medical Social Work     Referral: Rounds    During rounds today, 5/17/18 discharge planning was discussed with the MD. The MD advised NAZANIN that she is fine with the pt discharging as long as the pt has a follow up appointment with in a week of discharge.     NAZANIN called the pt daughter Ling and advised that the pt will be able to discharge if she can get a follow up appointment for the pt with in a week. Ling stated the pt was established with a regular MD and she would try to get her in with in a week. Ling is working on getting the pt an appointment for the pt. Discharge is still planned for tomorrow, 5/18/18.     Ling also advised NAZANIN that they will not be able to be here to  the pt until about 1600 tomorrow, 5/17/18.     Plan: discharge is planned for tomorrow after 1600 and Ling is working on obtaining a follow up appointment for the pt.

## 2018-05-17 NOTE — PROGRESS NOTES
"Pt A&Ox4, VALENCIA, c/o neck pain, medicated per MAR with + results. Pt denies N/T, ambulating up to bathroom with SBA wearing abdominal binder, voiding without difficulty. Pt tolerating regular diet with thins, no s/s of aspiration.     Pt requesting to speak with SW. Pt reports that her daughter Ling is not communicating effectively about discharge planning and her daughter is \"involving too many people in my business\", such as Jackie. Pt requested that Angel who she reports as her boyfriend, be primarily involved in planning discharge.   "

## 2018-05-18 VITALS
HEART RATE: 77 BPM | TEMPERATURE: 99.1 F | SYSTOLIC BLOOD PRESSURE: 96 MMHG | HEIGHT: 66 IN | DIASTOLIC BLOOD PRESSURE: 60 MMHG | WEIGHT: 114.86 LBS | BODY MASS INDEX: 18.46 KG/M2 | OXYGEN SATURATION: 95 % | RESPIRATION RATE: 15 BRPM

## 2018-05-18 PROBLEM — A04.5 CAMPYLOBACTER DIARRHEA: Status: RESOLVED | Noted: 2018-04-11 | Resolved: 2018-05-18

## 2018-05-18 PROBLEM — J96.01 ACUTE RESPIRATORY FAILURE WITH HYPOXIA (HCC): Status: RESOLVED | Noted: 2018-04-26 | Resolved: 2018-05-18

## 2018-05-18 LAB
BASOPHILS # BLD AUTO: 0.5 % (ref 0–1.8)
BASOPHILS # BLD: 0.07 K/UL (ref 0–0.12)
EOSINOPHIL # BLD AUTO: 0.11 K/UL (ref 0–0.51)
EOSINOPHIL NFR BLD: 0.8 % (ref 0–6.9)
ERYTHROCYTE [DISTWIDTH] IN BLOOD BY AUTOMATED COUNT: 46 FL (ref 35.9–50)
HCT VFR BLD AUTO: 41.6 % (ref 37–47)
HGB BLD-MCNC: 13.8 G/DL (ref 12–16)
IMM GRANULOCYTES # BLD AUTO: 0.11 K/UL (ref 0–0.11)
IMM GRANULOCYTES NFR BLD AUTO: 0.8 % (ref 0–0.9)
LYMPHOCYTES # BLD AUTO: 3.47 K/UL (ref 1–4.8)
LYMPHOCYTES NFR BLD: 24.5 % (ref 22–41)
MCH RBC QN AUTO: 30.3 PG (ref 27–33)
MCHC RBC AUTO-ENTMCNC: 33.2 G/DL (ref 33.6–35)
MCV RBC AUTO: 91.2 FL (ref 81.4–97.8)
MONOCYTES # BLD AUTO: 0.83 K/UL (ref 0–0.85)
MONOCYTES NFR BLD AUTO: 5.8 % (ref 0–13.4)
NEUTROPHILS # BLD AUTO: 9.6 K/UL (ref 2–7.15)
NEUTROPHILS NFR BLD: 67.6 % (ref 44–72)
NRBC # BLD AUTO: 0 K/UL
NRBC BLD-RTO: 0 /100 WBC
PLATELET # BLD AUTO: 262 K/UL (ref 164–446)
PMV BLD AUTO: 10.2 FL (ref 9–12.9)
RBC # BLD AUTO: 4.56 M/UL (ref 4.2–5.4)
WBC # BLD AUTO: 14.2 K/UL (ref 4.8–10.8)

## 2018-05-18 PROCEDURE — 36415 COLL VENOUS BLD VENIPUNCTURE: CPT

## 2018-05-18 PROCEDURE — 700102 HCHG RX REV CODE 250 W/ 637 OVERRIDE(OP): Performed by: HOSPITALIST

## 2018-05-18 PROCEDURE — 99239 HOSP IP/OBS DSCHRG MGMT >30: CPT | Performed by: HOSPITALIST

## 2018-05-18 PROCEDURE — A9270 NON-COVERED ITEM OR SERVICE: HCPCS | Performed by: HOSPITALIST

## 2018-05-18 PROCEDURE — 700111 HCHG RX REV CODE 636 W/ 250 OVERRIDE (IP): Performed by: HOSPITALIST

## 2018-05-18 PROCEDURE — 85025 COMPLETE CBC W/AUTO DIFF WBC: CPT

## 2018-05-18 RX ORDER — ATORVASTATIN CALCIUM 40 MG/1
40 TABLET, FILM COATED ORAL DAILY
Qty: 30 TAB | Refills: 0 | Status: SHIPPED | OUTPATIENT
Start: 2018-05-18

## 2018-05-18 RX ORDER — ASPIRIN 81 MG/1
81 TABLET, CHEWABLE ORAL DAILY
Qty: 100 TAB | COMMUNITY
Start: 2018-05-19 | End: 2018-05-18

## 2018-05-18 RX ORDER — LEVETIRACETAM 500 MG/1
500 TABLET ORAL 2 TIMES DAILY
Qty: 60 TAB | Refills: 0 | Status: SHIPPED | OUTPATIENT
Start: 2018-05-18

## 2018-05-18 RX ORDER — ASPIRIN 81 MG/1
81 TABLET, CHEWABLE ORAL DAILY
Qty: 100 TAB | COMMUNITY
Start: 2018-05-19

## 2018-05-18 RX ADMIN — MIDODRINE HYDROCHLORIDE 10 MG: 5 TABLET ORAL at 16:17

## 2018-05-18 RX ADMIN — ASPIRIN 81 MG: 81 TABLET, CHEWABLE ORAL at 07:51

## 2018-05-18 RX ADMIN — PREGABALIN 150 MG: 75 CAPSULE ORAL at 07:51

## 2018-05-18 RX ADMIN — LEVETIRACETAM 500 MG: 500 TABLET, FILM COATED ORAL at 07:52

## 2018-05-18 RX ADMIN — ENOXAPARIN SODIUM 40 MG: 100 INJECTION SUBCUTANEOUS at 07:52

## 2018-05-18 RX ADMIN — HYDROCORTISONE 10 MG: 10 TABLET ORAL at 16:18

## 2018-05-18 RX ADMIN — HYDROCORTISONE 10 MG: 10 TABLET ORAL at 07:51

## 2018-05-18 RX ADMIN — OXYCODONE HYDROCHLORIDE 5 MG: 5 TABLET ORAL at 08:28

## 2018-05-18 RX ADMIN — MIDODRINE HYDROCHLORIDE 10 MG: 5 TABLET ORAL at 12:13

## 2018-05-18 RX ADMIN — MIDODRINE HYDROCHLORIDE 10 MG: 5 TABLET ORAL at 07:52

## 2018-05-18 ASSESSMENT — PAIN SCALES - GENERAL: PAINLEVEL_OUTOF10: 6

## 2018-05-18 NOTE — CARE PLAN
Problem: Pain Management  Goal: Pain level will decrease to patient's comfort goal  Outcome: PROGRESSING AS EXPECTED  Medicating for neck pain per MAR with + results   Offered non pharm interventions, pt declined     Problem: Mobility  Goal: Risk for activity intolerance will decrease  Outcome: PROGRESSING AS EXPECTED  Reinforced to pt that abdominal binder needs to be worn while ambulating   Pt needs reminders to call for assistance before getting out of bed

## 2018-05-18 NOTE — PROGRESS NOTES
Assumed care of patient at 0700.  Received report from night RN.  Pt alert and oriented x4, has no complaints of pain. Pt resting comfortably in bed. Pt happy to be going home today.

## 2018-05-18 NOTE — DISCHARGE INSTRUCTIONS
Follow up with pcp in [place] by next week   Ongoing outpatient bp monitoring   Cardiology follow up in [place] with loop recorder recommend   Return to er if needed  Discharge Instructions    Discharged to home by car with friend. Discharged via wheelchair, hospital escort: Yes.  Special equipment needed: Not Applicable    Be sure to schedule a follow-up appointment with your primary care doctor or any specialists as instructed.     Discharge Plan:   Influenza Vaccine Indication: Patient Refuses    I understand that a diet low in cholesterol, fat, and sodium is recommended for good health. Unless I have been given specific instructions below for another diet, I accept this instruction as my diet prescription.   Other diet: regular    Special Instructions: None    · Is patient discharged on Warfarin / Coumadin?   No     Depression / Suicide Risk    As you are discharged from this [facility] facility, it is important to learn how to keep safe from harming yourself.    Recognize the warning signs:  · Abrupt changes in personality, positive or negative- including increase in energy   · Giving away possessions  · Change in eating patterns- significant weight changes-  positive or negative  · Change in sleeping patterns- unable to sleep or sleeping all the time   · Unwillingness or inability to communicate  · Depression  · Unusual sadness, discouragement and loneliness  · Talk of wanting to die  · Neglect of personal appearance   · Rebelliousness- reckless behavior  · Withdrawal from people/activities they love  · Confusion- inability to concentrate     If you or a loved one observes any of these behaviors or has concerns about self-harm, here's what you can do:  · Talk about it- your feelings and reasons for harming yourself  · Remove any means that you might use to hurt yourself (examples: pills, rope, extension cords, firearm)  · Get professional help from the community (Mental Health, Substance Abuse,  psychological counseling)  · Do not be alone:Call your Safe Contact- someone whom you trust who will be there for you.  · Call your local CRISIS HOTLINE 969-4883 or 708-237-1196  · Call your local Children's Mobile Crisis Response Team [place] (237) 557-6496 or www.zoojoo.BE  · Call the toll free National Suicide Prevention Hotlines   · National Suicide Prevention Lifeline 469-007-QSOK (3884)  · National Hope Line Network 800-SUICIDE (733-4489)

## 2018-05-18 NOTE — PROGRESS NOTES
Pt alert, off by one day. C/o neck pain, medicating PRN with + results. VALENCIA, denies N/T. Pt needing reminders to not get out of bed without assistance, needs reminders to wear abdominal binder while up but pt refusing at times. Pt anxious to discharge tomorrow, reports someone will be here to pick her up around 4pm.

## 2018-05-18 NOTE — DISCHARGE SUMMARY
CHIEF COMPLAINT ON ADMISSION  Chief Complaint   Patient presents with   • ALOC       CODE STATUS  Full Code    HPI & HOSPITAL COURSE  Please see history and physical dictated by Dr. Parker on 4/10/18 for further details.  This is a 53 y.o. Female with a history of chronic narcotic use and chronic orthostatic hypotension who was admitted here with decreased level of consciousness.  She had evidence of seizures (which are now controlled on keppra) and a narcotic overdose (reportedly unintentional) and improved with narcan.  She as also found to have evidence of multiple acute strokes and hypotension (which is reportedly chronic).  She was also successfully treated for aspiration pneumonia and acute renal failure.    She was tapered off of most of her chronic narcotics and has been doing well off of them.  She was reportedly taking them for diffuse aches and pains related to fibromyalgia.  It is my recommendation that these not be restarted.  In terms of her hypotension, her chronic steroids were temporarily increased but she is now back at her preadmission regimen of hydrocortisone and midodrine and I recommend ongoing monitoring and further titration as an outpatient.    The initial plan was for patient to go to rehab or skilled nursing for post stroke rehabilitation, however due to her out of state insurance placement was very difficult.  Family has now made arrangements to continue to care for patient at home with outpatient follow up.    Her initial troponin was minimally elevated and this was likely due to the overdose and hypotension, an echo was unremarkable.  Further cardiology work up may be needed as an outpatient. A loop recorder was recommended to evaluate etiology of her cryptogenic stroke, however due to follow up concerns, Cardiology did not want to place this here.  They recommended that this be done in [place].  Patient agrees to this plan.    The patient met 2-midnight criteria for an inpatient stay  at the time of discharge.    Therefore, she is discharged in fair and stable condition with close outpatient follow-up.    SPECIFIC OUTPATIENT FOLLOW-UP  PCP and Cardiology in [place]    DISCHARGE PROBLEM LIST  Principal Problem:    Acute CVA (cerebrovascular accident) (HCC) POA: Clinically Undetermined  Active Problems:    Seizure disorder as sequela of cerebrovascular accident (HCC) POA: Yes    Hypotension POA: Yes    Drug overdose - unintentional POA: Yes    Aspiration pneumonia (HCC) (Chronic) POA: Yes    Anorexia POA: Yes    Autonomic neuropathy POA: Yes    Elevated troponin POA: Yes    Leukocytosis POA: Yes    Chronic pain syndrome POA: Yes    Normocytic anemia POA: Yes    Weakness POA: Unknown  Resolved Problems:    Acute respiratory failure with hypoxia (HCC) POA: Yes    Acute kidney injury (HCC) POA: Yes    Lactic acidosis POA: Yes    Hyponatremia POA: Yes    Campylobacter diarrhea POA: Yes    Hypokalemia POA: Unknown    Physical Exam   Constitutional: She is oriented to person, place, and time. She appears well-developed and well-nourished.   HENT:   Head: Normocephalic and atraumatic.   Eyes: Conjunctivae are normal. Right eye exhibits no discharge. Left eye exhibits no discharge. No scleral icterus.   Neck: Normal range of motion. No JVD present.   Cardiovascular: Normal rate and regular rhythm.  Exam reveals no gallop and no friction rub.    No murmur heard.  Pulmonary/Chest: Effort normal and breath sounds normal. No stridor. No respiratory distress. She has no wheezes. She has no rales. She exhibits no tenderness.   Abdominal: Soft. Bowel sounds are normal. She exhibits no distension and no mass. There is no tenderness. There is no rebound and no guarding.   Musculoskeletal: Normal range of motion. She exhibits no edema or deformity.   Lymphadenopathy:     She has no cervical adenopathy.   Neurological: She is alert and oriented to person, place, and time. She has normal reflexes. No cranial nerve  deficit. Coordination normal.   Skin: Skin is warm and dry. No rash noted. No erythema. No pallor.   Psychiatric: She has a normal mood and affect. Her behavior is normal. Judgment and thought content normal.               Recent Labs      05/16/18   0255  05/17/18   0200   WBC  12.5*  15.1*   RBC  4.20  4.44   HEMOGLOBIN  12.7  13.5   HEMATOCRIT  38.4  40.6   MCV  91.4  91.4   MCH  30.2  30.4   MCHC  33.1*  33.3*   RDW  44.6  45.9   PLATELETCT  267  288   MPV  10.8  10.3                                   FOLLOW UP  No future appointments.  Your Primary Care Doctor    Schedule an appointment as soon as possible for a visit in 2 weeks  Follow Up    PCP next week in [place]  Cardiology in [place]  Do not drive (pt states she does not drive now and is not planning on starting)    MEDICATIONS ON DISCHARGE   Shaun Shasta   Home Medication Instructions RUBY:[id number]    Printed on:05/18/18 1011   Medication Information                      aspirin (ASA) 81 MG Chew Tab chewable tablet  1 Tab by Per NG Tube route every day.             estradiol (VIVELLE DOT) 0.1 MG/24HR PATCH BIWEEKLY  Apply 1 Patch to skin as directed 2X A WEEK.    Lipitor 40 mg po daily           hydrocortisone (CORTEF) 10 MG Tab  Take 50 mg by mouth every 8 hours.             levETIRAcetam (KEPPRA) 500 MG Tab  Take 1 Tab by mouth 2 Times a Day.             midodrine (PROAMATINE) 10 MG tablet  Take 10 mg by mouth 4 times a day.             pregabalin (LYRICA) 150 MG Cap  Take 150 mg by mouth 2 Times a Day.                 DIET  Orders Placed This Encounter   Procedures   • DIET ORDER     Standing Status:   Standing     Number of Occurrences:   1     Order Specific Question:   Diet:     Answer:   Regular [1]     Order Specific Question:   Consistency/Fluid modifications:     Answer:   Thin Liquids [3]       ACTIVITY  As tolerated.      CONSULTATIONS  Neurology  Critical Care      PROCEDURES  CT-HEAD W/O   Final Result      Expected evolution of subacute  left frontal infarction      DX-CHEST-PORTABLE (1 VIEW)   Final Result      1.  Supportive tubing as described above.   2.  No other significant change from prior exam.            [id number] VIEW   Final Result      Feeding tube tip overlies the gastric antrum.      DX-CHEST-PORTABLE (1 VIEW)   Final Result      1.  Supportive tubing as described above.   2.  No other significant change from prior exam.         DX-ABDOMEN FOR TUBE PLACEMENT   Final Result      1.  Feeding tube tip overlies the gastric antrum or 1st portion of the duodenum.      CT-HEAD W/O   Final Result      Findings of a left frontoparietal infarction are again noted with development of cortical laminar necrosis.      There are mild periventricular and subcortical white matter changes present.  This finding is nonspecific and could be from previous small vessel ischemia, demyelination, or gliosis.         DX-CHEST-PORTABLE (1 VIEW)   Final Result      Placement of right IJ central line with tip just below the SVC/right atrial junction without evidence of pneumothorax.      DX-CHEST-LIMITED (1 VIEW)   Final Result      Endotracheal tube projects at the level of the clavicular heads.         DX-CHEST-PORTABLE (1 VIEW)   Final Result         1. No acute cardiopulmonary abnormalities are identified.      TRANSESOPHAGEAL ECHO W/O CONT   Final Result      CT-CHEST (THORAX) WITH   Final Result      1.  Minimal linear atelectasis or fibrotic change in the posterior aspect of the right lower lobe.      2.  Otherwise clear chest.      3.  No thoracic adenopathy or pleural effusion.      4.  Cholecystectomy.            CAROTID DUPLEX   Final Result      MR-BRAIN-WITH & W/O   Final Result         1.  Multifocal areas of acute infarction with the largest area of infarction located superiorly in the left frontal lobe extending to the cortex. Much smaller areas of acute infarction are noted in the left posterior occipital lobe, right    parietal-occipital  cortex, and right hemicerebellum.      2.  Mild periventricular and juxtacortical white matter changes consistent with chronic microvascular ischemic gliosis.      ECHOCARDIOGRAM COMP W/O CONT   Final Result      OUTSIDE IMAGES-DX CHEST   Final Result      OUTSIDE IMAGES-CT HEAD   Final Result            LABORATORY  Lab Results   Component Value Date/Time    SODIUM 138 05/03/2018 02:51 AM    POTASSIUM 4.3 05/03/2018 02:51 AM    CHLORIDE 102 05/03/2018 02:51 AM    CO2 28 05/03/2018 02:51 AM    GLUCOSE 107 (H) 05/03/2018 02:51 AM    BUN 21 05/03/2018 02:51 AM    CREATININE 0.58 05/03/2018 02:51 AM        Lab Results   Component Value Date/Time    WBC 14.2 (H) 05/18/2018 03:56 AM    HEMOGLOBIN 13.8 05/18/2018 03:56 AM    HEMATOCRIT 41.6 05/18/2018 03:56 AM    PLATELETCT 262 05/18/2018 03:56 AM        Total time of the discharge process exceeds 40 minutes

## 2018-05-19 NOTE — PROGRESS NOTES
Pt d/c'd home per md order. Pt alert and oriented x4, no complaints of pain.  Boyfriend picked pt up.  PT left via w/c by staff to boyfriend's car.   Discharge instructions discussed and sent with pt.  Prescriptions electronically sent to pharmacy in [facility].

## 2018-05-24 ENCOUNTER — HOSPITAL ENCOUNTER (EMERGENCY)
Dept: HOSPITAL 94 - ER | Age: 53
Discharge: HOME | End: 2018-05-24
Payer: MEDICAID

## 2018-05-24 VITALS — SYSTOLIC BLOOD PRESSURE: 95 MMHG | DIASTOLIC BLOOD PRESSURE: 66 MMHG

## 2018-05-24 VITALS — WEIGHT: 110.23 LBS | BODY MASS INDEX: 18.37 KG/M2 | HEIGHT: 65 IN

## 2018-05-24 DIAGNOSIS — G89.29: ICD-10-CM

## 2018-05-24 DIAGNOSIS — Z90.710: ICD-10-CM

## 2018-05-24 DIAGNOSIS — Z90.49: ICD-10-CM

## 2018-05-24 DIAGNOSIS — M10.9: ICD-10-CM

## 2018-05-24 DIAGNOSIS — M79.7: ICD-10-CM

## 2018-05-24 DIAGNOSIS — I95.1: Primary | ICD-10-CM

## 2018-05-24 LAB
ALBUMIN SERPL BCP-MCNC: 3.8 G/DL (ref 3.4–5)
ALBUMIN/GLOB SERPL: 1.1 {RATIO} (ref 1.1–1.5)
ALP SERPL-CCNC: 63 IU/L (ref 46–116)
ALT SERPL W P-5'-P-CCNC: 88 U/L (ref 12–78)
ANION GAP SERPL CALCULATED.3IONS-SCNC: 8 MMOL/L (ref 8–16)
AST SERPL W P-5'-P-CCNC: 36 U/L (ref 10–37)
BASOPHILS # BLD AUTO: 0 X10'3 (ref 0–0.2)
BASOPHILS NFR BLD AUTO: 0.3 % (ref 0–1)
BILIRUB SERPL-MCNC: 0.7 MG/DL (ref 0.1–1)
BUN SERPL-MCNC: 29 MG/DL (ref 7–18)
BUN/CREAT SERPL: 14.6 (ref 6.6–38)
CALCIUM SERPL-MCNC: 8.9 MG/DL (ref 8.5–10.1)
CHLORIDE SERPL-SCNC: 107 MMOL/L (ref 99–107)
CLARITY UR: CLEAR
CO2 SERPL-SCNC: 25.4 MMOL/L (ref 24–32)
COLOR UR: (no result)
CREAT SERPL-MCNC: 1.99 MG/DL (ref 0.4–0.9)
EOSINOPHIL # BLD AUTO: 0.1 X10'3 (ref 0–0.9)
EOSINOPHIL NFR BLD AUTO: 0.8 % (ref 0–6)
ERYTHROCYTE [DISTWIDTH] IN BLOOD BY AUTOMATED COUNT: 14.3 % (ref 11.5–14.5)
GFR SERPL CREATININE-BSD FRML MDRD: 26 ML/MIN
GLUCOSE SERPL-MCNC: 119 MG/DL (ref 70–104)
GLUCOSE UR STRIP-MCNC: NEGATIVE MG/DL
HCT VFR BLD AUTO: 43.8 % (ref 35–45)
HGB BLD-MCNC: 14.9 G/DL (ref 12–16)
HGB UR QL STRIP: NEGATIVE
KETONES UR STRIP-MCNC: NEGATIVE MG/DL
LEUKOCYTE ESTERASE UR QL STRIP: NEGATIVE
LYMPHOCYTES # BLD AUTO: 4 X10'3 (ref 1.1–4.8)
LYMPHOCYTES NFR BLD AUTO: 26 % (ref 21–51)
MCH RBC QN AUTO: 30.5 PG (ref 27–31)
MCHC RBC AUTO-ENTMCNC: 34.1 % (ref 33–36.5)
MCV RBC AUTO: 89.5 FL (ref 78–98)
MONOCYTES # BLD AUTO: 0.6 X10'3 (ref 0–0.9)
MONOCYTES NFR BLD AUTO: 3.8 % (ref 2–12)
NEUTROPHILS # BLD AUTO: 10.6 X10'3 (ref 1.8–7.7)
NEUTROPHILS NFR BLD AUTO: 69.1 % (ref 42–75)
NITRITE UR QL STRIP: NEGATIVE
PH UR STRIP: 5.5 [PH] (ref 4.8–8)
PLATELET # BLD AUTO: 262 X10'3 (ref 140–440)
PMV BLD AUTO: 8.6 FL (ref 7.4–10.4)
POTASSIUM SERPL-SCNC: 4.6 MMOL/L (ref 3.5–5.1)
PROT SERPL-MCNC: 7.3 G/DL (ref 6.4–8.2)
PROT UR QL STRIP: NEGATIVE MG/DL
RBC # BLD AUTO: 4.9 X10'6 (ref 4.2–5.6)
SODIUM SERPL-SCNC: 140 MMOL/L (ref 135–145)
SP GR UR STRIP: 1.01 (ref 1–1.03)
URN COLLECT METHOD CLASS: (no result)
UROBILINOGEN UR STRIP-MCNC: 0.2 E.U/DL (ref 0.2–1)
WBC # BLD AUTO: 15.4 X10'3 (ref 4.5–11)

## 2018-05-24 PROCEDURE — 36415 COLL VENOUS BLD VENIPUNCTURE: CPT

## 2018-05-24 PROCEDURE — 99285 EMERGENCY DEPT VISIT HI MDM: CPT

## 2018-05-24 PROCEDURE — 93005 ELECTROCARDIOGRAM TRACING: CPT

## 2018-05-24 PROCEDURE — 80053 COMPREHEN METABOLIC PANEL: CPT

## 2018-05-24 PROCEDURE — 81003 URINALYSIS AUTO W/O SCOPE: CPT

## 2018-05-24 PROCEDURE — 85025 COMPLETE CBC W/AUTO DIFF WBC: CPT

## 2018-05-24 PROCEDURE — 84484 ASSAY OF TROPONIN QUANT: CPT

## 2018-05-24 NOTE — ADDENDUM NOTE
Encounter addended by: Kasandra Montero R.N. on: 5/24/2018 12:07 PM<BR>    Actions taken: Flowsheet accepted

## 2021-10-07 NOTE — THERAPY
October 7, 2021      Noris Mahmood  [address] [place] [address]        Dear Noris,      I am pleased to inform you that results of your labs are stable. If you have any questions or concerns, please do not hesitate to call my office.      Sincerely,      Faye Renner M.D.       [facility] - [address] [address] [phone]     "Speech Language Therapy aphasia and dysphagia tx completed.   Functional Status:  Tolerating dys3/thins diet. Targeted reading comprehension. Work sheets left at bedside for pt to complete on own.   Recommendations: Advance to regular diet. Hold with any difficulty.  Plan of Care: Will benefit from Speech Therapy 5 times per week  Post-Acute Therapy: Discharge to home with outpatient or home health for additional skilled therapy services.    See \"Rehab Therapy-Acute\" Patient Summary Report for complete documentation.     "

## 2022-07-16 NOTE — PROGRESS NOTES
NEUROLOGY PROGRESS NOTE      Background:  52 y.o. female was admitted on 4/10/2018  6:43 AM for Overdose  Overdose.  She is being followed by Neurology for possible seizure.    SUBJECTIVE: She remains nonverbal for most part, although, she is still say a few simple words. No seizure activity reported.    NEUROLOGICAL EXAM:  She is awake, alert, able to track and say few words, but mostly   she is nonverbal.  She is able to follow command.  Her pupils are equal and   reactive.  Extraocular movements are full.  Visual fields are full to   confrontation.  Hearing is intact to finger rub bilaterally.  Tongue is   midline and protrudes symmetrically.  Palate elevates symmetrically.  Shoulder   shrugs are normal.  Motor examination revealed normal strength to direct   testing of both upper and lower extremity, proximal and distal.  Sensation   intact to light touch, temperature, and pinprick.  Coordination is intact to   finger-to-nose testing.  She was not able to perform heel-to-shin testing.    Deep tendon reflexes are 1+ and equal.  Plantar reflexes are downgoing    OBJECTIVE:     NEUROIMAGING:    Brain MRI:  1.  Multifocal areas of acute infarction with the largest area of infarction located superiorly in the left frontal lobe extending to the cortex. Much smaller areas of acute infarction are noted in the left posterior occipital lobe, right   parietal-occipital cortex, and right hemicerebellum.    2.  Mild periventricular and juxtacortical white matter changes consistent with chronic microvascular ischemic gliosis.    EEG:   EEG 04/10/18 2:49 PM  This scalp EEG is consistent with                  Focal cortical dysfunction and focal irritability over the left                 Probable focal seizures from the left      Advise anti-seizure medication     There are no definite electrographic seizures in this record though       MEDICATIONS:  Current Facility-Administered Medications   Medication Dose   • potassium  "chloride SA (Kdur) tablet 60 mEq  60 mEq   • HYDROcodone-acetaminophen (NORCO) 5-325 MG per tablet 1 Tab  1 Tab   • LORazepam (ATIVAN) injection 0.5 mg  0.5 mg   • azithromycin (ZITHROMAX) tablet 500 mg  500 mg   • ampicillin/sulbactam (UNASYN) 3 g in  mL IVPB  3 g   • miconazole 2%-zinc oxide (TOM) topical cream     • acetaminophen (TYLENOL) tablet 650 mg  650 mg   • labetalol (NORMODYNE,TRANDATE) injection 5 mg  5 mg   • aspirin EC (ECOTRIN) tablet 81 mg  81 mg   • senna-docusate (PERICOLACE or SENOKOT S) 8.6-50 MG per tablet 2 Tab  2 Tab    And   • polyethylene glycol/lytes (MIRALAX) PACKET 1 Packet  1 Packet    And   • magnesium hydroxide (MILK OF MAGNESIA) suspension 30 mL  30 mL    And   • bisacodyl (DULCOLAX) suppository 10 mg  10 mg   • NS infusion     • heparin injection 5,000 Units  5,000 Units   • hydrocortisone (CORTEF) tablet 5 mg  5 mg   • Respiratory Care per Protocol     • levETIRAcetam (KEPPRA) 100 MG/ML solution 500 mg  500 mg       Blood pressure 159/92, pulse (!) 102, temperature 37.3 °C (99.1 °F), resp. rate (!) 22, height 1.676 m (5' 6\"), weight 58 kg (127 lb 13.9 oz), SpO2 97 %.    Recent Labs      04/10/18   0330  04/10/18   0736  04/10/18   1550   CPKTOTAL  83  126   --    TROPONINI  0.21*   --   0.25*   BNPBTYPENAT  8030   --    --      Recent Labs      04/10/18   0330  04/11/18   0720  04/12/18   0235   WBC  16.0*  13.5*  19.0*   RBC  4.31  3.72*  4.27   HEMOGLOBIN  13.0  11.2*  12.8   HEMATOCRIT  39.4  33.6*  37.8   MCV  91.4  90.3  88.5   MCH  30.2  30.1  30.0   MCHC  33.0  33.3*  33.9   RDW  14.2  45.1  43.5   PLATELETCT  174  132*  147*   MPV  11.0*  11.0  10.6     Recent Labs      04/10/18   0330  04/10/18   0736  04/11/18   0720  04/12/18   0235   SODIUM  134*   --   143  143   POTASSIUM  4.7   --   3.8  3.0*   CHLORIDE  103   --   111  109   CO2  23   --   22  22   GLUCOSE  130*   --   99  99   BUN  26*   --   19  16   CPKTOTAL  83  126   --    --        Results for orders " placed or performed during the hospital encounter of 04/10/18   ECHOCARDIOGRAM COMP W/O CONT   Result Value Ref Range    Eject.Frac. MOD BP 73.64     Eject.Frac. MOD 4C 78.09     Eject.Frac. MOD 2C 67.55     Left Ventrical Ejection Fraction 70         ASSESSMENT AND PLAN:   A 52-year-old female who was found unresponsive,   incontinent of urine and bowel with evidence of narcotic overdose, which has   improved after receiving Narcan.   Her EEG revealed focal seizure from left.   She was started on Keppra 500 mg twice a day.   She remains nonverbal and her brain MRI revealed multifocal areas of acute infarction with the largest area of infarction located superiorly in the left frontal lobe extending to the cortex. Much smaller areas of acute infarction are noted in the left posterior occipital lobe, right parietal-occipital cortex, and right hemicerebellum.  Her echo noted as above, with ejection fraction of 70%. We'll obtain carotid ultrasound. Will check profile. She will continue with aspirin. She will continue with physical therapy and speech therapy.   Tone is normal, moving all extremities well, reflexes normal for age.

## 2022-07-26 NOTE — PROGRESS NOTES
Pt expressed wanting to discharge home today because she was not sure she would have a ride home tomorrow. RN explained to pt that she could be provided a cab if discharged tomorrow. Pt agreeable. Pt was ambulating to restroom with RN assistance when began to feel dizzy and off balance. RN sat pt on floor, vital sighns were take and are as follows. Temp-98.6 f, pulse-104, RR-16, BP 75/44, O2 94% on 2L. Hospitalist paged and updated, new order for x-ray received. Pt has fluids currently running at 100cc/hr. Fall precautions in place, will continue to monitor.

## 2023-04-04 ENCOUNTER — HOSPITAL ENCOUNTER (OUTPATIENT)
Dept: HOSPITAL 94 - CARD DIAG | Age: 58
Discharge: HOME | End: 2023-04-04
Attending: INTERNAL MEDICINE
Payer: MEDICAID

## 2023-04-04 VITALS — SYSTOLIC BLOOD PRESSURE: 101 MMHG | DIASTOLIC BLOOD PRESSURE: 60 MMHG

## 2023-04-04 VITALS — DIASTOLIC BLOOD PRESSURE: 62 MMHG | SYSTOLIC BLOOD PRESSURE: 102 MMHG

## 2023-04-04 VITALS — SYSTOLIC BLOOD PRESSURE: 85 MMHG | DIASTOLIC BLOOD PRESSURE: 26 MMHG

## 2023-04-04 VITALS — DIASTOLIC BLOOD PRESSURE: 36 MMHG | SYSTOLIC BLOOD PRESSURE: 63 MMHG

## 2023-04-04 VITALS — SYSTOLIC BLOOD PRESSURE: 117 MMHG | DIASTOLIC BLOOD PRESSURE: 75 MMHG

## 2023-04-04 VITALS — DIASTOLIC BLOOD PRESSURE: 44 MMHG | SYSTOLIC BLOOD PRESSURE: 56 MMHG

## 2023-04-04 VITALS — DIASTOLIC BLOOD PRESSURE: 27 MMHG | SYSTOLIC BLOOD PRESSURE: 47 MMHG

## 2023-04-04 VITALS — DIASTOLIC BLOOD PRESSURE: 77 MMHG | SYSTOLIC BLOOD PRESSURE: 154 MMHG

## 2023-04-04 DIAGNOSIS — R42: Primary | ICD-10-CM

## 2023-04-04 DIAGNOSIS — I95.9: ICD-10-CM

## 2023-04-04 PROCEDURE — 93660 TILT TABLE EVALUATION: CPT

## 2024-08-08 NOTE — PROGRESS NOTES
Detail Level: Generalized Monitor Summary    Sinus rhythm to sinus tachycardia, rate 90's-100's bpm.  Occasional, short runs of sinus bradycardia, rate 40's-50's bpm.    0.18 / 0.08 / 0.36     Detail Level: Simple Detail Level: Detailed

## 2025-01-27 ENCOUNTER — HOSPITAL ENCOUNTER (OUTPATIENT)
Dept: HOSPITAL 94 - MRI | Age: 60
Discharge: HOME | End: 2025-01-27
Attending: PODIATRIST
Payer: MEDICAID

## 2025-01-27 DIAGNOSIS — M21.6X2: ICD-10-CM
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
## 2025-01-27 DIAGNOSIS — Z98.890: ICD-10-CM

## 2025-01-27 DIAGNOSIS — S93.692A: ICD-10-CM

## 2025-01-27 DIAGNOSIS — M76.62: ICD-10-CM
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
## 2025-01-27 DIAGNOSIS — S93.432A: Primary | ICD-10-CM

## 2025-01-27 DIAGNOSIS — M87.073: ICD-10-CM

## 2025-01-27 DIAGNOSIS — M84.476A: ICD-10-CM

## 2025-01-27 DIAGNOSIS — M25.475: ICD-10-CM
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
## 2025-01-27 LAB
ALBUMIN SERPL BCP-MCNC: 3.7 G/DL (ref 3.4–5)
ANION GAP SERPL CALCULATED.3IONS-SCNC: 10 MMOL/L (ref 8–16)
BUN SERPL-MCNC: 40 MG/DL (ref 7–18)
BUN/CREAT SERPL: 25.8 (ref 10–20)
CALCIUM SERPL-MCNC: 8.9 MG/DL (ref 8.5–10.1)
CHLORIDE SERPL-SCNC: 107 MMOL/L (ref 99–107)
CO2 SERPL-SCNC: 24.1 MMOL/L (ref 24–32)
CREAT CL PREDICTED SERPL C-G-VRATE: (no result) ML/MIN
CREAT SERPL-MCNC: 1.55 MG/DL (ref 0.4–0.9)
GFR SERPL CREATININE-BSD FRML MDRD: 34 ML/MIN
GLUCOSE SERPL-MCNC: 74 MG/DL (ref 70–104)
POTASSIUM SERPL-SCNC: 4 MMOL/L (ref 3.5–5.1)
SODIUM SERPL-SCNC: 141 MMOL/L (ref 135–145)

## 2025-01-27 PROCEDURE — 80048 BASIC METABOLIC PNL TOTAL CA: CPT

## 2025-01-27 PROCEDURE — 73721 MRI JNT OF LWR EXTRE W/O DYE: CPT

## 2025-01-27 PROCEDURE — 36415 COLL VENOUS BLD VENIPUNCTURE: CPT

## 2025-02-14 ENCOUNTER — HOSPITAL ENCOUNTER (OUTPATIENT)
Dept: HOSPITAL 94 - RAD | Age: 60
Discharge: HOME | End: 2025-02-14
Attending: SPECIALIST/TECHNOLOGIST
Payer: MEDICAID

## 2025-02-14 DIAGNOSIS — M24.08: ICD-10-CM

## 2025-02-14 DIAGNOSIS — M25.475: ICD-10-CM

## 2025-02-14 DIAGNOSIS — M84.475A: Primary | ICD-10-CM

## 2025-02-14 DIAGNOSIS — M87.075: ICD-10-CM

## 2025-02-14 DIAGNOSIS — M87.875: ICD-10-CM

## 2025-02-14 PROCEDURE — 73700 CT LOWER EXTREMITY W/O DYE: CPT

## 2025-06-26 ENCOUNTER — HOSPITAL ENCOUNTER (OUTPATIENT)
Dept: HOSPITAL 94 - RAD | Age: 60
Discharge: HOME | End: 2025-06-26
Attending: PODIATRIST
Payer: MEDICAID

## 2025-06-26 DIAGNOSIS — M84.472A: Primary | ICD-10-CM

## 2025-06-26 DIAGNOSIS — M25.472: ICD-10-CM

## 2025-06-26 DIAGNOSIS — M87.073: ICD-10-CM

## 2025-06-26 PROCEDURE — 73700 CT LOWER EXTREMITY W/O DYE: CPT
